# Patient Record
Sex: FEMALE | Race: WHITE | URBAN - METROPOLITAN AREA
[De-identification: names, ages, dates, MRNs, and addresses within clinical notes are randomized per-mention and may not be internally consistent; named-entity substitution may affect disease eponyms.]

---

## 2017-07-07 ENCOUNTER — OFFICE VISIT - HEALTHEAST (OUTPATIENT)
Dept: INTERNAL MEDICINE | Facility: CLINIC | Age: 72
End: 2017-07-07

## 2017-07-07 DIAGNOSIS — M25.50 JOINT PAIN: ICD-10-CM

## 2017-07-07 ASSESSMENT — MIFFLIN-ST. JEOR: SCORE: 1233.44

## 2017-07-10 ENCOUNTER — COMMUNICATION - HEALTHEAST (OUTPATIENT)
Dept: SCHEDULING | Facility: CLINIC | Age: 72
End: 2017-07-10

## 2017-07-10 ENCOUNTER — COMMUNICATION - HEALTHEAST (OUTPATIENT)
Dept: INTERNAL MEDICINE | Facility: CLINIC | Age: 72
End: 2017-07-10

## 2017-07-10 ENCOUNTER — AMBULATORY - HEALTHEAST (OUTPATIENT)
Dept: INTERNAL MEDICINE | Facility: CLINIC | Age: 72
End: 2017-07-10

## 2017-07-10 DIAGNOSIS — M25.50 JOINT PAIN: ICD-10-CM

## 2017-07-11 LAB — ANA SER QL: 0.8 U

## 2017-08-08 ENCOUNTER — OFFICE VISIT - HEALTHEAST (OUTPATIENT)
Dept: RHEUMATOLOGY | Facility: CLINIC | Age: 72
End: 2017-08-08

## 2017-08-08 ENCOUNTER — RECORDS - HEALTHEAST (OUTPATIENT)
Dept: GENERAL RADIOLOGY | Age: 72
End: 2017-08-08

## 2017-08-08 DIAGNOSIS — M25.511 PAIN IN RIGHT SHOULDER: ICD-10-CM

## 2017-08-08 DIAGNOSIS — M54.50 CHRONIC BILATERAL LOW BACK PAIN WITHOUT SCIATICA: ICD-10-CM

## 2017-08-08 DIAGNOSIS — M25.512 PAIN IN LEFT SHOULDER: ICD-10-CM

## 2017-08-08 DIAGNOSIS — M54.50 LOW BACK PAIN: ICD-10-CM

## 2017-08-08 DIAGNOSIS — G89.29 CHRONIC PAIN OF BOTH SHOULDERS: ICD-10-CM

## 2017-08-08 DIAGNOSIS — G89.29 OTHER CHRONIC PAIN: ICD-10-CM

## 2017-08-08 DIAGNOSIS — M25.512 CHRONIC PAIN OF BOTH SHOULDERS: ICD-10-CM

## 2017-08-08 DIAGNOSIS — R70.0 ELEVATED SED RATE: ICD-10-CM

## 2017-08-08 DIAGNOSIS — M25.511 CHRONIC PAIN OF BOTH SHOULDERS: ICD-10-CM

## 2017-08-08 DIAGNOSIS — G89.29 CHRONIC BILATERAL LOW BACK PAIN WITHOUT SCIATICA: ICD-10-CM

## 2017-08-08 ASSESSMENT — MIFFLIN-ST. JEOR: SCORE: 1213.48

## 2017-08-09 LAB — HCV AB SERPL QL IA: NEGATIVE

## 2017-08-10 LAB
C-ANCA - HISTORICAL: NEGATIVE
P-ANCA - HISTORICAL: NEGATIVE

## 2017-09-05 ENCOUNTER — OFFICE VISIT - HEALTHEAST (OUTPATIENT)
Dept: RHEUMATOLOGY | Facility: CLINIC | Age: 72
End: 2017-09-05

## 2017-09-05 DIAGNOSIS — R70.0 ELEVATED SED RATE: ICD-10-CM

## 2017-09-05 DIAGNOSIS — M05.79 SEROPOSITIVE RHEUMATOID ARTHRITIS OF MULTIPLE SITES (H): ICD-10-CM

## 2017-09-05 DIAGNOSIS — Z79.899 HIGH RISK MEDICATION USE: ICD-10-CM

## 2017-09-05 LAB
ALT SERPL W P-5'-P-CCNC: 23 U/L (ref 0–45)
CREAT SERPL-MCNC: 3.1 MG/DL (ref 0.6–1.1)
GFR SERPL CREATININE-BSD FRML MDRD: 15 ML/MIN/1.73M2

## 2017-09-05 ASSESSMENT — MIFFLIN-ST. JEOR: SCORE: 1213.48

## 2017-10-02 ENCOUNTER — AMBULATORY - HEALTHEAST (OUTPATIENT)
Dept: LAB | Facility: CLINIC | Age: 72
End: 2017-10-02

## 2017-10-02 DIAGNOSIS — Z79.899 HIGH RISK MEDICATION USE: ICD-10-CM

## 2017-10-02 LAB
ALT SERPL W P-5'-P-CCNC: 18 U/L (ref 0–45)
CREAT SERPL-MCNC: 2.72 MG/DL (ref 0.6–1.1)
GFR SERPL CREATININE-BSD FRML MDRD: 17 ML/MIN/1.73M2

## 2017-10-03 ENCOUNTER — COMMUNICATION - HEALTHEAST (OUTPATIENT)
Dept: RHEUMATOLOGY | Facility: CLINIC | Age: 72
End: 2017-10-03

## 2017-10-03 DIAGNOSIS — M05.79 SEROPOSITIVE RHEUMATOID ARTHRITIS OF MULTIPLE SITES (H): ICD-10-CM

## 2017-11-09 ENCOUNTER — OFFICE VISIT - HEALTHEAST (OUTPATIENT)
Dept: RHEUMATOLOGY | Facility: CLINIC | Age: 72
End: 2017-11-09

## 2017-11-09 DIAGNOSIS — Z79.899 HIGH RISK MEDICATION USE: ICD-10-CM

## 2017-11-09 DIAGNOSIS — N18.30 CRF (CHRONIC RENAL FAILURE), STAGE 3 (MODERATE) (H): ICD-10-CM

## 2017-11-09 DIAGNOSIS — M05.79 SEROPOSITIVE RHEUMATOID ARTHRITIS OF MULTIPLE SITES (H): ICD-10-CM

## 2017-11-09 DIAGNOSIS — R70.0 ELEVATED SED RATE: ICD-10-CM

## 2017-11-09 LAB
ALT SERPL W P-5'-P-CCNC: 21 U/L (ref 0–45)
CREAT SERPL-MCNC: 3.07 MG/DL (ref 0.6–1.1)
GFR SERPL CREATININE-BSD FRML MDRD: 15 ML/MIN/1.73M2

## 2017-11-09 ASSESSMENT — MIFFLIN-ST. JEOR: SCORE: 1206.22

## 2017-12-03 ENCOUNTER — COMMUNICATION - HEALTHEAST (OUTPATIENT)
Dept: RHEUMATOLOGY | Facility: CLINIC | Age: 72
End: 2017-12-03

## 2017-12-03 DIAGNOSIS — M05.79 SEROPOSITIVE RHEUMATOID ARTHRITIS OF MULTIPLE SITES (H): ICD-10-CM

## 2017-12-28 ENCOUNTER — AMBULATORY - HEALTHEAST (OUTPATIENT)
Dept: LAB | Facility: CLINIC | Age: 72
End: 2017-12-28

## 2017-12-28 DIAGNOSIS — Z79.899 HIGH RISK MEDICATION USE: ICD-10-CM

## 2017-12-28 LAB
ALT SERPL W P-5'-P-CCNC: 11 U/L (ref 0–45)
CREAT SERPL-MCNC: 3.8 MG/DL (ref 0.6–1.1)
GFR SERPL CREATININE-BSD FRML MDRD: 12 ML/MIN/1.73M2

## 2018-01-09 ENCOUNTER — COMMUNICATION - HEALTHEAST (OUTPATIENT)
Dept: RHEUMATOLOGY | Facility: CLINIC | Age: 73
End: 2018-01-09

## 2018-01-09 DIAGNOSIS — M05.79 SEROPOSITIVE RHEUMATOID ARTHRITIS OF MULTIPLE SITES (H): ICD-10-CM

## 2018-01-23 ENCOUNTER — COMMUNICATION - HEALTHEAST (OUTPATIENT)
Dept: ADMINISTRATIVE | Facility: CLINIC | Age: 73
End: 2018-01-23

## 2018-01-23 ENCOUNTER — OFFICE VISIT - HEALTHEAST (OUTPATIENT)
Dept: RHEUMATOLOGY | Facility: CLINIC | Age: 73
End: 2018-01-23

## 2018-01-23 DIAGNOSIS — Z79.899 HIGH RISK MEDICATION USE: ICD-10-CM

## 2018-01-23 DIAGNOSIS — M25.562 ACUTE PAIN OF LEFT KNEE: ICD-10-CM

## 2018-01-23 DIAGNOSIS — M05.79 SEROPOSITIVE RHEUMATOID ARTHRITIS OF MULTIPLE SITES (H): ICD-10-CM

## 2018-01-23 DIAGNOSIS — N18.30 CRF (CHRONIC RENAL FAILURE), STAGE 3 (MODERATE) (H): ICD-10-CM

## 2018-01-23 ASSESSMENT — MIFFLIN-ST. JEOR: SCORE: 1183.54

## 2018-04-07 ENCOUNTER — COMMUNICATION - HEALTHEAST (OUTPATIENT)
Dept: RHEUMATOLOGY | Facility: CLINIC | Age: 73
End: 2018-04-07

## 2018-04-07 DIAGNOSIS — M05.79 SEROPOSITIVE RHEUMATOID ARTHRITIS OF MULTIPLE SITES (H): ICD-10-CM

## 2018-04-24 ENCOUNTER — AMBULATORY - HEALTHEAST (OUTPATIENT)
Dept: LAB | Facility: CLINIC | Age: 73
End: 2018-04-24

## 2018-04-24 DIAGNOSIS — Z79.899 HIGH RISK MEDICATION USE: ICD-10-CM

## 2018-04-24 LAB
ALBUMIN SERPL-MCNC: 3 G/DL (ref 3.5–5)
ALT SERPL W P-5'-P-CCNC: <9 U/L (ref 0–45)
CREAT SERPL-MCNC: 3.12 MG/DL (ref 0.6–1.1)
ERYTHROCYTE [DISTWIDTH] IN BLOOD BY AUTOMATED COUNT: 13.1 % (ref 11–14.5)
GFR SERPL CREATININE-BSD FRML MDRD: 15 ML/MIN/1.73M2
HCT VFR BLD AUTO: 30.9 % (ref 35–47)
HGB BLD-MCNC: 10.2 G/DL (ref 12–16)
MCH RBC QN AUTO: 28.6 PG (ref 27–34)
MCHC RBC AUTO-ENTMCNC: 32.9 G/DL (ref 32–36)
MCV RBC AUTO: 87 FL (ref 80–100)
PLATELET # BLD AUTO: 372 THOU/UL (ref 140–440)
PMV BLD AUTO: 8.2 FL (ref 7–10)
RBC # BLD AUTO: 3.55 MILL/UL (ref 3.8–5.4)
WBC: 10.3 THOU/UL (ref 4–11)

## 2018-04-26 ENCOUNTER — COMMUNICATION - HEALTHEAST (OUTPATIENT)
Dept: RHEUMATOLOGY | Facility: CLINIC | Age: 73
End: 2018-04-26

## 2018-04-26 DIAGNOSIS — M05.79 SEROPOSITIVE RHEUMATOID ARTHRITIS OF MULTIPLE SITES (H): ICD-10-CM

## 2018-05-01 ENCOUNTER — RECORDS - HEALTHEAST (OUTPATIENT)
Dept: ADMINISTRATIVE | Facility: OTHER | Age: 73
End: 2018-05-01

## 2018-05-02 ENCOUNTER — COMMUNICATION - HEALTHEAST (OUTPATIENT)
Dept: TELEHEALTH | Facility: CLINIC | Age: 73
End: 2018-05-02

## 2018-05-02 ENCOUNTER — HOSPITAL ENCOUNTER (OUTPATIENT)
Dept: ULTRASOUND IMAGING | Facility: CLINIC | Age: 73
Discharge: HOME | End: 2018-05-02
Attending: INTERNAL MEDICINE

## 2018-05-02 DIAGNOSIS — N18.4 CKD (CHRONIC KIDNEY DISEASE), STAGE IV (H): ICD-10-CM

## 2018-05-10 ENCOUNTER — OFFICE VISIT - HEALTHEAST (OUTPATIENT)
Dept: RHEUMATOLOGY | Facility: CLINIC | Age: 73
End: 2018-05-10

## 2018-05-10 DIAGNOSIS — G89.29 CHRONIC PAIN OF BOTH SHOULDERS: ICD-10-CM

## 2018-05-10 DIAGNOSIS — M25.512 CHRONIC PAIN OF BOTH SHOULDERS: ICD-10-CM

## 2018-05-10 DIAGNOSIS — M05.79 SEROPOSITIVE RHEUMATOID ARTHRITIS OF MULTIPLE SITES (H): ICD-10-CM

## 2018-05-10 DIAGNOSIS — M77.8 TENDONITIS OF BOTH SHOULDERS: ICD-10-CM

## 2018-05-10 DIAGNOSIS — M25.511 CHRONIC PAIN OF BOTH SHOULDERS: ICD-10-CM

## 2018-05-10 DIAGNOSIS — N18.30 CRF (CHRONIC RENAL FAILURE), STAGE 3 (MODERATE) (H): ICD-10-CM

## 2018-05-10 DIAGNOSIS — Z79.899 HIGH RISK MEDICATION USE: ICD-10-CM

## 2018-05-16 ENCOUNTER — RECORDS - HEALTHEAST (OUTPATIENT)
Dept: ADMINISTRATIVE | Facility: OTHER | Age: 73
End: 2018-05-16

## 2018-05-18 ENCOUNTER — OFFICE VISIT - HEALTHEAST (OUTPATIENT)
Dept: PHYSICAL THERAPY | Facility: REHABILITATION | Age: 73
End: 2018-05-18

## 2018-05-18 DIAGNOSIS — M25.511 CHRONIC PAIN OF BOTH SHOULDERS: ICD-10-CM

## 2018-05-18 DIAGNOSIS — M25.512 CHRONIC PAIN OF BOTH SHOULDERS: ICD-10-CM

## 2018-05-18 DIAGNOSIS — M77.8 TENDONITIS OF BOTH SHOULDERS: ICD-10-CM

## 2018-05-18 DIAGNOSIS — G89.29 CHRONIC PAIN OF BOTH SHOULDERS: ICD-10-CM

## 2018-05-18 DIAGNOSIS — M25.562 ACUTE PAIN OF LEFT KNEE: ICD-10-CM

## 2018-05-23 ENCOUNTER — OFFICE VISIT - HEALTHEAST (OUTPATIENT)
Dept: PHYSICAL THERAPY | Facility: REHABILITATION | Age: 73
End: 2018-05-23

## 2018-05-23 DIAGNOSIS — M25.562 ACUTE PAIN OF LEFT KNEE: ICD-10-CM

## 2018-05-23 DIAGNOSIS — M25.512 CHRONIC PAIN OF BOTH SHOULDERS: ICD-10-CM

## 2018-05-23 DIAGNOSIS — M25.511 CHRONIC PAIN OF BOTH SHOULDERS: ICD-10-CM

## 2018-05-23 DIAGNOSIS — M77.8 TENDONITIS OF BOTH SHOULDERS: ICD-10-CM

## 2018-05-23 DIAGNOSIS — G89.29 CHRONIC PAIN OF BOTH SHOULDERS: ICD-10-CM

## 2018-05-24 ENCOUNTER — OFFICE VISIT - HEALTHEAST (OUTPATIENT)
Dept: PHYSICAL THERAPY | Facility: REHABILITATION | Age: 73
End: 2018-05-24

## 2018-05-24 DIAGNOSIS — M25.511 CHRONIC PAIN OF BOTH SHOULDERS: ICD-10-CM

## 2018-05-24 DIAGNOSIS — G89.29 CHRONIC PAIN OF BOTH SHOULDERS: ICD-10-CM

## 2018-05-24 DIAGNOSIS — M25.512 CHRONIC PAIN OF BOTH SHOULDERS: ICD-10-CM

## 2018-05-24 DIAGNOSIS — M77.8 TENDONITIS OF BOTH SHOULDERS: ICD-10-CM

## 2018-05-24 DIAGNOSIS — M25.562 ACUTE PAIN OF LEFT KNEE: ICD-10-CM

## 2018-05-30 ENCOUNTER — OFFICE VISIT - HEALTHEAST (OUTPATIENT)
Dept: PHYSICAL THERAPY | Facility: REHABILITATION | Age: 73
End: 2018-05-30

## 2018-05-30 DIAGNOSIS — G89.29 CHRONIC PAIN OF BOTH SHOULDERS: ICD-10-CM

## 2018-05-30 DIAGNOSIS — M77.8 TENDONITIS OF BOTH SHOULDERS: ICD-10-CM

## 2018-05-30 DIAGNOSIS — M25.511 CHRONIC PAIN OF BOTH SHOULDERS: ICD-10-CM

## 2018-05-30 DIAGNOSIS — M25.562 ACUTE PAIN OF LEFT KNEE: ICD-10-CM

## 2018-05-30 DIAGNOSIS — M25.512 CHRONIC PAIN OF BOTH SHOULDERS: ICD-10-CM

## 2018-06-01 ENCOUNTER — RECORDS - HEALTHEAST (OUTPATIENT)
Dept: ADMINISTRATIVE | Facility: OTHER | Age: 73
End: 2018-06-01

## 2018-06-04 ENCOUNTER — INFUSION - HEALTHEAST (OUTPATIENT)
Dept: INFUSION THERAPY | Facility: HOSPITAL | Age: 73
End: 2018-06-04

## 2018-06-04 DIAGNOSIS — N18.9 ANEMIA OF CHRONIC RENAL FAILURE: ICD-10-CM

## 2018-06-04 DIAGNOSIS — D63.1 ANEMIA OF CHRONIC RENAL FAILURE: ICD-10-CM

## 2018-06-06 ENCOUNTER — OFFICE VISIT - HEALTHEAST (OUTPATIENT)
Dept: PHYSICAL THERAPY | Facility: REHABILITATION | Age: 73
End: 2018-06-06

## 2018-06-06 DIAGNOSIS — G89.29 CHRONIC PAIN OF BOTH SHOULDERS: ICD-10-CM

## 2018-06-06 DIAGNOSIS — M77.8 TENDONITIS OF BOTH SHOULDERS: ICD-10-CM

## 2018-06-06 DIAGNOSIS — M25.512 CHRONIC PAIN OF BOTH SHOULDERS: ICD-10-CM

## 2018-06-06 DIAGNOSIS — M25.511 CHRONIC PAIN OF BOTH SHOULDERS: ICD-10-CM

## 2018-06-06 DIAGNOSIS — M25.562 ACUTE PAIN OF LEFT KNEE: ICD-10-CM

## 2018-06-11 ENCOUNTER — INFUSION - HEALTHEAST (OUTPATIENT)
Dept: INFUSION THERAPY | Facility: HOSPITAL | Age: 73
End: 2018-06-11

## 2018-06-11 DIAGNOSIS — N18.9 ANEMIA OF CHRONIC RENAL FAILURE: ICD-10-CM

## 2018-06-11 DIAGNOSIS — D63.1 ANEMIA OF CHRONIC RENAL FAILURE: ICD-10-CM

## 2018-06-11 LAB
ERYTHROCYTE [DISTWIDTH] IN BLOOD BY AUTOMATED COUNT: 14.6 % (ref 11–14.5)
HCT VFR BLD AUTO: 31.3 % (ref 35–47)
HGB BLD-MCNC: 9.8 G/DL (ref 12–16)
MCH RBC QN AUTO: 29.5 PG (ref 27–34)
MCHC RBC AUTO-ENTMCNC: 31.3 G/DL (ref 32–36)
MCV RBC AUTO: 94 FL (ref 80–100)
PLATELET # BLD AUTO: 329 THOU/UL (ref 140–440)
PMV BLD AUTO: 10.4 FL (ref 8.5–12.5)
RBC # BLD AUTO: 3.32 MILL/UL (ref 3.8–5.4)
WBC: 12.2 THOU/UL (ref 4–11)

## 2018-06-13 ENCOUNTER — OFFICE VISIT - HEALTHEAST (OUTPATIENT)
Dept: PHYSICAL THERAPY | Facility: REHABILITATION | Age: 73
End: 2018-06-13

## 2018-06-13 DIAGNOSIS — M25.512 CHRONIC PAIN OF BOTH SHOULDERS: ICD-10-CM

## 2018-06-13 DIAGNOSIS — M25.511 CHRONIC PAIN OF BOTH SHOULDERS: ICD-10-CM

## 2018-06-13 DIAGNOSIS — M77.8 TENDONITIS OF BOTH SHOULDERS: ICD-10-CM

## 2018-06-13 DIAGNOSIS — M25.562 ACUTE PAIN OF LEFT KNEE: ICD-10-CM

## 2018-06-13 DIAGNOSIS — G89.29 CHRONIC PAIN OF BOTH SHOULDERS: ICD-10-CM

## 2018-06-24 ENCOUNTER — COMMUNICATION - HEALTHEAST (OUTPATIENT)
Dept: RHEUMATOLOGY | Facility: CLINIC | Age: 73
End: 2018-06-24

## 2018-06-24 DIAGNOSIS — M05.79 SEROPOSITIVE RHEUMATOID ARTHRITIS OF MULTIPLE SITES (H): ICD-10-CM

## 2018-07-03 ENCOUNTER — RECORDS - HEALTHEAST (OUTPATIENT)
Dept: ADMINISTRATIVE | Facility: OTHER | Age: 73
End: 2018-07-03

## 2018-07-03 ENCOUNTER — OFFICE VISIT - HEALTHEAST (OUTPATIENT)
Dept: RHEUMATOLOGY | Facility: CLINIC | Age: 73
End: 2018-07-03

## 2018-07-03 ENCOUNTER — COMMUNICATION - HEALTHEAST (OUTPATIENT)
Dept: ADMINISTRATIVE | Facility: CLINIC | Age: 73
End: 2018-07-03

## 2018-07-03 DIAGNOSIS — M05.79 SEROPOSITIVE RHEUMATOID ARTHRITIS OF MULTIPLE SITES (H): ICD-10-CM

## 2018-07-03 DIAGNOSIS — R70.0 ELEVATED SED RATE: ICD-10-CM

## 2018-07-03 DIAGNOSIS — N18.30 CRF (CHRONIC RENAL FAILURE), STAGE 3 (MODERATE) (H): ICD-10-CM

## 2018-07-03 DIAGNOSIS — R76.8 CYCLIC CITRULLINATED PEPTIDE (CCP) ANTIBODY POSITIVE: ICD-10-CM

## 2018-07-03 DIAGNOSIS — Z79.899 HIGH RISK MEDICATION USE: ICD-10-CM

## 2018-07-03 LAB
ALBUMIN SERPL-MCNC: 3.1 G/DL (ref 3.5–5)
ALT SERPL W P-5'-P-CCNC: <9 U/L (ref 0–45)
CREAT SERPL-MCNC: 3.49 MG/DL (ref 0.6–1.1)
ERYTHROCYTE [DISTWIDTH] IN BLOOD BY AUTOMATED COUNT: 13.9 % (ref 11–14.5)
GFR SERPL CREATININE-BSD FRML MDRD: 13 ML/MIN/1.73M2
HCT VFR BLD AUTO: 35 % (ref 35–47)
HGB BLD-MCNC: 11.2 G/DL (ref 12–16)
MCH RBC QN AUTO: 29.4 PG (ref 27–34)
MCHC RBC AUTO-ENTMCNC: 31.9 G/DL (ref 32–36)
MCV RBC AUTO: 92 FL (ref 80–100)
PLATELET # BLD AUTO: 309 THOU/UL (ref 140–440)
PMV BLD AUTO: 8.6 FL (ref 7–10)
RBC # BLD AUTO: 3.79 MILL/UL (ref 3.8–5.4)
WBC: 10.5 THOU/UL (ref 4–11)

## 2018-07-03 ASSESSMENT — MIFFLIN-ST. JEOR: SCORE: 1105.52

## 2018-07-06 LAB
ALBUMIN PERCENT: 52.5 % (ref 51–67)
ALBUMIN SERPL ELPH-MCNC: 3.5 G/DL (ref 3.2–4.7)
ALPHA 1 PERCENT: 4.1 % (ref 2–4)
ALPHA 2 PERCENT: 13.1 % (ref 5–13)
ALPHA1 GLOB SERPL ELPH-MCNC: 0.3 G/DL (ref 0.1–0.3)
ALPHA2 GLOB SERPL ELPH-MCNC: 0.9 G/DL (ref 0.4–0.9)
B-GLOBULIN SERPL ELPH-MCNC: 0.9 G/DL (ref 0.7–1.2)
BETA PERCENT: 13.3 % (ref 10–17)
GAMMA GLOB SERPL ELPH-MCNC: 1.1 G/DL (ref 0.6–1.4)
GAMMA GLOBULIN PERCENT: 17 % (ref 9–20)
PATH ICD:: ABNORMAL
PROT PATTERN SERPL ELPH-IMP: ABNORMAL
PROT SERPL-MCNC: 6.7 G/DL (ref 6–8)
REVIEWING PATHOLOGIST: ABNORMAL

## 2018-07-10 ENCOUNTER — COMMUNICATION - HEALTHEAST (OUTPATIENT)
Dept: RHEUMATOLOGY | Facility: CLINIC | Age: 73
End: 2018-07-10

## 2018-07-16 ENCOUNTER — OFFICE VISIT - HEALTHEAST (OUTPATIENT)
Dept: RHEUMATOLOGY | Facility: CLINIC | Age: 73
End: 2018-07-16

## 2018-07-16 DIAGNOSIS — Z79.899 HIGH RISK MEDICATION USE: ICD-10-CM

## 2018-07-16 DIAGNOSIS — N18.30 CRF (CHRONIC RENAL FAILURE), STAGE 3 (MODERATE) (H): ICD-10-CM

## 2018-07-16 DIAGNOSIS — R76.8 CYCLIC CITRULLINATED PEPTIDE (CCP) ANTIBODY POSITIVE: ICD-10-CM

## 2018-07-16 DIAGNOSIS — M05.79 SEROPOSITIVE RHEUMATOID ARTHRITIS OF MULTIPLE SITES (H): ICD-10-CM

## 2018-07-17 LAB — HBV SURFACE AG SERPL QL IA: NEGATIVE

## 2018-07-18 LAB
QTF INTERPRETATION: NORMAL
QTF MITOGEN - NIL: 0.84 IU/ML
QTF NIL: 0.12 IU/ML
QTF RESULT: NEGATIVE
QTF TB ANTIGEN - NIL: -0.05 IU/ML

## 2018-07-19 ENCOUNTER — COMMUNICATION - HEALTHEAST (OUTPATIENT)
Dept: RHEUMATOLOGY | Facility: CLINIC | Age: 73
End: 2018-07-19

## 2018-07-25 ENCOUNTER — COMMUNICATION - HEALTHEAST (OUTPATIENT)
Dept: RHEUMATOLOGY | Facility: CLINIC | Age: 73
End: 2018-07-25

## 2018-07-25 DIAGNOSIS — M05.79 SEROPOSITIVE RHEUMATOID ARTHRITIS OF MULTIPLE SITES (H): ICD-10-CM

## 2018-07-27 ENCOUNTER — COMMUNICATION - HEALTHEAST (OUTPATIENT)
Dept: RHEUMATOLOGY | Facility: CLINIC | Age: 73
End: 2018-07-27

## 2018-07-27 DIAGNOSIS — M05.79 SEROPOSITIVE RHEUMATOID ARTHRITIS OF MULTIPLE SITES (H): ICD-10-CM

## 2018-07-30 ENCOUNTER — AMBULATORY - HEALTHEAST (OUTPATIENT)
Dept: RHEUMATOLOGY | Facility: CLINIC | Age: 73
End: 2018-07-30

## 2018-07-30 DIAGNOSIS — M05.79 SEROPOSITIVE RHEUMATOID ARTHRITIS OF MULTIPLE SITES (H): ICD-10-CM

## 2018-09-06 ENCOUNTER — OFFICE VISIT - HEALTHEAST (OUTPATIENT)
Dept: RHEUMATOLOGY | Facility: CLINIC | Age: 73
End: 2018-09-06

## 2018-09-06 DIAGNOSIS — N18.4 ANEMIA OF CHRONIC RENAL FAILURE, STAGE 4 (SEVERE) (H): ICD-10-CM

## 2018-09-06 DIAGNOSIS — D63.1 ANEMIA OF CHRONIC RENAL FAILURE, STAGE 4 (SEVERE) (H): ICD-10-CM

## 2018-09-06 DIAGNOSIS — R76.8 CYCLIC CITRULLINATED PEPTIDE (CCP) ANTIBODY POSITIVE: ICD-10-CM

## 2018-09-06 DIAGNOSIS — N18.30 CRF (CHRONIC RENAL FAILURE), STAGE 3 (MODERATE) (H): ICD-10-CM

## 2018-09-06 DIAGNOSIS — M05.79 SEROPOSITIVE RHEUMATOID ARTHRITIS OF MULTIPLE SITES (H): ICD-10-CM

## 2018-09-21 ENCOUNTER — COMMUNICATION - HEALTHEAST (OUTPATIENT)
Dept: ADMINISTRATIVE | Facility: CLINIC | Age: 73
End: 2018-09-21

## 2018-10-16 ENCOUNTER — COMMUNICATION - HEALTHEAST (OUTPATIENT)
Dept: RHEUMATOLOGY | Facility: CLINIC | Age: 73
End: 2018-10-16

## 2018-12-04 ENCOUNTER — AMBULATORY - HEALTHEAST (OUTPATIENT)
Dept: LAB | Facility: CLINIC | Age: 73
End: 2018-12-04

## 2018-12-04 DIAGNOSIS — M05.79 SEROPOSITIVE RHEUMATOID ARTHRITIS OF MULTIPLE SITES (H): ICD-10-CM

## 2018-12-04 DIAGNOSIS — N18.30 CRF (CHRONIC RENAL FAILURE), STAGE 3 (MODERATE) (H): ICD-10-CM

## 2018-12-04 LAB
ALBUMIN SERPL-MCNC: 3 G/DL (ref 3.5–5)
ALT SERPL W P-5'-P-CCNC: 10 U/L (ref 0–45)
CREAT SERPL-MCNC: 4.16 MG/DL (ref 0.6–1.1)
ERYTHROCYTE [DISTWIDTH] IN BLOOD BY AUTOMATED COUNT: 11.1 % (ref 11–14.5)
GFR SERPL CREATININE-BSD FRML MDRD: 11 ML/MIN/1.73M2
HCT VFR BLD AUTO: 33.5 % (ref 35–47)
HGB BLD-MCNC: 11.4 G/DL (ref 12–16)
MCH RBC QN AUTO: 32 PG (ref 27–34)
MCHC RBC AUTO-ENTMCNC: 33.9 G/DL (ref 32–36)
MCV RBC AUTO: 94 FL (ref 80–100)
PLATELET # BLD AUTO: 302 THOU/UL (ref 140–440)
PMV BLD AUTO: 8.1 FL (ref 7–10)
RBC # BLD AUTO: 3.55 MILL/UL (ref 3.8–5.4)
WBC: 8.8 THOU/UL (ref 4–11)

## 2018-12-06 ENCOUNTER — OFFICE VISIT - HEALTHEAST (OUTPATIENT)
Dept: RHEUMATOLOGY | Facility: CLINIC | Age: 73
End: 2018-12-06

## 2018-12-06 DIAGNOSIS — N18.30 CRF (CHRONIC RENAL FAILURE), STAGE 3 (MODERATE) (H): ICD-10-CM

## 2018-12-06 DIAGNOSIS — M05.79 SEROPOSITIVE RHEUMATOID ARTHRITIS OF MULTIPLE SITES (H): ICD-10-CM

## 2018-12-06 DIAGNOSIS — M77.8 TENDONITIS OF BOTH SHOULDERS: ICD-10-CM

## 2018-12-06 DIAGNOSIS — R76.8 CYCLIC CITRULLINATED PEPTIDE (CCP) ANTIBODY POSITIVE: ICD-10-CM

## 2018-12-19 ENCOUNTER — COMMUNICATION - HEALTHEAST (OUTPATIENT)
Dept: ADMINISTRATIVE | Facility: CLINIC | Age: 73
End: 2018-12-19

## 2018-12-19 ENCOUNTER — COMMUNICATION - HEALTHEAST (OUTPATIENT)
Dept: RHEUMATOLOGY | Facility: CLINIC | Age: 73
End: 2018-12-19

## 2019-01-21 ENCOUNTER — COMMUNICATION - HEALTHEAST (OUTPATIENT)
Dept: RHEUMATOLOGY | Facility: CLINIC | Age: 74
End: 2019-01-21

## 2019-01-28 ENCOUNTER — COMMUNICATION - HEALTHEAST (OUTPATIENT)
Dept: RHEUMATOLOGY | Facility: CLINIC | Age: 74
End: 2019-01-28

## 2019-01-28 DIAGNOSIS — M05.79 SEROPOSITIVE RHEUMATOID ARTHRITIS OF MULTIPLE SITES (H): ICD-10-CM

## 2019-02-15 ASSESSMENT — MIFFLIN-ST. JEOR: SCORE: 1095.1

## 2019-02-17 ASSESSMENT — MIFFLIN-ST. JEOR: SCORE: 1059.26

## 2019-02-19 ASSESSMENT — MIFFLIN-ST. JEOR: SCORE: 1061.08

## 2019-02-20 ENCOUNTER — AMBULATORY - HEALTHEAST (OUTPATIENT)
Dept: VASCULAR SURGERY | Facility: CLINIC | Age: 74
End: 2019-02-20

## 2019-02-20 DIAGNOSIS — T82.858S STENOSIS OF OTHER VASCULAR PROSTHETIC DEVICES, IMPLANTS AND GRAFTS, SEQUELA: ICD-10-CM

## 2019-02-20 DIAGNOSIS — N18.30 CRF (CHRONIC RENAL FAILURE), STAGE 3 (MODERATE) (H): ICD-10-CM

## 2019-02-20 ASSESSMENT — MIFFLIN-ST. JEOR: SCORE: 1069.69

## 2019-02-21 ENCOUNTER — SURGERY - HEALTHEAST (OUTPATIENT)
Dept: SURGERY | Facility: CLINIC | Age: 74
End: 2019-02-21

## 2019-02-21 ENCOUNTER — ANESTHESIA - HEALTHEAST (OUTPATIENT)
Dept: SURGERY | Facility: CLINIC | Age: 74
End: 2019-02-21

## 2019-02-22 ASSESSMENT — MIFFLIN-ST. JEOR: SCORE: 1065.16

## 2019-02-25 ENCOUNTER — COMMUNICATION - HEALTHEAST (OUTPATIENT)
Dept: VASCULAR SURGERY | Facility: CLINIC | Age: 74
End: 2019-02-25

## 2019-02-27 ENCOUNTER — COMMUNICATION - HEALTHEAST (OUTPATIENT)
Dept: VASCULAR SURGERY | Facility: CLINIC | Age: 74
End: 2019-02-27

## 2019-03-01 ENCOUNTER — COMMUNICATION - HEALTHEAST (OUTPATIENT)
Dept: NEUROSURGERY | Facility: CLINIC | Age: 74
End: 2019-03-01

## 2019-03-01 DIAGNOSIS — S22.000A THORACIC COMPRESSION FRACTURE (H): ICD-10-CM

## 2019-03-12 ENCOUNTER — HOSPITAL ENCOUNTER (OUTPATIENT)
Dept: RADIOLOGY | Facility: CLINIC | Age: 74
Discharge: HOME | End: 2019-03-12
Attending: SURGERY

## 2019-03-12 ENCOUNTER — OFFICE VISIT - HEALTHEAST (OUTPATIENT)
Dept: NEUROSURGERY | Facility: CLINIC | Age: 74
End: 2019-03-12

## 2019-03-12 DIAGNOSIS — R19.7 DIARRHEA, UNSPECIFIED TYPE: ICD-10-CM

## 2019-03-12 DIAGNOSIS — H93.19 TINNITUS, UNSPECIFIED LATERALITY: ICD-10-CM

## 2019-03-12 DIAGNOSIS — S22.000D CLOSED COMPRESSION FRACTURE OF THORACIC VERTEBRA WITH ROUTINE HEALING, SUBSEQUENT ENCOUNTER: ICD-10-CM

## 2019-03-12 DIAGNOSIS — S22.000A THORACIC COMPRESSION FRACTURE (H): ICD-10-CM

## 2019-03-12 DIAGNOSIS — R42 DIZZINESS: ICD-10-CM

## 2019-03-12 DIAGNOSIS — R07.89 OTHER CHEST PAIN: ICD-10-CM

## 2019-03-12 DIAGNOSIS — R05.9 COUGHING: ICD-10-CM

## 2019-03-12 ASSESSMENT — MIFFLIN-ST. JEOR: SCORE: 1065.61

## 2019-03-19 ENCOUNTER — RECORDS - HEALTHEAST (OUTPATIENT)
Dept: VASCULAR ULTRASOUND | Facility: CLINIC | Age: 74
End: 2019-03-19

## 2019-03-19 DIAGNOSIS — N18.30 STAGE 3 CHRONIC KIDNEY DISEASE (H): ICD-10-CM

## 2019-03-19 DIAGNOSIS — T82.858S STENOSIS OF OTHER VASCULAR PROSTHETIC DEVICES, IMPLANTS AND GRAFTS, SEQUELA: ICD-10-CM

## 2019-03-20 ENCOUNTER — OFFICE VISIT - HEALTHEAST (OUTPATIENT)
Dept: VASCULAR SURGERY | Facility: CLINIC | Age: 74
End: 2019-03-20

## 2019-03-20 ENCOUNTER — AMBULATORY - HEALTHEAST (OUTPATIENT)
Dept: VASCULAR SURGERY | Facility: CLINIC | Age: 74
End: 2019-03-20

## 2019-03-20 DIAGNOSIS — T82.858S STENOSIS OF OTHER VASCULAR PROSTHETIC DEVICES, IMPLANTS AND GRAFTS, SEQUELA: ICD-10-CM

## 2019-03-28 ENCOUNTER — OFFICE VISIT - HEALTHEAST (OUTPATIENT)
Dept: INTERNAL MEDICINE | Facility: CLINIC | Age: 74
End: 2019-03-28

## 2019-03-28 DIAGNOSIS — N18.6 ESRD (END STAGE RENAL DISEASE) ON DIALYSIS (H): ICD-10-CM

## 2019-03-28 DIAGNOSIS — R19.7 DIARRHEA, UNSPECIFIED TYPE: ICD-10-CM

## 2019-03-28 DIAGNOSIS — Z99.2 ESRD (END STAGE RENAL DISEASE) ON DIALYSIS (H): ICD-10-CM

## 2019-03-28 DIAGNOSIS — Z01.818 PREOPERATIVE EXAMINATION: ICD-10-CM

## 2019-03-28 DIAGNOSIS — N25.81 SECONDARY RENAL HYPERPARATHYROIDISM (H): ICD-10-CM

## 2019-03-28 DIAGNOSIS — R76.8 CYCLIC CITRULLINATED PEPTIDE (CCP) ANTIBODY POSITIVE: ICD-10-CM

## 2019-03-28 DIAGNOSIS — M05.79 SEROPOSITIVE RHEUMATOID ARTHRITIS OF MULTIPLE SITES (H): ICD-10-CM

## 2019-03-28 DIAGNOSIS — Z12.31 VISIT FOR SCREENING MAMMOGRAM: ICD-10-CM

## 2019-03-28 DIAGNOSIS — N18.5 ANEMIA OF CHRONIC RENAL FAILURE, STAGE 5 (H): ICD-10-CM

## 2019-03-28 DIAGNOSIS — D63.1 ANEMIA OF CHRONIC RENAL FAILURE, STAGE 5 (H): ICD-10-CM

## 2019-03-28 DIAGNOSIS — Z12.11 SCREEN FOR COLON CANCER: ICD-10-CM

## 2019-03-28 LAB — TSH SERPL DL<=0.005 MIU/L-ACNC: 2.6 UIU/ML (ref 0.3–5)

## 2019-03-28 ASSESSMENT — MIFFLIN-ST. JEOR: SCORE: 1061.07

## 2019-03-29 ENCOUNTER — AMBULATORY - HEALTHEAST (OUTPATIENT)
Dept: LAB | Facility: CLINIC | Age: 74
End: 2019-03-29

## 2019-03-29 DIAGNOSIS — R19.7 DIARRHEA, UNSPECIFIED TYPE: ICD-10-CM

## 2019-03-29 LAB
C DIFF TOX B STL QL: NEGATIVE
RIBOTYPE 027/NAP1/BI: NORMAL

## 2019-03-30 LAB
SHIGA TOXIN 1: NEGATIVE
SHIGA TOXIN 2: NEGATIVE

## 2019-03-31 LAB — O+P STL MICRO: NORMAL

## 2019-04-01 LAB
BACTERIA SPEC CULT: NORMAL
TTG IGA SER-ACNC: 0.5 U/ML
TTG IGG SER-ACNC: 0.9 U/ML

## 2019-04-02 ENCOUNTER — ANESTHESIA - HEALTHEAST (OUTPATIENT)
Dept: SURGERY | Facility: CLINIC | Age: 74
End: 2019-04-02

## 2019-04-02 ENCOUNTER — SURGERY - HEALTHEAST (OUTPATIENT)
Dept: SURGERY | Facility: CLINIC | Age: 74
End: 2019-04-02

## 2019-04-02 ENCOUNTER — AMBULATORY - HEALTHEAST (OUTPATIENT)
Dept: VASCULAR SURGERY | Facility: CLINIC | Age: 74
End: 2019-04-02

## 2019-04-02 ASSESSMENT — MIFFLIN-ST. JEOR: SCORE: 1053.13

## 2019-04-03 ENCOUNTER — COMMUNICATION - HEALTHEAST (OUTPATIENT)
Dept: RHEUMATOLOGY | Facility: CLINIC | Age: 74
End: 2019-04-03

## 2019-04-03 ENCOUNTER — SURGERY - HEALTHEAST (OUTPATIENT)
Dept: VASCULAR SURGERY | Facility: CLINIC | Age: 74
End: 2019-04-03

## 2019-04-04 ENCOUNTER — ANESTHESIA - HEALTHEAST (OUTPATIENT)
Dept: SURGERY | Facility: CLINIC | Age: 74
End: 2019-04-04

## 2019-04-05 ENCOUNTER — SURGERY - HEALTHEAST (OUTPATIENT)
Dept: SURGERY | Facility: CLINIC | Age: 74
End: 2019-04-05

## 2019-04-05 ENCOUNTER — APPOINTMENT (OUTPATIENT)
Dept: SURGERY | Facility: PHYSICIAN GROUP | Age: 74
End: 2019-04-05

## 2019-04-05 ASSESSMENT — MIFFLIN-ST. JEOR: SCORE: 1039.24

## 2019-04-10 ENCOUNTER — HOSPITAL ENCOUNTER (OUTPATIENT)
Dept: MAMMOGRAPHY | Facility: CLINIC | Age: 74
Discharge: HOME | End: 2019-04-10
Attending: INTERNAL MEDICINE

## 2019-04-10 DIAGNOSIS — Z12.31 VISIT FOR SCREENING MAMMOGRAM: ICD-10-CM

## 2019-04-11 ENCOUNTER — OFFICE VISIT - HEALTHEAST (OUTPATIENT)
Dept: INTERNAL MEDICINE | Facility: CLINIC | Age: 74
End: 2019-04-11

## 2019-04-11 DIAGNOSIS — M05.79 SEROPOSITIVE RHEUMATOID ARTHRITIS OF MULTIPLE SITES (H): ICD-10-CM

## 2019-04-11 DIAGNOSIS — S22.000A COMPRESSION FRACTURE OF BODY OF THORACIC VERTEBRA (H): ICD-10-CM

## 2019-04-11 DIAGNOSIS — R19.7 DIARRHEA, UNSPECIFIED TYPE: ICD-10-CM

## 2019-04-11 DIAGNOSIS — N18.6 ESRD (END STAGE RENAL DISEASE) (H): ICD-10-CM

## 2019-04-11 ASSESSMENT — MIFFLIN-ST. JEOR: SCORE: 1065.61

## 2019-04-17 ENCOUNTER — OFFICE VISIT - HEALTHEAST (OUTPATIENT)
Dept: VASCULAR SURGERY | Facility: CLINIC | Age: 74
End: 2019-04-17

## 2019-04-17 ENCOUNTER — RECORDS - HEALTHEAST (OUTPATIENT)
Dept: VASCULAR ULTRASOUND | Facility: CLINIC | Age: 74
End: 2019-04-17

## 2019-04-17 DIAGNOSIS — T82.858S STENOSIS OF OTHER VASCULAR PROSTHETIC DEVICES, IMPLANTS AND GRAFTS, SEQUELA: ICD-10-CM

## 2019-04-19 ENCOUNTER — HOSPITAL ENCOUNTER (OUTPATIENT)
Dept: MAMMOGRAPHY | Facility: CLINIC | Age: 74
Discharge: HOME | End: 2019-04-19
Attending: INTERNAL MEDICINE

## 2019-04-19 DIAGNOSIS — N64.89 BREAST ASYMMETRY: ICD-10-CM

## 2019-04-26 ENCOUNTER — HOSPITAL ENCOUNTER (OUTPATIENT)
Dept: RADIOLOGY | Facility: CLINIC | Age: 74
Discharge: HOME | End: 2019-04-26

## 2019-04-26 ENCOUNTER — OFFICE VISIT - HEALTHEAST (OUTPATIENT)
Dept: NEUROSURGERY | Facility: CLINIC | Age: 74
End: 2019-04-26

## 2019-04-26 DIAGNOSIS — S22.000D CLOSED COMPRESSION FRACTURE OF THORACIC VERTEBRA WITH ROUTINE HEALING, SUBSEQUENT ENCOUNTER: ICD-10-CM

## 2019-05-03 ENCOUNTER — HOSPITAL ENCOUNTER (OUTPATIENT)
Dept: MAMMOGRAPHY | Facility: CLINIC | Age: 74
Discharge: HOME | End: 2019-05-03
Attending: INTERNAL MEDICINE

## 2019-05-03 ENCOUNTER — HOSPITAL ENCOUNTER (OUTPATIENT)
Dept: MAMMOGRAPHY | Facility: CLINIC | Age: 74
Discharge: HOME | End: 2019-05-03
Attending: INTERNAL MEDICINE | Admitting: RADIOLOGY

## 2019-05-03 DIAGNOSIS — N64.89 BREAST ASYMMETRY: ICD-10-CM

## 2019-05-07 ENCOUNTER — TRANSFERRED RECORDS (OUTPATIENT)
Dept: HEALTH INFORMATION MANAGEMENT | Facility: CLINIC | Age: 74
End: 2019-05-07

## 2019-05-07 ENCOUNTER — RECORDS - HEALTHEAST (OUTPATIENT)
Dept: ADMINISTRATIVE | Facility: OTHER | Age: 74
End: 2019-05-07

## 2019-05-08 ENCOUNTER — TRANSFERRED RECORDS (OUTPATIENT)
Dept: HEALTH INFORMATION MANAGEMENT | Facility: CLINIC | Age: 74
End: 2019-05-08

## 2019-05-10 ENCOUNTER — HOSPITAL ENCOUNTER (OUTPATIENT)
Dept: ULTRASOUND IMAGING | Facility: CLINIC | Age: 74
Discharge: HOME | End: 2019-05-10
Attending: INTERNAL MEDICINE

## 2019-05-10 ENCOUNTER — OFFICE VISIT - HEALTHEAST (OUTPATIENT)
Dept: INTERNAL MEDICINE | Facility: CLINIC | Age: 74
End: 2019-05-10

## 2019-05-10 DIAGNOSIS — K65.4 MESENTERIC PANNICULITIS (H): ICD-10-CM

## 2019-05-10 DIAGNOSIS — R19.7 DIARRHEA, UNSPECIFIED TYPE: ICD-10-CM

## 2019-05-10 DIAGNOSIS — R10.30 LOWER ABDOMINAL PAIN: ICD-10-CM

## 2019-05-10 DIAGNOSIS — S22.000A COMPRESSION FRACTURE OF BODY OF THORACIC VERTEBRA (H): ICD-10-CM

## 2019-05-10 DIAGNOSIS — M05.79 SEROPOSITIVE RHEUMATOID ARTHRITIS OF MULTIPLE SITES (H): ICD-10-CM

## 2019-05-10 DIAGNOSIS — Z23 IMMUNIZATION DUE: ICD-10-CM

## 2019-05-10 DIAGNOSIS — N18.6 ESRD (END STAGE RENAL DISEASE) (H): ICD-10-CM

## 2019-05-10 LAB
ALBUMIN UR-MCNC: ABNORMAL MG/DL
APPEARANCE UR: CLEAR
BACTERIA #/AREA URNS HPF: ABNORMAL HPF
BILIRUB UR QL STRIP: NEGATIVE
COLOR UR AUTO: YELLOW
GLUCOSE UR STRIP-MCNC: NEGATIVE MG/DL
HGB UR QL STRIP: ABNORMAL
KETONES UR STRIP-MCNC: NEGATIVE MG/DL
LEUKOCYTE ESTERASE UR QL STRIP: NEGATIVE
MUCOUS THREADS #/AREA URNS LPF: ABNORMAL LPF
NITRATE UR QL: NEGATIVE
PH UR STRIP: 7.5 [PH] (ref 5–8)
RBC #/AREA URNS AUTO: ABNORMAL HPF
SP GR UR STRIP: 1.01 (ref 1–1.03)
SQUAMOUS #/AREA URNS AUTO: ABNORMAL LPF
UROBILINOGEN UR STRIP-ACNC: ABNORMAL
WBC #/AREA URNS AUTO: ABNORMAL HPF

## 2019-05-10 ASSESSMENT — MIFFLIN-ST. JEOR: SCORE: 1056.54

## 2019-05-29 ENCOUNTER — RECORDS - HEALTHEAST (OUTPATIENT)
Dept: ADMINISTRATIVE | Facility: OTHER | Age: 74
End: 2019-05-29

## 2019-06-12 ENCOUNTER — OFFICE VISIT - HEALTHEAST (OUTPATIENT)
Dept: INTERNAL MEDICINE | Facility: CLINIC | Age: 74
End: 2019-06-12

## 2019-06-12 ENCOUNTER — COMMUNICATION - HEALTHEAST (OUTPATIENT)
Dept: INTERNAL MEDICINE | Facility: CLINIC | Age: 74
End: 2019-06-12

## 2019-06-12 DIAGNOSIS — N18.6 ESRD (END STAGE RENAL DISEASE) ON DIALYSIS (H): ICD-10-CM

## 2019-06-12 DIAGNOSIS — K59.1 FUNCTIONAL DIARRHEA: ICD-10-CM

## 2019-06-12 DIAGNOSIS — M05.79 SEROPOSITIVE RHEUMATOID ARTHRITIS OF MULTIPLE SITES (H): ICD-10-CM

## 2019-06-12 DIAGNOSIS — Z99.2 ESRD (END STAGE RENAL DISEASE) ON DIALYSIS (H): ICD-10-CM

## 2019-06-12 ASSESSMENT — MIFFLIN-ST. JEOR: SCORE: 1047.46

## 2019-06-18 ENCOUNTER — OFFICE VISIT - HEALTHEAST (OUTPATIENT)
Dept: RHEUMATOLOGY | Facility: CLINIC | Age: 74
End: 2019-06-18

## 2019-06-18 DIAGNOSIS — M05.79 SEROPOSITIVE RHEUMATOID ARTHRITIS OF MULTIPLE SITES (H): ICD-10-CM

## 2019-06-18 DIAGNOSIS — R76.8 CYCLIC CITRULLINATED PEPTIDE (CCP) ANTIBODY POSITIVE: ICD-10-CM

## 2019-06-18 DIAGNOSIS — Z79.899 HIGH RISK MEDICATION USE: ICD-10-CM

## 2019-06-25 ENCOUNTER — COMMUNICATION - HEALTHEAST (OUTPATIENT)
Dept: VASCULAR SURGERY | Facility: CLINIC | Age: 74
End: 2019-06-25

## 2019-07-03 ENCOUNTER — RECORDS - HEALTHEAST (OUTPATIENT)
Dept: VASCULAR ULTRASOUND | Facility: CLINIC | Age: 74
End: 2019-07-03

## 2019-07-03 ENCOUNTER — OFFICE VISIT - HEALTHEAST (OUTPATIENT)
Dept: VASCULAR SURGERY | Facility: CLINIC | Age: 74
End: 2019-07-03

## 2019-07-03 DIAGNOSIS — N18.6 ESRD (END STAGE RENAL DISEASE) (H): ICD-10-CM

## 2019-07-03 DIAGNOSIS — T82.858S STENOSIS OF OTHER VASCULAR PROSTHETIC DEVICES, IMPLANTS AND GRAFTS, SEQUELA: ICD-10-CM

## 2019-07-03 ASSESSMENT — MIFFLIN-ST. JEOR: SCORE: 1034.31

## 2019-07-08 ENCOUNTER — OFFICE VISIT - HEALTHEAST (OUTPATIENT)
Dept: INTERNAL MEDICINE | Facility: CLINIC | Age: 74
End: 2019-07-08

## 2019-07-08 ENCOUNTER — COMMUNICATION - HEALTHEAST (OUTPATIENT)
Dept: SCHEDULING | Facility: CLINIC | Age: 74
End: 2019-07-08

## 2019-07-08 DIAGNOSIS — M05.79 SEROPOSITIVE RHEUMATOID ARTHRITIS OF MULTIPLE SITES (H): ICD-10-CM

## 2019-07-08 DIAGNOSIS — N18.6 ESRD (END STAGE RENAL DISEASE) ON DIALYSIS (H): ICD-10-CM

## 2019-07-08 DIAGNOSIS — Z99.2 ESRD (END STAGE RENAL DISEASE) ON DIALYSIS (H): ICD-10-CM

## 2019-07-08 DIAGNOSIS — M80.00XS AGE-RELATED OSTEOPOROSIS WITH CURRENT PATHOLOGICAL FRACTURE, SEQUELA: ICD-10-CM

## 2019-07-08 DIAGNOSIS — S22.000A COMPRESSION FRACTURE OF BODY OF THORACIC VERTEBRA (H): ICD-10-CM

## 2019-07-08 LAB
ANION GAP SERPL CALCULATED.3IONS-SCNC: 17 MMOL/L (ref 5–18)
BUN SERPL-MCNC: 72 MG/DL (ref 8–28)
CALCIUM SERPL-MCNC: 9.4 MG/DL (ref 8.5–10.5)
CHLORIDE BLD-SCNC: 103 MMOL/L (ref 98–107)
CO2 SERPL-SCNC: 17 MMOL/L (ref 22–31)
CREAT SERPL-MCNC: 6.52 MG/DL (ref 0.6–1.1)
GFR SERPL CREATININE-BSD FRML MDRD: 6 ML/MIN/1.73M2
GLUCOSE BLD-MCNC: 70 MG/DL (ref 70–125)
PHOSPHATE SERPL-MCNC: 5.5 MG/DL (ref 2.5–4.5)
POTASSIUM BLD-SCNC: 5 MMOL/L (ref 3.5–5)
PTH-INTACT SERPL-MCNC: 374 PG/ML (ref 10–86)
SODIUM SERPL-SCNC: 137 MMOL/L (ref 136–145)
TSH SERPL DL<=0.005 MIU/L-ACNC: 3.56 UIU/ML (ref 0.3–5)

## 2019-07-08 ASSESSMENT — MIFFLIN-ST. JEOR: SCORE: 987.05

## 2019-07-09 LAB — 25(OH)D3 SERPL-MCNC: 30.7 NG/ML (ref 30–80)

## 2019-07-10 LAB
ALBUMIN PERCENT: 53.6 % (ref 51–67)
ALBUMIN SERPL ELPH-MCNC: 4.2 G/DL (ref 3.2–4.7)
ALPHA 1 PERCENT: 3 % (ref 2–4)
ALPHA 2 PERCENT: 10.2 % (ref 5–13)
ALPHA1 GLOB SERPL ELPH-MCNC: 0.2 G/DL (ref 0.1–0.3)
ALPHA2 GLOB SERPL ELPH-MCNC: 0.8 G/DL (ref 0.4–0.9)
B-GLOBULIN SERPL ELPH-MCNC: 0.9 G/DL (ref 0.7–1.2)
BETA PERCENT: 11.5 % (ref 10–17)
GAMMA GLOB SERPL ELPH-MCNC: 1.7 G/DL (ref 0.6–1.4)
GAMMA GLOBULIN PERCENT: 21.7 % (ref 9–20)
PATH ICD:: ABNORMAL
PROT PATTERN SERPL ELPH-IMP: ABNORMAL
PROT SERPL-MCNC: 7.8 G/DL (ref 6–8)
REVIEWING PATHOLOGIST: ABNORMAL

## 2019-07-11 LAB
TTG IGA SER-ACNC: 1.1 U/ML
TTG IGG SER-ACNC: 13 U/ML

## 2019-07-12 ENCOUNTER — INFUSION - HEALTHEAST (OUTPATIENT)
Dept: INFUSION THERAPY | Facility: CLINIC | Age: 74
End: 2019-07-12

## 2019-07-12 DIAGNOSIS — M05.79 SEROPOSITIVE RHEUMATOID ARTHRITIS OF MULTIPLE SITES (H): ICD-10-CM

## 2019-07-12 DIAGNOSIS — R76.8 CYCLIC CITRULLINATED PEPTIDE (CCP) ANTIBODY POSITIVE: ICD-10-CM

## 2019-07-15 ENCOUNTER — RECORDS - HEALTHEAST (OUTPATIENT)
Dept: BONE DENSITY | Facility: CLINIC | Age: 74
End: 2019-07-15

## 2019-07-15 ENCOUNTER — RECORDS - HEALTHEAST (OUTPATIENT)
Dept: ADMINISTRATIVE | Facility: OTHER | Age: 74
End: 2019-07-15

## 2019-07-15 DIAGNOSIS — M80.00XS AGE-RELATED OSTEOPOROSIS WITH CURRENT PATHOLOGICAL FRACTURE, UNSPECIFIED SITE, SEQUELA: ICD-10-CM

## 2019-07-15 LAB
GLIADIN IGA SER-ACNC: 5.8 U/ML
GLIADIN IGG SER-ACNC: 0.6 U/ML
IGA SERPL-MCNC: 327 MG/DL (ref 65–400)
TTG IGA SER-ACNC: 1.1 U/ML
TTG IGG SER-ACNC: 13 U/ML

## 2019-07-20 ENCOUNTER — AMBULATORY - HEALTHEAST (OUTPATIENT)
Dept: INTERNAL MEDICINE | Facility: CLINIC | Age: 74
End: 2019-07-20

## 2019-07-22 ENCOUNTER — COMMUNICATION - HEALTHEAST (OUTPATIENT)
Dept: INTERNAL MEDICINE | Facility: CLINIC | Age: 74
End: 2019-07-22

## 2019-07-26 ENCOUNTER — INFUSION - HEALTHEAST (OUTPATIENT)
Dept: INFUSION THERAPY | Facility: CLINIC | Age: 74
End: 2019-07-26

## 2019-07-26 DIAGNOSIS — M05.79 SEROPOSITIVE RHEUMATOID ARTHRITIS OF MULTIPLE SITES (H): ICD-10-CM

## 2019-07-26 DIAGNOSIS — R76.8 CYCLIC CITRULLINATED PEPTIDE (CCP) ANTIBODY POSITIVE: ICD-10-CM

## 2019-07-29 ENCOUNTER — COMMUNICATION - HEALTHEAST (OUTPATIENT)
Dept: RHEUMATOLOGY | Facility: CLINIC | Age: 74
End: 2019-07-29

## 2019-07-30 ENCOUNTER — OFFICE VISIT - HEALTHEAST (OUTPATIENT)
Dept: VASCULAR SURGERY | Facility: CLINIC | Age: 74
End: 2019-07-30

## 2019-07-30 ENCOUNTER — COMMUNICATION - HEALTHEAST (OUTPATIENT)
Dept: SCHEDULING | Facility: CLINIC | Age: 74
End: 2019-07-30

## 2019-07-30 ENCOUNTER — AMBULATORY - HEALTHEAST (OUTPATIENT)
Dept: LAB | Facility: CLINIC | Age: 74
End: 2019-07-30

## 2019-07-30 DIAGNOSIS — N18.6 ESRD (END STAGE RENAL DISEASE) (H): ICD-10-CM

## 2019-07-30 DIAGNOSIS — N18.30 CRF (CHRONIC RENAL FAILURE), STAGE 3 (MODERATE) (H): ICD-10-CM

## 2019-07-30 DIAGNOSIS — M05.79 SEROPOSITIVE RHEUMATOID ARTHRITIS OF MULTIPLE SITES (H): ICD-10-CM

## 2019-07-30 LAB
ALBUMIN SERPL-MCNC: 3.6 G/DL (ref 3.5–5)
ALT SERPL W P-5'-P-CCNC: 10 U/L (ref 0–45)
CREAT SERPL-MCNC: 9.14 MG/DL (ref 0.6–1.1)
ERYTHROCYTE [DISTWIDTH] IN BLOOD BY AUTOMATED COUNT: 13.7 % (ref 11–14.5)
GFR SERPL CREATININE-BSD FRML MDRD: 4 ML/MIN/1.73M2
HCT VFR BLD AUTO: 37.1 % (ref 35–47)
HGB BLD-MCNC: 12.2 G/DL (ref 12–16)
MCH RBC QN AUTO: 29.3 PG (ref 27–34)
MCHC RBC AUTO-ENTMCNC: 33 G/DL (ref 32–36)
MCV RBC AUTO: 89 FL (ref 80–100)
PLATELET # BLD AUTO: 261 THOU/UL (ref 140–440)
PMV BLD AUTO: 8.6 FL (ref 7–10)
RBC # BLD AUTO: 4.17 MILL/UL (ref 3.8–5.4)
WBC: 5.3 THOU/UL (ref 4–11)

## 2019-07-30 ASSESSMENT — MIFFLIN-ST. JEOR: SCORE: 985.24

## 2019-08-01 ENCOUNTER — OFFICE VISIT - HEALTHEAST (OUTPATIENT)
Dept: RHEUMATOLOGY | Facility: CLINIC | Age: 74
End: 2019-08-01

## 2019-08-01 DIAGNOSIS — N18.6 ESRD (END STAGE RENAL DISEASE) ON DIALYSIS (H): ICD-10-CM

## 2019-08-01 DIAGNOSIS — R76.8 CYCLIC CITRULLINATED PEPTIDE (CCP) ANTIBODY POSITIVE: ICD-10-CM

## 2019-08-01 DIAGNOSIS — M05.79 SEROPOSITIVE RHEUMATOID ARTHRITIS OF MULTIPLE SITES (H): ICD-10-CM

## 2019-08-01 DIAGNOSIS — Z99.2 ESRD (END STAGE RENAL DISEASE) ON DIALYSIS (H): ICD-10-CM

## 2019-08-02 ENCOUNTER — OFFICE VISIT - HEALTHEAST (OUTPATIENT)
Dept: INTERNAL MEDICINE | Facility: CLINIC | Age: 74
End: 2019-08-02

## 2019-08-02 DIAGNOSIS — Z99.2 ESRD (END STAGE RENAL DISEASE) ON DIALYSIS (H): ICD-10-CM

## 2019-08-02 DIAGNOSIS — M05.79 SEROPOSITIVE RHEUMATOID ARTHRITIS OF MULTIPLE SITES (H): ICD-10-CM

## 2019-08-02 DIAGNOSIS — K21.00 GASTROESOPHAGEAL REFLUX DISEASE WITH ESOPHAGITIS: ICD-10-CM

## 2019-08-02 DIAGNOSIS — R11.0 NAUSEA: ICD-10-CM

## 2019-08-02 DIAGNOSIS — N18.6 ESRD (END STAGE RENAL DISEASE) ON DIALYSIS (H): ICD-10-CM

## 2019-08-02 ASSESSMENT — MIFFLIN-ST. JEOR: SCORE: 1003.24

## 2019-08-14 ENCOUNTER — AMBULATORY - HEALTHEAST (OUTPATIENT)
Dept: LAB | Facility: CLINIC | Age: 74
End: 2019-08-14

## 2019-08-14 DIAGNOSIS — K21.00 GASTROESOPHAGEAL REFLUX DISEASE WITH ESOPHAGITIS: ICD-10-CM

## 2019-08-14 DIAGNOSIS — R11.0 NAUSEA: ICD-10-CM

## 2019-08-15 LAB
H PYLORI AG STL QL IA: NORMAL
REPORT STATUS: NORMAL
SPECIMEN DESCRIPTION: NORMAL

## 2019-08-20 ENCOUNTER — COMMUNICATION - HEALTHEAST (OUTPATIENT)
Dept: INTERNAL MEDICINE | Facility: CLINIC | Age: 74
End: 2019-08-20

## 2019-08-21 ENCOUNTER — AMBULATORY - HEALTHEAST (OUTPATIENT)
Dept: INTERNAL MEDICINE | Facility: CLINIC | Age: 74
End: 2019-08-21

## 2019-08-21 ENCOUNTER — COMMUNICATION - HEALTHEAST (OUTPATIENT)
Dept: INTERNAL MEDICINE | Facility: CLINIC | Age: 74
End: 2019-08-21

## 2019-08-21 DIAGNOSIS — R89.4 ABNORMAL CELIAC ANTIBODY PANEL: ICD-10-CM

## 2019-08-30 ENCOUNTER — COMMUNICATION - HEALTHEAST (OUTPATIENT)
Dept: VASCULAR SURGERY | Facility: CLINIC | Age: 74
End: 2019-08-30

## 2019-09-05 ENCOUNTER — OFFICE VISIT - HEALTHEAST (OUTPATIENT)
Dept: VASCULAR SURGERY | Facility: CLINIC | Age: 74
End: 2019-09-05

## 2019-09-05 ENCOUNTER — RECORDS - HEALTHEAST (OUTPATIENT)
Dept: ADMINISTRATIVE | Facility: OTHER | Age: 74
End: 2019-09-05

## 2019-09-05 DIAGNOSIS — N18.6 ESRD (END STAGE RENAL DISEASE) (H): ICD-10-CM

## 2019-09-05 DIAGNOSIS — T82.898A OTHER SPECIFIED COMPLICATION OF VASCULAR PROSTHETIC DEVICES, IMPLANTS AND GRAFTS, INITIAL ENCOUNTER (H): ICD-10-CM

## 2019-09-06 ENCOUNTER — AMBULATORY - HEALTHEAST (OUTPATIENT)
Dept: VASCULAR SURGERY | Facility: CLINIC | Age: 74
End: 2019-09-06

## 2019-09-06 ENCOUNTER — OFFICE VISIT - HEALTHEAST (OUTPATIENT)
Dept: INTERNAL MEDICINE | Facility: CLINIC | Age: 74
End: 2019-09-06

## 2019-09-06 DIAGNOSIS — N25.81 SECONDARY RENAL HYPERPARATHYROIDISM (H): ICD-10-CM

## 2019-09-06 DIAGNOSIS — N18.6 ESRD (END STAGE RENAL DISEASE) ON DIALYSIS (H): ICD-10-CM

## 2019-09-06 DIAGNOSIS — D63.1 ANEMIA OF CHRONIC RENAL FAILURE, STAGE 5 (H): ICD-10-CM

## 2019-09-06 DIAGNOSIS — Z01.818 PRE-OPERATIVE EXAMINATION: ICD-10-CM

## 2019-09-06 DIAGNOSIS — M05.79 SEROPOSITIVE RHEUMATOID ARTHRITIS OF MULTIPLE SITES (H): ICD-10-CM

## 2019-09-06 DIAGNOSIS — S22.000A COMPRESSION FRACTURE OF BODY OF THORACIC VERTEBRA (H): ICD-10-CM

## 2019-09-06 DIAGNOSIS — N18.5 ANEMIA OF CHRONIC RENAL FAILURE, STAGE 5 (H): ICD-10-CM

## 2019-09-06 DIAGNOSIS — Z99.2 ESRD (END STAGE RENAL DISEASE) ON DIALYSIS (H): ICD-10-CM

## 2019-09-06 LAB
ATRIAL RATE - MUSE: 78 BPM
DIASTOLIC BLOOD PRESSURE - MUSE: NORMAL MMHG
INTERPRETATION ECG - MUSE: NORMAL
P AXIS - MUSE: 52 DEGREES
PR INTERVAL - MUSE: 140 MS
QRS DURATION - MUSE: 86 MS
QT - MUSE: 400 MS
QTC - MUSE: 456 MS
R AXIS - MUSE: -1 DEGREES
SYSTOLIC BLOOD PRESSURE - MUSE: NORMAL MMHG
T AXIS - MUSE: 56 DEGREES
VENTRICULAR RATE- MUSE: 78 BPM

## 2019-09-06 ASSESSMENT — MIFFLIN-ST. JEOR: SCORE: 1003.24

## 2019-09-09 ENCOUNTER — SURGERY - HEALTHEAST (OUTPATIENT)
Dept: SURGERY | Facility: CLINIC | Age: 74
End: 2019-09-09

## 2019-09-09 ENCOUNTER — ANESTHESIA - HEALTHEAST (OUTPATIENT)
Dept: SURGERY | Facility: CLINIC | Age: 74
End: 2019-09-09

## 2019-09-09 ASSESSMENT — MIFFLIN-ST. JEOR
SCORE: 1062.43
SCORE: 995.31

## 2019-09-10 ASSESSMENT — MIFFLIN-ST. JEOR: SCORE: 1054.72

## 2019-09-11 ENCOUNTER — COMMUNICATION - HEALTHEAST (OUTPATIENT)
Dept: CARE COORDINATION | Facility: CLINIC | Age: 74
End: 2019-09-11

## 2019-09-11 ENCOUNTER — COMMUNICATION - HEALTHEAST (OUTPATIENT)
Dept: VASCULAR SURGERY | Facility: CLINIC | Age: 74
End: 2019-09-11

## 2019-09-17 ENCOUNTER — OFFICE VISIT - HEALTHEAST (OUTPATIENT)
Dept: INTERNAL MEDICINE | Facility: CLINIC | Age: 74
End: 2019-09-17

## 2019-09-17 DIAGNOSIS — N18.6 ESRD (END STAGE RENAL DISEASE) (H): ICD-10-CM

## 2019-09-17 DIAGNOSIS — Z99.2 HEMODIALYSIS PATIENT (H): ICD-10-CM

## 2019-09-17 DIAGNOSIS — I77.0 A-V FISTULA (H): ICD-10-CM

## 2019-09-17 DIAGNOSIS — K21.00 GASTROESOPHAGEAL REFLUX DISEASE WITH ESOPHAGITIS: ICD-10-CM

## 2019-09-17 ASSESSMENT — MIFFLIN-ST. JEOR: SCORE: 1061.07

## 2019-09-24 ENCOUNTER — RECORDS - HEALTHEAST (OUTPATIENT)
Dept: VASCULAR ULTRASOUND | Facility: CLINIC | Age: 74
End: 2019-09-24

## 2019-09-24 DIAGNOSIS — N18.6 END STAGE RENAL DISEASE (H): ICD-10-CM

## 2019-09-24 DIAGNOSIS — T82.898A OTHER SPECIFIED COMPLICATION OF VASCULAR PROSTHETIC DEVICES, IMPLANTS AND GRAFTS, INITIAL ENCOUNTER (H): ICD-10-CM

## 2019-09-26 ENCOUNTER — OFFICE VISIT - HEALTHEAST (OUTPATIENT)
Dept: VASCULAR SURGERY | Facility: CLINIC | Age: 74
End: 2019-09-26

## 2019-09-26 DIAGNOSIS — N18.6 ESRD (END STAGE RENAL DISEASE) (H): ICD-10-CM

## 2019-10-29 ENCOUNTER — AMBULATORY - HEALTHEAST (OUTPATIENT)
Dept: VASCULAR SURGERY | Facility: CLINIC | Age: 74
End: 2019-10-29

## 2019-10-29 DIAGNOSIS — T82.898A OTHER SPECIFIED COMPLICATION OF VASCULAR PROSTHETIC DEVICES, IMPLANTS AND GRAFTS, INITIAL ENCOUNTER (H): ICD-10-CM

## 2019-10-29 DIAGNOSIS — N18.6 ESRD (END STAGE RENAL DISEASE) (H): ICD-10-CM

## 2019-10-30 ENCOUNTER — RECORDS - HEALTHEAST (OUTPATIENT)
Dept: VASCULAR ULTRASOUND | Facility: CLINIC | Age: 74
End: 2019-10-30

## 2019-10-30 DIAGNOSIS — N18.6 END STAGE RENAL DISEASE (H): ICD-10-CM

## 2019-10-30 DIAGNOSIS — T82.898A OTHER SPECIFIED COMPLICATION OF VASCULAR PROSTHETIC DEVICES, IMPLANTS AND GRAFTS, INITIAL ENCOUNTER (H): ICD-10-CM

## 2019-10-31 ENCOUNTER — OFFICE VISIT - HEALTHEAST (OUTPATIENT)
Dept: VASCULAR SURGERY | Facility: CLINIC | Age: 74
End: 2019-10-31

## 2019-10-31 DIAGNOSIS — N18.6 ESRD (END STAGE RENAL DISEASE) (H): ICD-10-CM

## 2019-11-29 ENCOUNTER — AMBULATORY - HEALTHEAST (OUTPATIENT)
Dept: LAB | Facility: CLINIC | Age: 74
End: 2019-11-29

## 2019-11-29 ENCOUNTER — COMMUNICATION - HEALTHEAST (OUTPATIENT)
Dept: RHEUMATOLOGY | Facility: CLINIC | Age: 74
End: 2019-11-29

## 2019-11-29 DIAGNOSIS — N18.30 CRF (CHRONIC RENAL FAILURE), STAGE 3 (MODERATE) (H): ICD-10-CM

## 2019-11-29 DIAGNOSIS — M05.79 SEROPOSITIVE RHEUMATOID ARTHRITIS OF MULTIPLE SITES (H): ICD-10-CM

## 2019-11-29 LAB
ALBUMIN SERPL-MCNC: 3.2 G/DL (ref 3.5–5)
ALT SERPL W P-5'-P-CCNC: 14 U/L (ref 0–45)
CREAT SERPL-MCNC: 8.09 MG/DL (ref 0.6–1.1)
ERYTHROCYTE [DISTWIDTH] IN BLOOD BY AUTOMATED COUNT: 12.9 % (ref 11–14.5)
GFR SERPL CREATININE-BSD FRML MDRD: 5 ML/MIN/1.73M2
HCT VFR BLD AUTO: 37 % (ref 35–47)
HGB BLD-MCNC: 12.1 G/DL (ref 12–16)
MCH RBC QN AUTO: 31.1 PG (ref 27–34)
MCHC RBC AUTO-ENTMCNC: 32.7 G/DL (ref 32–36)
MCV RBC AUTO: 95 FL (ref 80–100)
PLATELET # BLD AUTO: 221 THOU/UL (ref 140–440)
PMV BLD AUTO: 8.8 FL (ref 7–10)
RBC # BLD AUTO: 3.89 MILL/UL (ref 3.8–5.4)
WBC: 7.6 THOU/UL (ref 4–11)

## 2019-12-03 ENCOUNTER — OFFICE VISIT - HEALTHEAST (OUTPATIENT)
Dept: RHEUMATOLOGY | Facility: CLINIC | Age: 74
End: 2019-12-03

## 2019-12-03 DIAGNOSIS — R76.8 CYCLIC CITRULLINATED PEPTIDE (CCP) ANTIBODY POSITIVE: ICD-10-CM

## 2019-12-03 DIAGNOSIS — M05.79 SEROPOSITIVE RHEUMATOID ARTHRITIS OF MULTIPLE SITES (H): ICD-10-CM

## 2019-12-03 DIAGNOSIS — Z99.2 ESRD (END STAGE RENAL DISEASE) ON DIALYSIS (H): ICD-10-CM

## 2019-12-03 DIAGNOSIS — Z99.2 HEMODIALYSIS PATIENT (H): ICD-10-CM

## 2019-12-03 DIAGNOSIS — N18.6 ESRD (END STAGE RENAL DISEASE) ON DIALYSIS (H): ICD-10-CM

## 2019-12-03 ASSESSMENT — MIFFLIN-ST. JEOR: SCORE: 1062.43

## 2019-12-17 ENCOUNTER — OFFICE VISIT - HEALTHEAST (OUTPATIENT)
Dept: INTERNAL MEDICINE | Facility: CLINIC | Age: 74
End: 2019-12-17

## 2019-12-17 DIAGNOSIS — M05.79 SEROPOSITIVE RHEUMATOID ARTHRITIS OF MULTIPLE SITES (H): ICD-10-CM

## 2019-12-17 DIAGNOSIS — Z99.2 ESRD (END STAGE RENAL DISEASE) ON DIALYSIS (H): ICD-10-CM

## 2019-12-17 DIAGNOSIS — N18.6 ESRD (END STAGE RENAL DISEASE) ON DIALYSIS (H): ICD-10-CM

## 2019-12-17 DIAGNOSIS — K21.00 GASTROESOPHAGEAL REFLUX DISEASE WITH ESOPHAGITIS: ICD-10-CM

## 2019-12-17 DIAGNOSIS — R76.8 POSITIVE AUTOANTIBODY SCREENING FOR CELIAC DISEASE: ICD-10-CM

## 2019-12-17 DIAGNOSIS — R19.7 DIARRHEA, UNSPECIFIED TYPE: ICD-10-CM

## 2019-12-17 ASSESSMENT — MIFFLIN-ST. JEOR: SCORE: 1070.14

## 2020-01-01 ENCOUNTER — COMMUNICATION - HEALTHEAST (OUTPATIENT)
Dept: FAMILY MEDICINE | Facility: CLINIC | Age: 75
End: 2020-01-01

## 2020-01-01 ENCOUNTER — COMMUNICATION - HEALTHEAST (OUTPATIENT)
Dept: NURSING | Facility: CLINIC | Age: 75
End: 2020-01-01

## 2020-01-01 ENCOUNTER — COMMUNICATION - HEALTHEAST (OUTPATIENT)
Dept: INTERNAL MEDICINE | Facility: CLINIC | Age: 75
End: 2020-01-01

## 2020-01-01 ENCOUNTER — COMMUNICATION - HEALTHEAST (OUTPATIENT)
Dept: RHEUMATOLOGY | Facility: CLINIC | Age: 75
End: 2020-01-01

## 2020-01-01 ENCOUNTER — AMBULATORY - HEALTHEAST (OUTPATIENT)
Dept: CARE COORDINATION | Facility: CLINIC | Age: 75
End: 2020-01-01

## 2020-01-01 ENCOUNTER — OFFICE VISIT - HEALTHEAST (OUTPATIENT)
Dept: RHEUMATOLOGY | Facility: CLINIC | Age: 75
End: 2020-01-01

## 2020-01-01 ENCOUNTER — AMBULATORY - HEALTHEAST (OUTPATIENT)
Dept: LAB | Facility: CLINIC | Age: 75
End: 2020-01-01

## 2020-01-01 ENCOUNTER — COMMUNICATION - HEALTHEAST (OUTPATIENT)
Dept: LAB | Facility: CLINIC | Age: 75
End: 2020-01-01

## 2020-01-01 ENCOUNTER — APPOINTMENT (OUTPATIENT)
Dept: INTERPRETER SERVICES | Facility: CLINIC | Age: 75
End: 2020-01-01
Payer: COMMERCIAL

## 2020-01-01 DIAGNOSIS — Z79.899 HIGH RISK MEDICATION USE: ICD-10-CM

## 2020-01-01 DIAGNOSIS — M05.79 SEROPOSITIVE RHEUMATOID ARTHRITIS OF MULTIPLE SITES (H): ICD-10-CM

## 2020-01-01 DIAGNOSIS — Z99.2 ESRD (END STAGE RENAL DISEASE) ON DIALYSIS (H): ICD-10-CM

## 2020-01-01 DIAGNOSIS — R76.8 CYCLIC CITRULLINATED PEPTIDE (CCP) ANTIBODY POSITIVE: ICD-10-CM

## 2020-01-01 DIAGNOSIS — N18.6 ESRD (END STAGE RENAL DISEASE) ON DIALYSIS (H): ICD-10-CM

## 2020-01-01 LAB
ALBUMIN SERPL-MCNC: 3.2 G/DL (ref 3.5–5)
ALT SERPL W P-5'-P-CCNC: 10 U/L (ref 0–45)
CREAT SERPL-MCNC: 4.85 MG/DL (ref 0.6–1.1)
ERYTHROCYTE [DISTWIDTH] IN BLOOD BY AUTOMATED COUNT: 12.5 % (ref 11–14.5)
GFR SERPL CREATININE-BSD FRML MDRD: 9 ML/MIN/1.73M2
HCT VFR BLD AUTO: 33.5 % (ref 35–47)
HGB BLD-MCNC: 11.1 G/DL (ref 12–16)
MCH RBC QN AUTO: 32.1 PG (ref 27–34)
MCHC RBC AUTO-ENTMCNC: 33.2 G/DL (ref 32–36)
MCV RBC AUTO: 97 FL (ref 80–100)
PLATELET # BLD AUTO: 340 THOU/UL (ref 140–440)
PMV BLD AUTO: 8.1 FL (ref 7–10)
RBC # BLD AUTO: 3.47 MILL/UL (ref 3.8–5.4)
WBC: 10.2 THOU/UL (ref 4–11)

## 2020-02-04 ENCOUNTER — OFFICE VISIT - HEALTHEAST (OUTPATIENT)
Dept: RHEUMATOLOGY | Facility: CLINIC | Age: 75
End: 2020-02-04

## 2020-02-04 DIAGNOSIS — R76.8 CYCLIC CITRULLINATED PEPTIDE (CCP) ANTIBODY POSITIVE: ICD-10-CM

## 2020-02-04 DIAGNOSIS — M05.79 SEROPOSITIVE RHEUMATOID ARTHRITIS OF MULTIPLE SITES (H): ICD-10-CM

## 2020-02-04 DIAGNOSIS — N18.6 ESRD (END STAGE RENAL DISEASE) ON DIALYSIS (H): ICD-10-CM

## 2020-02-04 DIAGNOSIS — Z79.899 HIGH RISK MEDICATION USE: ICD-10-CM

## 2020-02-04 DIAGNOSIS — S22.000A COMPRESSION FRACTURE OF BODY OF THORACIC VERTEBRA (H): ICD-10-CM

## 2020-02-04 DIAGNOSIS — Z99.2 ESRD (END STAGE RENAL DISEASE) ON DIALYSIS (H): ICD-10-CM

## 2020-02-04 LAB
IGA SERPL-MCNC: 1226 MG/DL
IGA SERPL-MCNC: 240 MG/DL (ref 65–400)
IGM SERPL-MCNC: 66 MG/DL (ref 60–280)

## 2020-02-04 ASSESSMENT — MIFFLIN-ST. JEOR: SCORE: 1079.21

## 2020-02-27 ENCOUNTER — OFFICE VISIT - HEALTHEAST (OUTPATIENT)
Dept: CARDIOLOGY | Facility: CLINIC | Age: 75
End: 2020-02-27

## 2020-02-27 DIAGNOSIS — R01.1 HEART MURMUR: ICD-10-CM

## 2020-02-27 DIAGNOSIS — R00.2 PALPITATIONS: ICD-10-CM

## 2020-02-27 ASSESSMENT — MIFFLIN-ST. JEOR: SCORE: 1071.05

## 2020-03-05 ENCOUNTER — OFFICE VISIT - HEALTHEAST (OUTPATIENT)
Dept: INTERNAL MEDICINE | Facility: CLINIC | Age: 75
End: 2020-03-05

## 2020-03-05 ENCOUNTER — RECORDS - HEALTHEAST (OUTPATIENT)
Dept: GENERAL RADIOLOGY | Facility: CLINIC | Age: 75
End: 2020-03-05

## 2020-03-05 DIAGNOSIS — M25.551 HIP PAIN, RIGHT: ICD-10-CM

## 2020-03-05 DIAGNOSIS — M05.79 SEROPOSITIVE RHEUMATOID ARTHRITIS OF MULTIPLE SITES (H): ICD-10-CM

## 2020-03-05 DIAGNOSIS — Z96.642 H/O TOTAL HIP ARTHROPLASTY, LEFT: ICD-10-CM

## 2020-03-05 DIAGNOSIS — Z99.2 ESRD (END STAGE RENAL DISEASE) ON DIALYSIS (H): ICD-10-CM

## 2020-03-05 DIAGNOSIS — M25.551 PAIN IN RIGHT HIP: ICD-10-CM

## 2020-03-05 DIAGNOSIS — N18.6 ESRD (END STAGE RENAL DISEASE) ON DIALYSIS (H): ICD-10-CM

## 2020-03-05 DIAGNOSIS — Z96.642 PRESENCE OF LEFT ARTIFICIAL HIP JOINT: ICD-10-CM

## 2020-03-05 ASSESSMENT — MIFFLIN-ST. JEOR: SCORE: 1088.29

## 2020-03-10 ENCOUNTER — HOSPITAL ENCOUNTER (OUTPATIENT)
Dept: CARDIOLOGY | Facility: CLINIC | Age: 75
Discharge: HOME | End: 2020-03-10
Attending: INTERNAL MEDICINE

## 2020-03-10 DIAGNOSIS — R01.1 HEART MURMUR: ICD-10-CM

## 2020-03-10 LAB
AORTIC ROOT: 2.7 CM
AORTIC VALVE MEAN VELOCITY: 134 CM/S
AR DECEL SLOPE: 3540 MM/S2
AR PEAK VELOCITY: 347 CM/S
ASCENDING AORTA: 3.3 CM
AV DIMENSIONLESS INDEX VTI: 0.8
AV MEAN GRADIENT: 9 MMHG
AV PEAK GRADIENT: 18 MMHG
AV REGURGITANT PEAK GRADIENT: 48.2 MMHG
AV REGURGITATION PRESSURE HALF TIME: 288 MS
AV VALVE AREA: 2 CM2
AV VELOCITY RATIO: 0.7
BSA FOR ECHO PROCEDURE: 1.67 M2
CV BLOOD PRESSURE: ABNORMAL MMHG
CV ECHO HEIGHT: 62 IN
CV ECHO WEIGHT: 140 LBS
DOP CALC AO PEAK VEL: 212 CM/S
DOP CALC AO VTI: 45.6 CM
DOP CALC LVOT AREA: 2.54 CM2
DOP CALC LVOT DIAMETER: 1.8 CM
DOP CALC LVOT PEAK VEL: 154 CM/S
DOP CALC LVOT STROKE VOLUME: 89.5 CM3
DOP CALC MV VTI: 48.4 CM
DOP CALCLVOT PEAK VEL VTI: 35.2 CM
EJECTION FRACTION: 71 % (ref 55–75)
FRACTIONAL SHORTENING: 44.2 % (ref 28–44)
INTERVENTRICULAR SEPTUM IN END DIASTOLE: 0.7 CM (ref 0.6–0.9)
IVS/PW RATIO: 0.6
LA AREA 1: 26.3 CM2
LA AREA 2: 24.9 CM2
LEFT ATRIUM LENGTH: 6.24 CM
LEFT ATRIUM SIZE: 4.2 CM
LEFT ATRIUM TO AORTIC ROOT RATIO: 1.56 NO UNITS
LEFT ATRIUM VOLUME INDEX: 53.4 ML/M2
LEFT ATRIUM VOLUME: 89.2 ML
LEFT VENTRICLE CARDIAC INDEX: 3 L/MIN/M2
LEFT VENTRICLE CARDIAC OUTPUT: 5 L/MIN
LEFT VENTRICLE DIASTOLIC VOLUME INDEX: 57.5 CM3/M2 (ref 29–61)
LEFT VENTRICLE DIASTOLIC VOLUME: 96 CM3 (ref 46–106)
LEFT VENTRICLE HEART RATE: 56 BPM
LEFT VENTRICLE MASS INDEX: 101.2 G/M2
LEFT VENTRICLE SYSTOLIC VOLUME INDEX: 16.8 CM3/M2 (ref 8–24)
LEFT VENTRICLE SYSTOLIC VOLUME: 28 CM3 (ref 14–42)
LEFT VENTRICULAR INTERNAL DIMENSION IN DIASTOLE: 5.2 CM (ref 3.8–5.2)
LEFT VENTRICULAR INTERNAL DIMENSION IN SYSTOLE: 2.9 CM (ref 2.2–3.5)
LEFT VENTRICULAR MASS: 169 G
LEFT VENTRICULAR OUTFLOW TRACT MEAN GRADIENT: 5 MMHG
LEFT VENTRICULAR OUTFLOW TRACT MEAN VELOCITY: 103 CM/S
LEFT VENTRICULAR OUTFLOW TRACT PEAK GRADIENT: 9 MMHG
LEFT VENTRICULAR POSTERIOR WALL IN END DIASTOLE: 1.1 CM (ref 0.6–0.9)
LV STROKE VOLUME INDEX: 53.6 ML/M2
MITRAL VALVE E/A RATIO: 1
MITRAL VALVE MEAN INFLOW VELOCITY: 93 CM/S
MITRAL VALVE PEAK VELOCITY: 145 CM/S
MV AREA VTI: 1.85 CM2
MV AVERAGE E/E' RATIO: 18.2 CM/S
MV DECELERATION TIME: 218 MS
MV E'TISSUE VEL-LAT: 7.51 CM/S
MV E'TISSUE VEL-MED: 6.43 CM/S
MV LATERAL E/E' RATIO: 16.9
MV MEAN GRADIENT: 4 MMHG
MV MEDIAL E/E' RATIO: 19.8
MV PEAK A VELOCITY: 126 CM/S
MV PEAK E VELOCITY: 127 CM/S
MV PEAK GRADIENT: 8.4 MMHG
MV VALVE AREA BY CONTINUITY EQUATION: 1.8 CM2
NUC REST DIASTOLIC VOLUME INDEX: 2240 LBS
NUC REST SYSTOLIC VOLUME INDEX: 62 IN
TRICUSPID REGURGITATION PEAK PRESSURE GRADIENT: 32.3 MMHG
TRICUSPID VALVE ANULAR PLANE SYSTOLIC EXCURSION: 1.9 CM
TRICUSPID VALVE PEAK REGURGITANT VELOCITY: 284 CM/S

## 2020-03-10 ASSESSMENT — MIFFLIN-ST. JEOR: SCORE: 1088.29

## 2020-03-12 ENCOUNTER — OFFICE VISIT - HEALTHEAST (OUTPATIENT)
Dept: PHYSICAL THERAPY | Facility: REHABILITATION | Age: 75
End: 2020-03-12

## 2020-03-12 DIAGNOSIS — R26.81 UNSTEADINESS ON FEET: ICD-10-CM

## 2020-03-12 DIAGNOSIS — M25.551 ACUTE RIGHT HIP PAIN: ICD-10-CM

## 2020-03-12 DIAGNOSIS — M62.81 GENERALIZED MUSCLE WEAKNESS: ICD-10-CM

## 2020-03-12 DIAGNOSIS — M25.651 HIP STIFFNESS, RIGHT: ICD-10-CM

## 2020-03-13 ENCOUNTER — AMBULATORY - HEALTHEAST (OUTPATIENT)
Dept: INTERNAL MEDICINE | Facility: CLINIC | Age: 75
End: 2020-03-13

## 2020-03-13 DIAGNOSIS — M79.642 PAIN OF LEFT HAND: ICD-10-CM

## 2020-03-13 DIAGNOSIS — M25.529 ELBOW PAIN: ICD-10-CM

## 2020-03-19 ENCOUNTER — AMBULATORY - HEALTHEAST (OUTPATIENT)
Dept: CARDIOLOGY | Facility: CLINIC | Age: 75
End: 2020-03-19

## 2020-04-08 ENCOUNTER — COMMUNICATION - HEALTHEAST (OUTPATIENT)
Dept: CARDIOLOGY | Facility: CLINIC | Age: 75
End: 2020-04-08

## 2020-04-09 ENCOUNTER — COMMUNICATION - HEALTHEAST (OUTPATIENT)
Dept: PHYSICAL THERAPY | Facility: REHABILITATION | Age: 75
End: 2020-04-09

## 2020-04-10 ENCOUNTER — COMMUNICATION - HEALTHEAST (OUTPATIENT)
Dept: ADMINISTRATIVE | Facility: CLINIC | Age: 75
End: 2020-04-10

## 2020-04-23 ENCOUNTER — OFFICE VISIT - HEALTHEAST (OUTPATIENT)
Dept: RHEUMATOLOGY | Facility: CLINIC | Age: 75
End: 2020-04-23

## 2020-04-23 DIAGNOSIS — Z99.2 ESRD (END STAGE RENAL DISEASE) ON DIALYSIS (H): ICD-10-CM

## 2020-04-23 DIAGNOSIS — N18.6 ESRD (END STAGE RENAL DISEASE) ON DIALYSIS (H): ICD-10-CM

## 2020-04-23 DIAGNOSIS — M05.79 SEROPOSITIVE RHEUMATOID ARTHRITIS OF MULTIPLE SITES (H): ICD-10-CM

## 2020-04-23 DIAGNOSIS — R76.8 CYCLIC CITRULLINATED PEPTIDE (CCP) ANTIBODY POSITIVE: ICD-10-CM

## 2021-01-01 ENCOUNTER — OFFICE VISIT - HEALTHEAST (OUTPATIENT)
Dept: INTERNAL MEDICINE | Facility: CLINIC | Age: 76
End: 2021-01-01

## 2021-01-01 ENCOUNTER — RECORDS - HEALTHEAST (OUTPATIENT)
Dept: ADMINISTRATIVE | Facility: OTHER | Age: 76
End: 2021-01-01

## 2021-01-01 ENCOUNTER — AMBULATORY - HEALTHEAST (OUTPATIENT)
Dept: RHEUMATOLOGY | Facility: CLINIC | Age: 76
End: 2021-01-01

## 2021-01-01 ENCOUNTER — SURGERY - HEALTHEAST (OUTPATIENT)
Dept: SURGERY | Facility: CLINIC | Age: 76
End: 2021-01-01

## 2021-01-01 ENCOUNTER — TRANSFERRED RECORDS (OUTPATIENT)
Dept: HEALTH INFORMATION MANAGEMENT | Facility: CLINIC | Age: 76
End: 2021-01-01

## 2021-01-01 ENCOUNTER — AMBULATORY - HEALTHEAST (OUTPATIENT)
Dept: LAB | Facility: CLINIC | Age: 76
End: 2021-01-01

## 2021-01-01 ENCOUNTER — REFERRAL (OUTPATIENT)
Dept: TRANSPLANT | Facility: CLINIC | Age: 76
End: 2021-01-01

## 2021-01-01 ENCOUNTER — HEALTH MAINTENANCE LETTER (OUTPATIENT)
Age: 76
End: 2021-01-01

## 2021-01-01 ENCOUNTER — COMMUNICATION - HEALTHEAST (OUTPATIENT)
Dept: RHEUMATOLOGY | Facility: CLINIC | Age: 76
End: 2021-01-01

## 2021-01-01 ENCOUNTER — APPOINTMENT (OUTPATIENT)
Dept: INTERPRETER SERVICES | Facility: CLINIC | Age: 76
End: 2021-01-01
Payer: COMMERCIAL

## 2021-01-01 ENCOUNTER — MEDICAL CORRESPONDENCE (OUTPATIENT)
Dept: HEALTH INFORMATION MANAGEMENT | Facility: CLINIC | Age: 76
End: 2021-01-01

## 2021-01-01 ENCOUNTER — COMMUNICATION - HEALTHEAST (OUTPATIENT)
Dept: SCHEDULING | Facility: CLINIC | Age: 76
End: 2021-01-01

## 2021-01-01 ENCOUNTER — AMBULATORY - HEALTHEAST (OUTPATIENT)
Dept: CARE COORDINATION | Facility: CLINIC | Age: 76
End: 2021-01-01

## 2021-01-01 ENCOUNTER — AMBULATORY - HEALTHEAST (OUTPATIENT)
Dept: PHARMACY | Facility: HOSPITAL | Age: 76
End: 2021-01-01

## 2021-01-01 ENCOUNTER — HOSPITAL ENCOUNTER (OUTPATIENT)
Dept: NUCLEAR MEDICINE | Facility: CLINIC | Age: 76
Discharge: HOME | End: 2021-03-29
Attending: INTERNAL MEDICINE

## 2021-01-01 ENCOUNTER — COMMUNICATION - HEALTHEAST (OUTPATIENT)
Dept: NURSING | Facility: CLINIC | Age: 76
End: 2021-01-01

## 2021-01-01 ENCOUNTER — HOSPITAL ENCOUNTER (OUTPATIENT)
Dept: CARDIOLOGY | Facility: CLINIC | Age: 76
Discharge: HOME | End: 2021-03-29
Attending: INTERNAL MEDICINE

## 2021-01-01 ENCOUNTER — COMMUNICATION - HEALTHEAST (OUTPATIENT)
Dept: INTERNAL MEDICINE | Facility: CLINIC | Age: 76
End: 2021-01-01

## 2021-01-01 ENCOUNTER — OFFICE VISIT - HEALTHEAST (OUTPATIENT)
Dept: RHEUMATOLOGY | Facility: CLINIC | Age: 76
End: 2021-01-01

## 2021-01-01 ENCOUNTER — RECORDS - HEALTHEAST (OUTPATIENT)
Dept: SCHEDULING | Facility: CLINIC | Age: 76
End: 2021-01-01

## 2021-01-01 ENCOUNTER — ANESTHESIA - HEALTHEAST (OUTPATIENT)
Dept: SURGERY | Facility: CLINIC | Age: 76
End: 2021-01-01

## 2021-01-01 ENCOUNTER — HOSPITAL ENCOUNTER (OUTPATIENT)
Dept: CT IMAGING | Facility: CLINIC | Age: 76
Discharge: HOME | End: 2021-01-26
Attending: INTERNAL MEDICINE

## 2021-01-01 ENCOUNTER — AMBULATORY - HEALTHEAST (OUTPATIENT)
Dept: SCHEDULING | Facility: CLINIC | Age: 76
End: 2021-01-01

## 2021-01-01 VITALS — WEIGHT: 144 LBS | HEIGHT: 60 IN | BODY MASS INDEX: 28.27 KG/M2

## 2021-01-01 DIAGNOSIS — M05.79 SEROPOSITIVE RHEUMATOID ARTHRITIS OF MULTIPLE SITES (H): ICD-10-CM

## 2021-01-01 DIAGNOSIS — I25.10 CARDIOVASCULAR DISEASE: ICD-10-CM

## 2021-01-01 DIAGNOSIS — M19.222: ICD-10-CM

## 2021-01-01 DIAGNOSIS — N18.5 ANEMIA OF CHRONIC RENAL FAILURE, STAGE 5 (H): ICD-10-CM

## 2021-01-01 DIAGNOSIS — R14.0 ABDOMINAL DISTENTION: ICD-10-CM

## 2021-01-01 DIAGNOSIS — R10.84 ABDOMINAL PAIN, GENERALIZED: ICD-10-CM

## 2021-01-01 DIAGNOSIS — N18.6 ESRD (END STAGE RENAL DISEASE) ON DIALYSIS (H): ICD-10-CM

## 2021-01-01 DIAGNOSIS — N18.6 ESRD (END STAGE RENAL DISEASE) (H): Primary | ICD-10-CM

## 2021-01-01 DIAGNOSIS — M19.221: ICD-10-CM

## 2021-01-01 DIAGNOSIS — Z99.2 ESRD (END STAGE RENAL DISEASE) ON DIALYSIS (H): ICD-10-CM

## 2021-01-01 DIAGNOSIS — Z12.31 VISIT FOR SCREENING MAMMOGRAM: ICD-10-CM

## 2021-01-01 DIAGNOSIS — Z11.59 ENCOUNTER FOR SCREENING FOR OTHER VIRAL DISEASES: ICD-10-CM

## 2021-01-01 DIAGNOSIS — Z01.818 PRE-TRANSPLANT EVALUATION FOR KIDNEY TRANSPLANT: ICD-10-CM

## 2021-01-01 DIAGNOSIS — K65.9 PERITONITIS (H): ICD-10-CM

## 2021-01-01 DIAGNOSIS — T82.898A OTHER SPECIFIED COMPLICATION OF VASCULAR PROSTHETIC DEVICES, IMPLANTS AND GRAFTS, INITIAL ENCOUNTER (H): ICD-10-CM

## 2021-01-01 DIAGNOSIS — R07.9 ACUTE CHEST PAIN: ICD-10-CM

## 2021-01-01 DIAGNOSIS — Z01.818 PREOPERATIVE EXAMINATION: ICD-10-CM

## 2021-01-01 DIAGNOSIS — I15.8 OTHER SECONDARY HYPERTENSION: ICD-10-CM

## 2021-01-01 DIAGNOSIS — R76.8 CYCLIC CITRULLINATED PEPTIDE (CCP) ANTIBODY POSITIVE: ICD-10-CM

## 2021-01-01 DIAGNOSIS — D63.1 ANEMIA OF CHRONIC RENAL FAILURE, STAGE 5 (H): ICD-10-CM

## 2021-01-01 DIAGNOSIS — I10 ESSENTIAL HYPERTENSION: ICD-10-CM

## 2021-01-01 DIAGNOSIS — N25.81 SECONDARY RENAL HYPERPARATHYROIDISM (H): ICD-10-CM

## 2021-01-01 DIAGNOSIS — Z76.82 ORGAN TRANSPLANT CANDIDATE: ICD-10-CM

## 2021-01-01 DIAGNOSIS — M54.9 BACK PAIN: ICD-10-CM

## 2021-01-01 DIAGNOSIS — R10.13 ABDOMINAL PAIN, EPIGASTRIC: ICD-10-CM

## 2021-01-01 DIAGNOSIS — N18.6 END STAGE RENAL DISEASE (H): ICD-10-CM

## 2021-01-01 DIAGNOSIS — R10.31 RLQ ABDOMINAL PAIN: ICD-10-CM

## 2021-01-01 DIAGNOSIS — Z01.818 PRE-OPERATIVE EXAMINATION: ICD-10-CM

## 2021-01-01 DIAGNOSIS — E44.1 MILD PROTEIN-CALORIE MALNUTRITION (H): ICD-10-CM

## 2021-01-01 LAB
ALBUMIN SERPL-MCNC: 3.3 G/DL (ref 3.5–5)
ANION GAP SERPL CALCULATED.3IONS-SCNC: 13 MMOL/L (ref 5–18)
ATRIAL RATE - MUSE: 77 BPM
BUN SERPL-MCNC: 62 MG/DL (ref 8–28)
CALCIUM SERPL-MCNC: 8.9 MG/DL (ref 8.5–10.5)
CHLORIDE BLD-SCNC: 97 MMOL/L (ref 98–107)
CO2 SERPL-SCNC: 28 MMOL/L (ref 22–31)
CREAT SERPL-MCNC: 6.5 MG/DL (ref 0.6–1.1)
CV STRESS CURRENT BP HE: NORMAL
CV STRESS CURRENT HR HE: 74
CV STRESS CURRENT HR HE: 75
CV STRESS CURRENT HR HE: 76
CV STRESS CURRENT HR HE: 77
CV STRESS CURRENT HR HE: 78
CV STRESS CURRENT HR HE: 79
CV STRESS CURRENT HR HE: 80
CV STRESS CURRENT HR HE: 81
CV STRESS CURRENT HR HE: 82
CV STRESS CURRENT HR HE: 84
CV STRESS CURRENT HR HE: 84
CV STRESS DEVIATION TIME HE: NORMAL
CV STRESS ECHO PERCENT HR HE: NORMAL
CV STRESS EXERCISE STAGE HE: NORMAL
CV STRESS FINAL RESTING BP HE: NORMAL
CV STRESS FINAL RESTING HR HE: 76
CV STRESS MAX HR HE: 84
CV STRESS MAX TREADMILL GRADE HE: 0
CV STRESS MAX TREADMILL SPEED HE: 0
CV STRESS PEAK DIA BP HE: NORMAL
CV STRESS PEAK SYS BP HE: NORMAL
CV STRESS PHASE HE: NORMAL
CV STRESS PROTOCOL HE: NORMAL
CV STRESS RESTING PT POSITION HE: NORMAL
CV STRESS ST DEVIATION AMOUNT HE: NORMAL
CV STRESS ST DEVIATION ELEVATION HE: NORMAL
CV STRESS ST EVELATION AMOUNT HE: NORMAL
CV STRESS TEST TYPE HE: NORMAL
CV STRESS TOTAL STAGE TIME MIN 1 HE: NORMAL
DIASTOLIC BLOOD PRESSURE - MUSE: NORMAL
ERYTHROCYTE [DISTWIDTH] IN BLOOD BY AUTOMATED COUNT: 15.2 % (ref 11–14.5)
GFR SERPL CREATININE-BSD FRML MDRD: 6 ML/MIN/1.73M2
GLUCOSE BLD-MCNC: 62 MG/DL (ref 70–125)
HCT VFR BLD AUTO: 35.8 % (ref 35–47)
HGB BLD-MCNC: 11.1 G/DL (ref 12–16)
INTERPRETATION ECG - MUSE: NORMAL
MCH RBC QN AUTO: 30.4 PG (ref 27–34)
MCHC RBC AUTO-ENTMCNC: 31 G/DL (ref 32–36)
MCV RBC AUTO: 98 FL (ref 80–100)
NUC STRESS EJECTION FRACTION: 70 %
P AXIS - MUSE: 60 DEGREES
PHOSPHATE SERPL-MCNC: 5.8 MG/DL (ref 2.5–4.5)
PLATELET # BLD AUTO: 303 THOU/UL (ref 140–440)
PMV BLD AUTO: 9.6 FL (ref 7–10)
POTASSIUM BLD-SCNC: 4.7 MMOL/L (ref 3.5–5)
PR INTERVAL - MUSE: 142 MS
QRS DURATION - MUSE: 106 MS
QT - MUSE: 422 MS
QTC - MUSE: 477 MS
R AXIS - MUSE: 0 DEGREES
RATE PRESSURE PRODUCT: 8064
RBC # BLD AUTO: 3.65 MILL/UL (ref 3.8–5.4)
SARS-COV-2 PCR COMMENT: NORMAL
SARS-COV-2 RNA SPEC QL NAA+PROBE: NEGATIVE
SARS-COV-2 VIRUS SPECIMEN SOURCE: NORMAL
SODIUM SERPL-SCNC: 138 MMOL/L (ref 136–145)
STRESS ECHO BASELINE DIASTOLIC HE: 65
STRESS ECHO BASELINE SYSTOLIC BP: 129
STRESS ECHO CALCULATED PERCENT HR: 58 %
STRESS ECHO LAST STRESS DIASTOLIC BP: 47
STRESS ECHO LAST STRESS HR: 81
STRESS ECHO LAST STRESS SYSTOLIC BP: 96
STRESS ECHO TARGET HR: 146
SYSTOLIC BLOOD PRESSURE - MUSE: NORMAL
T AXIS - MUSE: 55 DEGREES
VENTRICULAR RATE- MUSE: 77 BPM
WBC: 8.5 THOU/UL (ref 4–11)

## 2021-01-01 ASSESSMENT — MIFFLIN-ST. JEOR
SCORE: 1091.01
SCORE: 1115.5
SCORE: 1074.68
SCORE: 1062.49
SCORE: 1129.11
SCORE: 1074.68
SCORE: 1115.5
SCORE: 1109.15

## 2021-03-18 NOTE — Clinical Note
See smart set. On PD. Has a std for 4/27 and this is not going to work. She wants an in person visit and needs a . You can send her information electronically but she does not want virtual visits.

## 2021-03-18 NOTE — LETTER
Kevin Campbell  [address] [place] [address]                March 24, 2021                                                                                        MEDICAL RECORDS REQUEST    [facility], Kidney Pancreas Transplant Program Records Request                      Facility: [facility]/ Femi Pedraza MD    Thank you for referring your patient to the [facility], Kidney Pancreas Program, in order to process the referral we will need the following information;      1. 2728 form   2. Immunizations records      Please call our office at [phone] if you have any questions or concerns.                Please fax all paper records to [phone] within 3-5 business days.      Thank you,   The SOT Referral Intake Team     [facility]  Solid Organ Transplant Office  [address] [place] [address], [address] [address]

## 2021-03-18 NOTE — LETTER
March 25, 2021    Kevin Campbell  [address] [place] [address]    Dear Trinidad,    Thank you for your interest in the Transplant Center at [facility]. We look forward to being a part of your care team and assisting you through the transplant process.    As we discussed, your transplant coordinator is Enedelia Neely (Kidney).  You may call your coordinator at any time with questions or concerns.  Your first scheduled call will be on April 12, 2021.  If this needs to change, call [phone].    Please complete the following.    1. Fill out and return the enclosed forms    Authorization for Electronic Communication    Authorization to Discuss Protected Health Information    Authorization for Release of Protected Health Information    Authorization for Care Everywhere Release of Information    2. Sign up for:    Cover, access to your electronic medical record (see enclosed pamphlet)    [email or web address], a transplant education website    You can use these tools to learn more about your transplant, communicate with your care team, and track your medical details      Sincerely,  Solid Organ Transplant  [facility]

## 2021-03-22 NOTE — TELEPHONE ENCOUNTER
PCP: Celso Maurice MD   Referring Provider: [facility]  Referring Diagnosis: ESRD    Is patient under the age of 65? N  Is patient diabetic? N  Is patient on insulin? N  Was patient offered a pancreas transplant referral? N    Is patient in a group home/assisted living? no  Does patient have a guardian? no    Referral intake process completed.  Patient is aware that after financial approval is received, medical records will be requested.   Patient confirmed for a callback from transplant coordinator on April 12, 2021. (within 2 weeks)  Tentative evaluation date April 27, 2021. (within 4 weeks)    Confirmed coordinator will discuss evaluation process in more detail at the time of their call.   Patient is aware of the need to arrange age appropriate cancer screening, vaccinations, and dental care.  Reminded patient to complete questionnaire, complete medical records release, and review packet prior to evaluation visit .  Assessed patient for special needs (ie--wheelchair, assistance, guardian, and ):     Patient instructed to call [phone] with questions.     Patient gave verbal consent during intake call to obtain medical records and documents outside of [facility]:  yes

## 2021-04-12 NOTE — TELEPHONE ENCOUNTER
Called Trinidad with a . She had been working with [facility] and said she got a letter from them saying she was off the list because she was inquiring with us. She did not have the letter available to read to me or email. Her son spoke English and asked to speak on her behalf. The issue for them is the wait time. She is 75 and does not have a living donor. She has been on dialysis since 2/2019 and was hoping our center had a shorter wait time. We discussed the wait time in our region and it being the same at all transplant centers in our region. If they are hoping to get a shorter wait time then they would need to go outside of our region and understand if they got an organ offer from another region that means the transplant is done in that region and not here. They had questions about getting a transplant in another country and artificial kidneys which I had no information to give them. I offered to call the [facility] coordinator tomorrow to see what her status really is there and call Trinidad and her son back tomorrow. I did tell her realistically she may not get an organ offer for 3 years and she would need to remain healthy enough for a transplant in that time frame. She also asked if her wait time was longer because she was from another country or her age. I assured her on both points that is not the case.

## 2021-04-13 NOTE — TELEPHONE ENCOUNTER
Spoke with [facility] coordinator this morning. Trinidad was never listed at [facility] because she did not complete her evaluation.Her  at dialysis called [facility] and told them she was moving to [facility] so they closed her evaluation and sent her a letter. Tried to reach her with a  and was only able to leave a message and asked her to return my call.

## 2021-04-19 NOTE — TELEPHONE ENCOUNTER
Contacted patient and introduced myself as their Transplant Coordinator, also introduced the role of the Transplant Coordinator in the transplant process.  Explained the purpose of this call including reviewing next steps and answering questions.    Confirmed Referring Provider, Dialysis Center, and Primary Care Physician. Notified patient of the importance of continued communication with referring providers and primary care physicians.    Reviewed components of transplant evaluation process including necessary appointments, tests, and procedures.    Answered questions for patient regarding evaluation, provided my name and contact information and requested they call with any additional questions.    Determined that patient would like additional information regarding transplant by:     Drop Down choices: Mail, Email, MyChart, Phone Call   Encourage MyChart   Notified patients that they will hear from a Transplant  to schedule evaluation.       Reviewed medical records and interviewed the patient and her son with a  on the phone. History was not always complete. ESRD on PD since 2/15/2019She has RA with cyclic cirullinated peptide antibody positive and is treated by rheumatology. She has been on rituxan and prednisone in the past. She has had ED visits for non cardiac chest pain and denies any cardiac history. Chronic back and shoulder sujatha. HTN and GERD. Surgeries include LTHA and left ovarin cystectomy for benign disease. No blood, no smoking, no etoh and no recreational drugs. BMI 28.12. Lives with her son and he is able to assist her. Independent with her ADL's. No potential live donors. No longer gets PAP screenings, due for mammogram, requesting colonoscopy records from [facility] and dental is up to date. She started working with [facility] and only attended an informational session and no further follow through which was confirmed with [facility] coordinators. Trinidad would prefer to come here.     We  talked about the evaluation day and she had a std but would rather be seen in person. Her son plans to attend with her and he speaks English. She will watch the online MTP at 0645 in clinic. Reviewed the list of providers she will see and their roles. Reviewed the goals of an evaluation and the approval process. She is aware she will be contacted by scheduling. Provided her with my contact information.    Smart set orders placed in [employer] and routed to scheduling.

## 2021-04-19 NOTE — TELEPHONE ENCOUNTER
After calling patient back again to convince her that she needs to move her kidney eval to in person per the coord, due to her needing to watch the video for [place], she confirmed for Mon, May 24 and that she would need to bring her son Timoteo with her for all day appts.  She understood and confirmed for this date.

## 2021-05-19 ENCOUNTER — POST MORTEM DOCUMENTATION (OUTPATIENT)
Dept: TRANSPLANT | Facility: CLINIC | Age: 76
End: 2021-05-19

## 2021-05-19 NOTE — Clinical Note
Patient passed away on 5/14/2021. Please cancel PKE and any upcoming appointments. Please send a sympathy card

## 2021-05-19 NOTE — PROGRESS NOTES
Received notification on 2021 at 1015 of patient's death from Shima  at dialysis , contact information is [phone]  Place of death was reported as home.  Graft status at the time of death was reported as Unknown.  Additional information: Patient chose to enter hospice and  at home on 2021  TIS verification is: Pending  The Transplant Office has been notified that patient is . The Post Mortem Encounter has been completed. Notifications have been sent to the Care team, Donor team, Immunology lab, Transplant Financial  and Social Work.   Instructions have been sent to cancel pending appointments, discontinue pending orders, eliminate paper chart and send a sympathy card to the family.

## 2021-05-20 ENCOUNTER — DOCUMENTATION ONLY (OUTPATIENT)
Dept: TRANSPLANT | Facility: CLINIC | Age: 76
End: 2021-05-20

## 2021-05-26 VITALS
HEART RATE: 76 BPM | RESPIRATION RATE: 16 BRPM | TEMPERATURE: 98 F | SYSTOLIC BLOOD PRESSURE: 112 MMHG | DIASTOLIC BLOOD PRESSURE: 58 MMHG

## 2021-05-26 VITALS
RESPIRATION RATE: 18 BRPM | SYSTOLIC BLOOD PRESSURE: 104 MMHG | TEMPERATURE: 97.8 F | DIASTOLIC BLOOD PRESSURE: 50 MMHG | HEART RATE: 84 BPM

## 2021-05-27 NOTE — PROGRESS NOTES
Patient who had the first stage of basilic vein transposition on April 2 who presents for follow-up.  Incisions and bruising around upper arm are still sore.  Some mild degree of swelling as well.  No problems with her hand function at all.    Duplex shows patent fistula with flow volumes of over 600 which is adequate.  Diameter as well at over 8 mm is largely adequate.  Only issue on duplex is the very most proximal segment appears to have significant extrinsic compression from hematoma.    Vitals:    04/17/19 1022   BP: 120/65   Pulse: 80   Resp: 18   Temp: 97.9  F (36.6  C)     Incisions are nicely healed.    Significant bruising around the antecubital fossa and distal upper arm.    Most importantly her nephrologist note and the patient herself seem to suggest that she is not tolerating hemodialysis even with the catheter very well due to hypotension and that consideration of peritoneal dialysis is appropriate.  The patient feels like she came up on this conclusion through research on her own and does not seem to recall having met and discussed this with her nephrologist.  Clearly this is critical to clarify before moving forward with the fairly large operation of a second stage basilic transposition.    Impression and plan: Will plan to see her back in 1 month to see that the bruising around the antecubital fossa has settled down.  At that time we will repeat duplex to confirm that the fistula is still open.  Hopefully well before then we will have discussed with Dr. Esme Brock at whether we should proceed with basilic vein transposition or if she should be considered for peritoneal dialysis.  She has not had abdominal surgery in the past and so this is a very reasonable consideration.

## 2021-05-27 NOTE — PROGRESS NOTES
Preoperative Consultation   Kevin Ellis   73 y.o.  female    Date of visit: 3/28/2019  Physician: Celso Maurice MD    This is a preoperative consultation requested by Dr. Suresh in preparation for AV fistula creation on 4/2/2019 at [facility], fax [phone].       Assessment and Plan   Kevin Ellis was seen in preoperative consultation in preparation for AV fistula formation.  This is a low risk surgery and the patient has increased risk for major cardiac complications based on a history of creatinine >2mg/dl Please note she has been on very low-dose prednisone and therefore will likely not need stress dose steroids.  Although she thrombosed her previous fistula, she has no other history of thrombophilia, but this could be considered if issues persist.  Finally, potassium should be checked prior to procedure.    Unrelated to her preoperative exam, she has been having diarrhea we will evaluate this with stool studies.  She may need a colonoscopy.  She is having pain related to her compression fracture and we discussed arranging physical therapy.  I have also suggested consideration of further evaluation and treatment of osteoporosis.  Finally, she has hyperreflexia and clonus of the lower extremity suggesting cervical spinal stenosis and we discussed further evaluation of this at a follow-up visit.    1. Preoperative examination    2. ESRD (end stage renal disease) on dialysis (H)    3. Visit for screening mammogram    4. Screen for colon cancer    5. Cyclic citrullinated peptide (CCP) antibody positive    6. Secondary renal hyperparathyroidism (H)    7. Anemia of chronic renal failure, stage 5 (H)    8. Seropositive rheumatoid arthritis of multiple sites (H)    9. Diarrhea, unspecified type         Patient Profile   Social History     Social History Narrative    Lives with her sons, Timoteo and Leonel.          Past Medical History   Patient Active  Problem List   Diagnosis     Seropositive rheumatoid arthritis of multiple sites (H)     High risk medication use     Anemia of chronic renal failure     Cyclic citrullinated peptide (CCP) antibody positive     ESRD (end stage renal disease) on dialysis (H)     Secondary renal hyperparathyroidism (H)     Compression fracture of body of thoracic vertebra (H)       Past Surgical History  She has a past surgical history that includes IR Tunneled Catheter Insert (2/15/2019); AV fistula placement (Left, 2/21/2019); and Total hip arthroplasty (Left).     History of Present Illness   This 73 y.o. old woman comes in for preoperative evaluation.  She unfortunately developed end-stage renal disease fairly abruptly.  Etiology uncertain but she does have long-standing hypertension and rheumatoid arthritis.  She is also on a TNF inhibitor.  She is now on dialysis but unfortunately thrombosed her AV fistula.  She has no previous history of venous or arterial thrombosis.  She otherwise is fairly healthy.  No history of coronary heart disease or stroke.  Reports that rheumatoid arthritis is fairly quiescent.  Some pain in her low back from compression fracture.    Recent antiplatelet use: no  Personal or family history of bleeding or clotting disorders: no  Steroid use in the past year: yes 2.5 mg daily  Personal or family history of difficulty with anesthesia: no  Current cardiopulmonary symptoms: no    Review of Systems: A comprehensive review of systems was negative except as noted.     Medications and Allergies   Current Outpatient Medications   Medication Sig Dispense Refill     adalimumab (HUMIRA PEN) 40 mg/0.8 mL PnKt Inject 40 mg under the skin every 14 (fourteen) days. 1 kit 5     calcium acetate (PHOSLO) 667 mg capsule Take 2 capsules (1,334 mg total) by mouth 3 (three) times a day with meals. 180 capsule 0     predniSONE (DELTASONE) 2.5 MG tablet Take 2.5 mg by mouth daily. 30 tablet 0     No current  facility-administered medications for this visit.      Allergies   Allergen Reactions     Ibuprofen         Family and Social History   Family History   Problem Relation Age of Onset     Heart attack Mother      No Medical Problems Father      No Medical Problems Brother      No Medical Problems Sister      No Medical Problems Brother      No Medical Problems Son      No Medical Problems Daughter         Social History     Tobacco Use     Smoking status: Never Smoker     Smokeless tobacco: Never Used   Substance Use Topics     Alcohol use: No     Drug use: No        Physical Exam   General Appearance:   No acute distress    /70 (Patient Site: Left Arm, Patient Position: Sitting, Cuff Size: Adult Regular)   Pulse 88   Ht 5' (1.524 m)   Wt 141 lb (64 kg)   LMP 05/05/1985   SpO2 98%   BMI 27.54 kg/m      EYES: Eyelids, conjunctiva, and sclera were normal. Pupils were normal. Cornea, iris, and lens were normal bilaterally.  HEAD, EARS, NOSE, MOUTH, AND THROAT: Head and face were normal. Hearing was normal to voice and the ears were normal to external exam. Nose appearance was normal and there was no discharge. Oropharynx was normal.  NECK: Neck appearance was normal. There were no neck masses and the thyroid was not enlarged.  RESPIRATORY: Breathing pattern was normal and the chest moved symmetrically.  Percussion/auscultatory percussion was normal.  Lung sounds were normal and there were no abnormal sounds.  CARDIOVASCULAR: Heart rate and rhythm were normal.  S1 and S2 were normal and there were no extra sounds or murmurs. Peripheral pulses in arms and legs were normal.  Thrombosed AV fistula in the left arm, dialysis catheter in the right chest wall.  Jugular venous pressure was normal.  There was no peripheral edema.  GASTROINTESTINAL: The abdomen was normal in contour.  Bowel sounds were present.  Percussion detected no organ enlargement or tenderness.  Palpation detected no tenderness, mass, or enlarged  organs.   MUSCULOSKELETAL: Skeletal configuration was normal and muscle mass was normal for age. Joint appearance was overall normal.  LYMPHATIC: There were no enlarged nodes.  SKIN/HAIR/NAILS: Skin color was normal.  There were no skin lesions.  Hair and nails were normal.  NEUROLOGIC: The patient was alert and oriented to person, place, time, and circumstance. Speech was normal. Cranial nerves were normal. Motor strength was normal for age. The patient was normally coordinated.  Hyperreflexia bilateral upper and lower extremities with 3 beats of clonus in her ankles.  PSYCHIATRIC:  Mood and affect were normal and the patient had normal recent and remote memory. The patient's judgment and insight were normal.       Additional Tests   Laboratory: Reviewed    Radiology: Reviewed    Electrocardiogram: Sinus rhythm, personally reviewed    Total time spent with the patient today was 60 minutes of which > 50% was spent in counseling and coordination of care     Celso Maurice MD  Internal Medicine  Contact me at [phone]

## 2021-05-27 NOTE — ANESTHESIA CARE TRANSFER NOTE
Last vitals:   Vitals:    04/05/19 1546   BP: 121/60   Pulse: 70   Resp: 14   Temp: 36.4  C (97.6  F)   SpO2: 96%     Patient's level of consciousness is awake  Spontaneous respirations: yes  Maintains airway independently: yes  Dentition unchanged: yes  Oropharynx: oropharynx clear of all foreign objects    QCDR Measures:  ASA# 20 - Surgical Safety Checklist: WHO surgical safety checklist completed prior to induction    PQRS# 430 - Adult PONV Prevention: 4558F - Pt received => 2 anti-emetic agents (different classes) preop & intraop  ASA# 8 - Peds PONV Prevention: NA - Not pediatric patient, not GA or 2 or more risk factors NOT present  PQRS# 424 - Rakel-op Temp Management: NA - MAC anesthesia or case < 60 minutes  PQRS# 426 - PACU Transfer Protocol: - Transfer of care checklist used  ASA# 14 - Acute Post-op Pain: ASA14B - Patient did NOT experience pain >= 7 out of 10

## 2021-05-27 NOTE — PROGRESS NOTES
DIOGENES; JOB #[id number]    Carlos Osborne. AV fistula placed on 2/21/19. Revision on 4/2/19. Does dialysis Reije, thur, Sat.     Left arm pain. 10/10 stabbing pain.

## 2021-05-27 NOTE — ANESTHESIA POSTPROCEDURE EVALUATION
Patient: Kevin Ellis  LEFT ARM BASILIC FISTULA STAGE 1  Anesthesia type: regional    Patient location: PACU  Last vitals:   Vitals:    04/05/19 1610   BP: 138/61   Pulse: 72   Resp: 20   Temp:    SpO2: 96%     Post vital signs: stable  Level of consciousness: awake and responds to simple questions  Post-anesthesia pain: pain controlled  Post-anesthesia nausea and vomiting: no  Pulmonary: unassisted, return to baseline  Cardiovascular: stable and blood pressure at baseline  Hydration: adequate  Anesthetic events: no    QCDR Measures:  ASA# 11 - Rakel-op Cardiac Arrest: ASA11B - Patient did NOT experience unanticipated cardiac arrest  ASA# 12 - Rakel-op Mortality Rate: ASA12B - Patient did NOT die  ASA# 13 - PACU Re-Intubation Rate: NA - No ETT / LMA used for case  ASA# 10 - Composite Anes Safety: ASA10A - No serious adverse event    Additional Notes:

## 2021-05-27 NOTE — ANESTHESIA PROCEDURE NOTES
Peripheral Block    Patient location during procedure: pre-op  Start time: 4/5/2019 1:13 PM  End time: 4/5/2019 1:18 PM  post-op analgesia per surgeon order as noted in medical record  Staffing:  Performing  Anesthesiologist: Ronan Rodney MD  Performing CRNA: Jones Borjas CRNA  Preanesthetic Checklist  Completed: patient identified, site marked, risks, benefits, and alternatives discussed, timeout performed, consent obtained, at patient's request, airway assessed, oxygen available, suction available, emergency drugs available and hand hygiene performed  Peripheral Block  Block type: brachial plexus, supraclavicular  Prep: ChloraPrep  Patient position: supine  Patient monitoring: blood pressure, heart rate, continuous pulse oximetry and cardiac monitor  Laterality: left  Injection technique: ultrasound guided    Ultrasound used to visualize needle placement in proximity to nerve being blocked: yes   Permanent ultrasound image captured for medical record  Sterile gel and probe cover used for ultrasound.    Needle  Needle type: Stimuplex   Needle gauge: 21 G  Needle length: 4 in  no peripheral nerve catheter placed  Assessment  Injection assessment: no difficulty with injection, negative aspiration for heme, no paresthesia on injection and incremental injection

## 2021-05-27 NOTE — PROGRESS NOTES
Office Visit - Follow Up   Kevin Ellis   73 y.o. female    Date of Visit: 4/11/2019    Chief Complaint   Patient presents with     Abdominal Pain     lower        Assessment and Plan   1. Diarrhea, unspecified type  Etiology uncertain, recommended Imodium with each loose stool and a colonoscopy  - Ambulatory referral for Colonoscopy    2. ESRD (end stage renal disease) (H)  Dialysis.  Renal    3. Compression fracture of body of thoracic vertebra (H)  Improving, she should discuss with renal regarding treatment for osteoporosis given her end-stage renal disease    4. Seropositive rheumatoid arthritis of multiple sites (H)  Continue medication per rheumatology    Return in about 1 month (around 5/9/2019) for recheck.     History of Present Illness   This 73 y.o. old woman comes in for follow-up.  Overall she is doing okay.  She had another fistula created in her left upper arm.  Still using her dialysis catheter.  Still with diarrhea, bowel movement after every meal.  This is not new going on for years.  Reports remote history of colonoscopy which she states was unremarkable.  We did some stool studies which looked okay.  Having some pain after surgery does not like using opioids.  She wonders if there is another pain medication she could use.  Back pain improving.    Review of Systems: A comprehensive review of systems was negative except as noted.     Medications, Allergies and Problem List   Reviewed, reconciled and updated     Physical Exam   General Appearance:   No acute distress    /62 (Patient Site: Right Arm, Patient Position: Sitting, Cuff Size: Adult Regular)   Pulse 100   Ht 5' (1.524 m)   Wt 142 lb (64.4 kg)   LMP 05/05/1985   SpO2 98%   BMI 27.73 kg/m      HEENT exam is unremarkable  Neck supple no thyromegaly or nodule palpable  Cardiovascular regular rate and rhythm no murmur gallop or rub extensive bruising about left upper arm surgical site, fistula with thrill  audible  Pulmonary lungs are clear to auscultation bilaterally  Gastrointestinall abdomen soft nontender nondistended no organomegaly  Neurologic exam is non focal with the exception of hyperreflexia at her biceps tendons and a couple beats of clonus  Psychiatric pleasant, no confusion or agitation        Additional Information   Current Outpatient Medications   Medication Sig Dispense Refill     adalimumab (HUMIRA PEN) 40 mg/0.8 mL PnKt Inject 40 mg under the skin every 14 (fourteen) days. 1 kit 5     aspirin 81 MG EC tablet Take 1 tablet (81 mg total) by mouth daily.  0     calcium acetate (PHOSLO) 667 mg capsule Take 2 capsules (1,334 mg total) by mouth 3 (three) times a day with meals. 180 capsule 0     No current facility-administered medications for this visit.      Allergies   Allergen Reactions     Ibuprofen      Social History     Tobacco Use     Smoking status: Never Smoker     Smokeless tobacco: Never Used   Substance Use Topics     Alcohol use: No     Drug use: No       Review and/or order of clinical lab tests:  Review and/or order of radiology tests:  Review and/or order of medicine tests:  Discussion of test results with performing physician:  Decision to obtain old records and/or obtain history from someone other than the patient:  Review and summarization of old records and/or obtaining history from someone other than the patient and.or discussion of case with another health care provider:  Independent visualization of image, tracing or specimen itself:    Time:      Celso Maurice MD

## 2021-05-28 NOTE — PROGRESS NOTES
Office Visit - Follow Up   Kevin Ellis   73 y.o. female    Date of Visit: 5/10/2019    Chief Complaint   Patient presents with     Abdominal Pain     1 month- had colonoscopy        Assessment and Plan   1. Lower abdominal pain  Etiology uncertain.  CT scan previously showed concern for mesenteric panniculitis and I am not certain if this is an incidental finding or contributing to her pain..  Colonoscopy unremarkable.  T12 compression fracture could result in radicular lower abdominal pain but her pain preceded her compression fracture.  Will obtain transvaginal ultrasound and have her see gastroenterology.  - Urinalysis-UC if Indicated  - US Pelvis With Transvaginal Non OB; Future  - Ambulatory referral to Gastroenterology    2. Mesenteric panniculitis (H)  As above  - Ambulatory referral to Gastroenterology    3. Compression fracture of body of thoracic vertebra (H)  As above, see neurosurgery    4. ESRD (end stage renal disease) (H)  Dialysis, per nephrology, being evaluated for transplant.    5. Seropositive rheumatoid arthritis of multiple sites (H)  Sees rheumatology on [facility]    6. Immunization due  - Pneumococcal polysaccharide vaccine 23-valent 1 yo or older, subq/IM    7. Diarrhea, unspecified type  Colonoscopy unremarkable, see above  - Ambulatory referral to Gastroenterology    Return in about 1 month (around 6/7/2019) for recheck.     History of Present Illness   This 73 y.o. old woman comes in for follow-up.  She has been struggling with abdominal pain and diarrhea.  This is been going on for at least a year.  She had a CT scan on 2/17/2019 that showed a nonspecific appearance of the mesentery with diffuse stranding along the mesenteric vessels.  They were concerned about mesenteric panniculitis.  She had a colonoscopy which was reportedly normal.  Her pain is deep, constant associated with diarrhea.  She has no urinary symptoms.  She is making urine.  She does not have nausea  or vomiting.  She has had a T12 compression fracture    Review of Systems: A comprehensive review of systems was negative except as noted.     Medications, Allergies and Problem List   Reviewed, reconciled and updated     Physical Exam   General Appearance:   No acute distress    /60 (Patient Site: Right Arm, Patient Position: Sitting, Cuff Size: Adult Regular)   Pulse 82   Ht 5' (1.524 m)   Wt 140 lb (63.5 kg)   LMP 05/05/1985   SpO2 99%   BMI 27.34 kg/m      HEENT exam is unremarkable  Neck supple no thyromegaly or nodule palpable  Lymphatic no cervical lymphadenopathy  Cardiovascular regular rate and rhythm no murmur gallop or rub  Pulmonary lungs are clear to auscultation bilaterally  Gastrointestinal mild lower abdominal tenderness with palpation  Neurologic exam is non focal  Psychiatric pleasant, no confusion or agitation        Additional Information   Current Outpatient Medications   Medication Sig Dispense Refill     adalimumab (HUMIRA PEN) 40 mg/0.8 mL PnKt Inject 40 mg under the skin every 14 (fourteen) days. 1 kit 5     aspirin 81 MG EC tablet Take 1 tablet (81 mg total) by mouth daily.  0     calcium acetate (PHOSLO) 667 mg capsule Take 2 capsules (1,334 mg total) by mouth 3 (three) times a day with meals. 180 capsule 0     No current facility-administered medications for this visit.      Allergies   Allergen Reactions     Ibuprofen      Social History     Tobacco Use     Smoking status: Never Smoker     Smokeless tobacco: Never Used   Substance Use Topics     Alcohol use: No     Drug use: No       Review and/or order of clinical lab tests:  Review and/or order of radiology tests:  Review and/or order of medicine tests:  Discussion of test results with performing physician:  Decision to obtain old records and/or obtain history from someone other than the patient:  Review and summarization of old records and/or obtaining history from someone other than the patient and.or discussion of case  with another health care provider:  Independent visualization of image, tracing or specimen itself:    Time:      Celso Maurice MD

## 2021-05-28 NOTE — PROGRESS NOTES
NEUROSURGERY FOLLOW UP EVALUATION:    ASSESSMENT  Kevin Ellis is a 73 y.o. female, who presents today for follow up on T12 superior endplate compression fracture with 30 % height loss after a fall 2/22/2019. She had back pain and right buttock pain. She was treated in a TLSO. XR today shows no progression of her fracture and is unchanged from a month ago.     Examined with professional  present  Today she reports resolution of pain. She reports she has not worn her brace for a month. Denies leg pain, numbness or tingling, or change in bowel or bladder habits. She is unsure if she has had a dexascan or been told she had osteoporosis.     PLAN:   1. Recommend patient wear her brace as directed. If she does, she can return in 1 month with repeat XR. She will call us.   2. If patient decides to continue not wearing her brace, follow up as needed (back pain, leg pain, change in bowel or bladder habits  3. Osteoporosis referral placed    HPI: 73 yr old female originally seen in the hospital after a fall for T12 compression fracture with 30% height loss. Patient's medical history includes: renal failure with dialysis implementation of recent, seropositive rheumatoid arthritis of multiple sites.     Past Medical History:   Diagnosis Date     Anemia      Arthritis      Chronic kidney disease      Coronary artery disease      Cyclic citrullinated peptide (CCP) antibody positive      Dialysis patient (H)      Diarrhea      ESRD (end stage renal disease) (H)      Hypertension      Inverted nipple      Myocardial infarction (H)      Secondary renal hyperparathyroidism (H)      Seropositive rheumatoid arthritis (H)      Shortness of breath       Past Surgical History:   Procedure Laterality Date     AV FISTULA PLACEMENT Left 2/21/2019    Procedure: LEFT ARM  ARTERIOVENOUS GRAFT PLACEMENT;  Surgeon: Sheri Hooper MD;  Location: [facility];  Service: General     AV FISTULA PLACEMENT Left  4/5/2019    Procedure: LEFT ARM BASILIC FISTULA STAGE 1;  Surgeon: Sheri Hooper MD;  Location: [facility];  Service: General     IR TUNNELED CATHETER INSERT  2/15/2019     TOTAL HIP ARTHROPLASTY Left     per new patient questionaire       Current Outpatient Medications   Medication Sig     adalimumab (HUMIRA PEN) 40 mg/0.8 mL PnKt Inject 40 mg under the skin every 14 (fourteen) days.     aspirin 81 MG EC tablet Take 1 tablet (81 mg total) by mouth daily.     calcium acetate (PHOSLO) 667 mg capsule Take 2 capsules (1,334 mg total) by mouth 3 (three) times a day with meals.       PHYSICAL EXAM:  /74   Pulse 74   Resp 16   LMP 05/05/1985     Alert and oriented x3, speech normal  PERRL, EOMI, face symmetric, tongue midline, shoulder shrug equal  No pronator drift, finger to nose smooth and accurate bilaterally  Arm strength: 5/5  Leg strength: 5/5   Bulk and tone: normal for age  Reflexes: biceps, triceps, brachioradialis, patellar and achilles 2+  No pathological reflexes   Coordination/Gait steady gait. Tandem gait not tested.     IMAGING:  The imaging was personally reviewed by me.     T12 superior endplate height loss of approximately 30% does not appear significantly changed since 3/12/2019. The remaining vertebral bodies are unremarkable in height.     Minimally accentuated kyphosis at this level with otherwise unremarkable alignment.     Pedicles appear symmetric.     Intervertebral disc spaces are maintained.     Visualized lung fields are well aerated. There is a right-sided central venous catheter.    ALLY [facility]  O: [phone]

## 2021-05-29 NOTE — TELEPHONE ENCOUNTER
Dr. Maurice,    Nephrology order:    Patient was followed by Dr. Aguilar at [facility]. When patient was placed on dialysis, the patient was moved to followed by the nephrologist that rounds at the [facility] location.     Called the [facility] location at [phone]. They state that Dr. Hola Wolf from [facility] rounds once monthly at this location. There is also a NP that comes once monthly and rounds. I asked how we can have the patient rounded on more often, they stated that the patient would have to follow Dr. Wolf at his clinic.     I called [facility] to get the patient scheduled to see Dr. Wolf since she is now his patient. The office had to send him a message to see if the patient should be seen at the clinic or if being rounded on would be enough. I told the  that the patient stated she has only seen Dr. Wolf 1 to 2 times in the since Jan. 2019. She said she still needs to send a message to the provider. They will contact you back if they would like more information on the patient's care.     We are not able to move forward on an appointment until Dr. Wolf calls back.

## 2021-05-29 NOTE — TELEPHONE ENCOUNTER
Called back to [facility] to schedule patient for consult with Dr. Wolf or AMPARO biggs rounds at [facility]. Patient wants to d/c dialysis.     Had to fax orders, demographics and records to scheduling team because schedules are currently not open. They will call her when they do open.     [phone] (M)

## 2021-05-29 NOTE — TELEPHONE ENCOUNTER
Provider Communication  Who is calling:  LOREN  Facility in which provider is associated:  [facility]  Reason for call:  TC calling back in regards to a previous call asking to have patient seen at their facility. TC states she did speak with the provider at [facility] and was informed that the patient has an upcoming dialysis appointment in the next week and due to that appointment the patient does not need to be seen at [facility].   Urgency for return call:  n/a  Okay to leave detailed message?:  No

## 2021-05-29 NOTE — TELEPHONE ENCOUNTER
She should see Dr. Hola Wolf in clinic.  She is interested in stopping dialysis to see if she can get by without it.  I told her she should discuss with Dr. Hola Wolf.  She also has a lot of questions about hemodialysis versus peritoneal dialysis versus kidney transplantation.

## 2021-05-29 NOTE — PROGRESS NOTES
ASSESSMENT AND PLAN:  Kevin Ellis 73 y.o. female is here for follow-up of severe uncontrolled rheumatoid arthritis despite being on Humira for several months, while it has improved some of the joints she continues to have active synovitis in her right elbow, we discussed options, with renal failure she has limited options in terms of oral DMARDs, she is going to go for rituximab, she would discontinue Humira, today she would like to pursue local injection done with 40 mg of Kenalog under ultrasound guidance into the right elbow joint..  Management principles of osteoarthritis reviewed.  Follow-up here in 3 months.    Diagnoses and all orders for this visit:    Seropositive rheumatoid arthritis of multiple sites (H)  -     Ambulatory referral to Infusion Therapy  -     triamcinolone acetonide 40 mg/mL injection 40 mg (KENALOG-40)    Cyclic citrullinated peptide (CCP) antibody positive    High risk medication use    Other orders  -     sodium chloride 0.9% 250 mL  -     acetaminophen tablet 650 mg (TYLENOL)  -     diphenhydrAMINE injection 25 mg (BENADRYL)  -     riTUXimab 1,000 mg in sodium chloride 0.9% 900 mL chemo (RITUXAN)  -     riTUXimab 1,000 mg in sodium chloride 0.9% 150 mL chemo (RITUXAN)  -     heparin lockflush (PF) porcine 300-600 Units  -     sodium chloride flush 10 mL (NS)  -     diphenhydrAMINE injection 50 mg (BENADRYL)  -     famotidine 20 mg/2 mL injection 20 mg (PEPCID)  -     hydrocortisone sod succ (PF) injection 100 mg  -     acetaminophen tablet 1,000 mg (TYLENOL)  -     sodium chloride 0.9% 500 mL      HISTORY OF PRESENTING ILLNESS:  Kevin Ellis, 73 y.o., female is here for follow-up.  She has seropositive severe rheumatoid arthritis in the background of severe renal impairment.  She noted worsening pain.  This is in her right elbow.  This is severe.  This is been troubling her for the past more than 3 months, she rates it 9.0/10, other joints are  doing better, despite being on Humira for several months, her shoulders do not hurt her as well that she had the injections done on her previous visit.  She has difficulty performing a variety of day-to-day activities.  Morning stiffness is 30 minutes.  Other joints do not trouble her.  There is no fever or weight loss she has not had mouth ulcers no cough no rash. She has noted no family or personal history of psoriasis.  She is accompanied by her  and the family.   Further historical information and ADL limitations as noted in the multidimensional health assessment questionnaire attached in the EMR.  .     ALLERGIES:Ibuprofen    PAST MEDICAL/ACTIVE PROBLEMS/MEDICATION/ FAMILY HISTORY/SOCIAL DATA:  The patient has a family history of  Past Medical History:   Diagnosis Date     Anemia      Arthritis      Chronic kidney disease      Coronary artery disease      Cyclic citrullinated peptide (CCP) antibody positive      Dialysis patient (H)      Diarrhea      ESRD (end stage renal disease) (H)      Hypertension      Inverted nipple      Myocardial infarction (H)      Secondary renal hyperparathyroidism (H)      Seropositive rheumatoid arthritis (H)      Shortness of breath      Social History     Tobacco Use   Smoking Status Never Smoker   Smokeless Tobacco Never Used     Patient Active Problem List   Diagnosis     Seropositive rheumatoid arthritis of multiple sites (H)     High risk medication use     Anemia of chronic renal failure     Cyclic citrullinated peptide (CCP) antibody positive     ESRD (end stage renal disease) on dialysis (H)     Secondary renal hyperparathyroidism (H)     Compression fracture of body of thoracic vertebra (H)     Ovarian cyst follow-up May 2020     Current Outpatient Medications   Medication Sig Dispense Refill     acetaminophen (TYLENOL) 650 MG CR tablet Take 650 mg by mouth every 8 (eight) hours as needed.       adalimumab (HUMIRA PEN) 40 mg/0.8 mL PnKt Inject 40 mg under the  skin every 14 (fourteen) days. 1 kit 5     calcium acetate (PHOSLO) 667 mg capsule Take 2 capsules (1,334 mg total) by mouth 3 (three) times a day with meals. 180 capsule 0     HYDROmorphone (DILAUDID) 2 MG tablet Take 2 mg by mouth every 4 (four) hours as needed.       No current facility-administered medications for this visit.      DETAILED EXAMINATION  06/18/19  :  Vitals:    06/18/19 1443   BP: 110/68   Patient Site: Right Arm   Patient Position: Sitting   Cuff Size: Adult Regular   Pulse: 84   Weight: 138 lb (62.6 kg)     Alert oriented. Head including the face is examined for malar rash, heliotropes, scarring, lupus pernio. Eyes examined for redness such as in episcleritis/scleritis, periorbital lesions.   Neck/ Face examined for parotid gland swelling, range of motion of neck.  Left upper and lower and right upper and lower extremities examined for tenderness, swelling, warmth of the appendicular joints, range of motion, edema, rash.  Some of the important findings included: Swelling and herman synovitis of the right elbow joint.  Full range of motion of the shoulders.  No other upper extremity joints show synovitis.              LAB / IMAGING DATA:  ALT   Date Value Ref Range Status   02/20/2019 <9 0 - 45 U/L Final   02/17/2019 <9 0 - 45 U/L Final   02/15/2019 <9 0 - 45 U/L Final     Albumin   Date Value Ref Range Status   02/27/2019 2.4 (L) 3.5 - 5.0 g/dL Final   02/20/2019 2.8 (L) 3.5 - 5.0 g/dL Final   02/18/2019 3.0 (L) 3.5 - 5.0 g/dL Final     Creatinine   Date Value Ref Range Status   04/05/2019 4.65 (H) 0.60 - 1.10 mg/dL Final   04/02/2019 7.11 (HH) 0.60 - 1.10 mg/dL Final   02/27/2019 7.03 (HH) 0.60 - 1.10 mg/dL Final       WBC   Date Value Ref Range Status   04/02/2019 10.1 4.0 - 11.0 thou/uL Final   02/22/2019 11.8 (H) 4.0 - 11.0 thou/uL Final     Hemoglobin   Date Value Ref Range Status   04/02/2019 10.2 (L) 12.0 - 16.0 g/dL Final   02/27/2019 8.3 (L) 12.0 - 16.0 g/dL Final   02/22/2019 8.2 (L)  12.0 - 16.0 g/dL Final     Platelets   Date Value Ref Range Status   04/02/2019 199 140 - 440 thou/uL Final   02/27/2019 126 (L) 140 - 440 thou/uL Final   02/25/2019 111 (L) 140 - 440 thou/uL Final       Lab Results   Component Value Date    RF 61.3 (H) 07/07/2017    SEDRATE 82 (H) 09/05/2017

## 2021-05-29 NOTE — PROGRESS NOTES
Office Visit - Follow Up   Kevin Ellis   73 y.o. female    Date of Visit: 6/12/2019    Chief Complaint   Patient presents with     Diarrhea     Abdominal Pain        Assessment and Plan   1. ESRD (end stage renal disease) on dialysis (H)  Continue with dialysis, discussed with Dr. Wolf regarding dialysis options as well as renal transplant.  She is interested in determining if she might do okay without dialysis as she does continue to urinate and I have asked her to discuss this with Dr. Wolf  - Ambulatory referral to Nephrology    2. Seropositive rheumatoid arthritis of multiple sites (H)  Continue Humira per rheumatology    3. Functional diarrhea  Colonoscopy and biopsy unremarkable, continue with fiber supplementation and Imodium as needed    Return in about 1 month (around 7/10/2019) for recheck.     History of Present Illness   This 73 y.o. old woman comes in for follow-up.  She has lots of questions about dialysis and renal transplant.  She also wonders if she could stop dialysis and may do okay without it.  She does make urine and estimates about a liter a day.  She is contemplating hemo-versus peritoneal dialysis and also is contemplating putting her name on a transplant list.  She wonders if she is too old to get a kidney transplant.  She does have ongoing diarrhea and had a colonoscopy which was unremarkable including biopsy.  She saw gastroenterology.  Fiber supplementation was started which has been helpful and Imodium is also been helpful.    Review of Systems: A comprehensive review of systems was negative except as noted.     Medications, Allergies and Problem List   Reviewed, reconciled and updated     Physical Exam   General Appearance:   No acute distress    /58 (Patient Site: Left Arm, Patient Position: Sitting, Cuff Size: Adult Regular)   Pulse 90   Ht 5' (1.524 m)   Wt 138 lb (62.6 kg)   LMP 05/05/1985   SpO2 95%   BMI 26.95 kg/m      HEENT exam is  unremarkable  Neck supple no thyromegaly or nodule palpable  Lymphatic no cervical lymphadenopathy  Cardiovascular regular rate and rhythm no murmur gallop or rub  Pulmonary lungs are clear to auscultation bilaterally  Gastrointestinall abdomen soft nontender nondistended no organomegaly  Neurologic exam is non focal  Psychiatric pleasant, no confusion or agitation        Additional Information   Current Outpatient Medications   Medication Sig Dispense Refill     adalimumab (HUMIRA PEN) 40 mg/0.8 mL PnKt Inject 40 mg under the skin every 14 (fourteen) days. 1 kit 5     aspirin 81 MG EC tablet Take 1 tablet (81 mg total) by mouth daily.  0     calcium acetate (PHOSLO) 667 mg capsule Take 2 capsules (1,334 mg total) by mouth 3 (three) times a day with meals. 180 capsule 0     No current facility-administered medications for this visit.      Allergies   Allergen Reactions     Ibuprofen      Social History     Tobacco Use     Smoking status: Never Smoker     Smokeless tobacco: Never Used   Substance Use Topics     Alcohol use: No     Drug use: No       Review and/or order of clinical lab tests:  Review and/or order of radiology tests:  Review and/or order of medicine tests:  Discussion of test results with performing physician:  Decision to obtain old records and/or obtain history from someone other than the patient:  Review and summarization of old records and/or obtaining history from someone other than the patient and.or discussion of case with another health care provider:  Independent visualization of image, tracing or specimen itself:    Time:      Celso Maurice MD

## 2021-05-29 NOTE — TELEPHONE ENCOUNTER
Called back to [facility] to schedule patient for consult with Dr. Wolf or AMPARO biggs rounds at [facility]. Patient wants to d/c dialysis.     Had to fax orders, demographics and records to scheduling team because schedules are currently not open. They will call her when they do open.     [phone] (M)

## 2021-05-30 NOTE — TELEPHONE ENCOUNTER
Kelly LU from [facility] dialysis was wondering if another fistula was going to be placed? The L AVF surgery on 4/2019 has not matured yet. Writer informed her that pt is to f/u on 7/3/19. Her understanding is that the pt wants to continue dialysis. She did not want to in the past due to pain.

## 2021-05-30 NOTE — TELEPHONE ENCOUNTER
Per MD requested, called Dr. Wolf's office and provided our back line for Dr. Wolf call back. Per TC from  Dr. Wolf office, Dr.. Wolf is covering at the hospital, and let him call back as soon as he can.

## 2021-05-30 NOTE — PROGRESS NOTES
Sandie pt. Needs peritoneal dialysis catheter placed.code [id number]. advised to have a H and P.  Patient is not currently using dialysis.will preauth if decides to proceed.   Kristyn JOB#[id number]

## 2021-05-30 NOTE — PROGRESS NOTES
Sandie pt. Needs peritoneal dialysis. Patient is wondering why she doesn't have the strength to do anything. She is not currently taking any medications. She says she has a bad stomachache with n/v frequently, and says that is why she was hospitalized in January. She thought it would go away with dialysis, and she continue to feel more and more weak. She's wondering if surgery would be necessary. She was also told that she has osteoporosis but no treatment can be done if on dialysis?

## 2021-05-30 NOTE — TELEPHONE ENCOUNTER
[facility] lab calling with critical lab value: Cr 6.52 drawn 1029 order Dr. Jimnéez. Pt is on dialysis. On call provider was paged, Dr Alex states no further action is needed at this time.      Ben Vaughn RN, [facility] Triage/Med Refill 7/8/2019 6:36 PM

## 2021-05-30 NOTE — PROGRESS NOTES
Pt came into infusion clinic for her 1st dose of Rituxan as ordered. Medications explained to pt who verbalized understanding. Pt pre-medicated as ordered. IV patent throughout infusion. Pt tolerated infusion with no complications. Pt left infusion clinic via ambulatory and will RTC as sched.

## 2021-05-30 NOTE — PROGRESS NOTES
Patient ambulated into Infusion Care accompanied by an  form Tram Johnson. A peripheral Iv was inserted into her left hand with excellent blood return. Premeds administered and PPE donned. Rituxan double checked with Nick LU. Patient tolerated infusion without any problems. Peripheral IV flushed with Normal Saline and discontinued. Dry 2 x 2 gauze and Coban applied to IV site. All questions answered through the  and cris was discharged home.

## 2021-05-30 NOTE — PROGRESS NOTES
VASCULAR SURGERY OUTPATIENT CONSULT OR VISIT   VASCULAR SURGEON: Sheri Hooper MD    LOCATION:  [facility]    Kevin Ellis   Medical Record #:  [id number]  YOB: 1945  Age:  74 y.o.     Date of Service: 7/3/2019    PRIMARY CARE PROVIDER: Celso Maurice MD      Reason for consultation: Reevaluation for dialysis access    IMPRESSION: Patient with nicely mature basilic vein fistula that still needs to be transposed for use.  When I last saw her she had refused this operation with said that she wanted to be considered for peritoneal dialysis.  I have supported this and referred her to Dr. Gaming, but she did not make any appointments.  On this visit she tells me that she was worried that I was upset that I did     RECOMMENDATION: Refer patient to Dr. Bandar Gaming for evaluation for peritoneal dialysis catheter placement.  If this cannot be done or is unsuccessful we can always make sure her basilic vein fistula to get her hemodialysis option other than the PermCath she has in place.  Important to keep this fistula protected in this context.  I will see her on a as needed basis.    HPI:  Kevin Ellis is a 74 y.o. female who was seen today in follow-up for her hemodialysis access.  I have placed a basilic vein fistula and this is gone to mature nicely.  The fistula still needs to be transposed to be accessible for dialysis.  The patient has had a change of heart around this and would like to be considered for peritoneal dialysis.  Her main concern seems to be a dislike going to the dialysis center.  She discussed with me how she thought it was terrible patient cannot even walk To go there 3 times a week.  She seems to have looked into peritoneal dialysis and like to give it a try.  We discussed this at her last visit but she has not yet been seen by Dr. Gaming.  Her excuse for this is that she has been so busy with other appointments.   She was seen for evaluation of the transplant he tells me that her physician told her that she would have a tough time going through the transplant surgery if she is not been willing to get a fistula placed.  If this is indeed the discussion, clearly misconstrue's desire not to have hemodialysis with a fear of surgery, which she does not really seem to have.    We spent significant time with a  discussing the pros and cons of peritoneal dialysis over hemodialysis.  While there he would mention she does seem to understand what this is about.    No other new changes in her health.  Continues to dialyze through a right IJ PermCath.    PHH:    Past Medical History:   Diagnosis Date     Anemia      Arthritis      Chronic kidney disease      Coronary artery disease      Cyclic citrullinated peptide (CCP) antibody positive      Dialysis patient (H)      Diarrhea      ESRD (end stage renal disease) (H)      Hypertension      Inverted nipple      Myocardial infarction (H)      Secondary renal hyperparathyroidism (H)      Seropositive rheumatoid arthritis (H)      Shortness of breath         Past Surgical History:   Procedure Laterality Date     AV FISTULA PLACEMENT Left 2/21/2019    Procedure: LEFT ARM  ARTERIOVENOUS GRAFT PLACEMENT;  Surgeon: Sheri Hooper MD;  Location: [facility] OR;  Service: General     AV FISTULA PLACEMENT Left 4/5/2019    Procedure: LEFT ARM BASILIC FISTULA STAGE 1;  Surgeon: Sheri Hooper MD;  Location: [facility] OR;  Service: General     IR TUNNELED CATHETER INSERT  2/15/2019     TOTAL HIP ARTHROPLASTY Left     per new patient questionaire     US BREAST CYST ASPIRATION RIGHT Right 5/3/2019       ALLERGIES:  Ibuprofen    MEDS:    Current Outpatient Medications:      acetaminophen (TYLENOL) 650 MG CR tablet, Take 650 mg by mouth every 8 (eight) hours as needed., Disp: , Rfl:      calcium acetate (PHOSLO) 667 mg capsule, Take 2 capsules (1,334 mg total) by mouth 3 (three) times  a day with meals., Disp: 180 capsule, Rfl: 0     HYDROmorphone (DILAUDID) 2 MG tablet, Take 2 mg by mouth every 4 (four) hours as needed., Disp: , Rfl:     SOCIAL HABITS:    Social History     Tobacco Use   Smoking Status Never Smoker   Smokeless Tobacco Never Used       Social History     Substance and Sexual Activity   Alcohol Use No       Social History     Substance and Sexual Activity   Drug Use No       FAMILY HISTORY:    Family History   Problem Relation Age of Onset     Heart attack Mother      No Medical Problems Father      No Medical Problems Brother      No Medical Problems Sister      No Medical Problems Brother      No Medical Problems Son      No Medical Problems Daughter        REVIEW OF SYSTEMS:    A 12 point ROS was reviewed and except for what is listed in the HPI above, all others are negative    PE:  /64 (Patient Site: Right Arm, Patient Position: Sitting, Cuff Size: Adult Large)   Pulse 76   Temp 98  F (36.7  C) (Oral)   Resp 16   Ht 5' (1.524 m)   Wt 135 lb 1.6 oz (61.3 kg)   LMP 05/05/1985   BMI 26.38 kg/m    Wt Readings from Last 1 Encounters:   07/03/19 135 lb 1.6 oz (61.3 kg)     Body mass index is 26.38 kg/m .    EXAM:  GENERAL: This is a well-developed 74 y.o. female who appears her stated age  EYES: Grossly normal.  MOUTH: Buccal mucosa normal   CARDIAC:  Not assessed  CHEST/LUNG:  Not Assessed  GASTROINTESINAL (ABDOMEN):Not Assessed   MUSCULOSKELETAL: Grossly normal and both lower extremities are intact.  HEME/LYMPH: No lymphedema  NEUROLOGIC: Focally intact, Alert and oriented x 3.   PSYCH: appropriate affect  INTEGUMENT: No open lesions or ulcers  Pulse Exam:   Excellent thrill in left upper arm basilic vein fistula      DIAGNOSTIC STUDIES:     Images:  Us Hemodialysis Access Fistula Or Graft    Result Date: 7/3/2019  LEFT ARM HEMODIALYSIS FISTULA ULTRASOUND Indication:  Surveillance of maturation and patency of AV Fistula   Access Description:  Left arm Previous:  4/17/19  Technique: Color Doppler and spectral waveform analysis performed at the inflow native artery and outflow native vein. Location Description Blood Flow Volume (cc/min) Velocities (cm/sec) Waveforms Pattern Inflow Native Artery Distal Brachial   291   Monophasic Arterial/ Venous Anastamosis   482 Monophasic Outflow Native Vein Distal Basilic  1590   Monophasic Outflow Native Vein Mid Basilic  2846  Monophasic Outflow Native Vein Proximal Basilic  1265  Monophasic Subclavian Vein    Monophasic  Comments: Branch documented from Prox Basilic V Impression: Nicely maturing basilic vein fistula with excellent flow volumes and caliber of 9 mm diameter suggesting adequate for transposition       I personally reviewed the images and my interpretation is that the duplex shows excellent flow volumes and a 9 mm diameter basilic vein appears to be very adequate ready for transposition.    LABS:      Sodium   Date Value Ref Range Status   04/05/2019 139 136 - 145 mmol/L Final   04/02/2019 136 136 - 145 mmol/L Final   02/27/2019 136 136 - 145 mmol/L Final     Potassium   Date Value Ref Range Status   04/05/2019 4.5 3.5 - 5.0 mmol/L Final   04/02/2019 6.0 (H) 3.5 - 5.0 mmol/L Final   04/02/2019 5.9 (H) 3.5 - 5.0 mmol/L Final     Chloride   Date Value Ref Range Status   04/05/2019 102 98 - 107 mmol/L Final   04/02/2019 109 (H) 98 - 107 mmol/L Final   02/27/2019 101 98 - 107 mmol/L Final     BUN   Date Value Ref Range Status   04/05/2019 25 8 - 28 mg/dL Final   04/02/2019 68 (H) 8 - 28 mg/dL Final   02/27/2019 49 (H) 8 - 28 mg/dL Final     Creatinine   Date Value Ref Range Status   04/05/2019 4.65 (H) 0.60 - 1.10 mg/dL Final   04/02/2019 7.11 (HH) 0.60 - 1.10 mg/dL Final   02/27/2019 7.03 (HH) 0.60 - 1.10 mg/dL Final     Hemoglobin   Date Value Ref Range Status   04/02/2019 10.2 (L) 12.0 - 16.0 g/dL Final   02/27/2019 8.3 (L) 12.0 - 16.0 g/dL Final   02/22/2019 8.2 (L) 12.0 - 16.0 g/dL Final     Platelets   Date Value Ref  Range Status   04/02/2019 199 140 - 440 thou/uL Final   02/27/2019 126 (L) 140 - 440 thou/uL Final   02/25/2019 111 (L) 140 - 440 thou/uL Final     INR   Date Value Ref Range Status   02/18/2019 1.24 (H) 0.90 - 1.10 Final   02/15/2019 1.22 (H) 0.90 - 1.10 Final       Total time spent 25 minutes face to face with patient with more than 50% time spent in counseling and coordination of care.    Sheri Hooper MD  VASCULAR SURGERY

## 2021-05-30 NOTE — TELEPHONE ENCOUNTER
----- Message from Alexander Jiménez MD sent at 7/20/2019 10:54 AM CDT -----  [place]: Your parathyroid hormone level is elevated to 374.  This is consistent with renal secondary hyperparathyroidism.  I will discuss this with Dr. Wolf.  The vitamin D level is fair at 30.7.  The TSH, (a thyroid test), is in the normal range.  Test for gluten sensitive enteropathy show an elevation in antibodies.  This conceivably could increase risks for malabsorption and bone loss.  I will discuss the significance of this with Dr. Maurice.  Other labs looking for secondary causes of low bone density are good.  I will get back to you after discussing treatment options with Radha Wolf and Josafat.    Alexanedr Jiménez MD

## 2021-05-30 NOTE — PROGRESS NOTES
ASSESSMENT:  1.  Presumed osteoporosis:  I do not see a previous bone density test, but she has had a fragility fracture at T12.  She has numerous risk factors including long-standing rheumatoid arthritis with prolonged steroid use, early menopause, and chronic renal failure with presumed renal secondary hyperparathyroidism.  Bone density study will be ordered as a baseline.  She will have monitoring labs done to check for secondary causes of low bone density.  I will discuss further management with Dr. Wolf.  She would not be a candidate for Forteo given her history of hyperparathyroidism.  She is not a candidate for bisphosphonates given her chronic renal failure.  Prolia would be a potential option    PLAN:  1.  Labs to screen for secondary causes of low bone density    2.  Check DXA scan    3.  Lab results and options for management will be discussed with Dr. Wolf to make sure this does not interfere with her treatment for chronic renal failure.    4.  Recheck bone density after one year on active therapy with clinic visit to follow    Orders Placed This Encounter   Procedures     DXA Bone Density Scan     Standing Status:   Future     Standing Expiration Date:   7/7/2020     Order Specific Question:   Can the procedure be changed per Radiologist protocol?     Answer:   Yes     Parathyroid Hormone Intact     Thyroid Stimulating Hormone (TSH)     Vitamin D, Total (25-Hydroxy)     Basic Metabolic Panel     Electrophoresis, Protein, Serum     Tissue Transglutaminase,IgA & IgG     Phosphorus     There are no discontinued medications.    Return in about 1 year (around 7/8/2020) for Recheck.    ASSESSED PROBLEMS:  1. Age-related osteoporosis with current pathological fracture, sequela  Parathyroid Hormone Intact    Thyroid Stimulating Hormone (TSH)    Vitamin D, Total (25-Hydroxy)    Basic Metabolic Panel    Electrophoresis, Protein, Serum    Tissue Transglutaminase,IgA & IgG    DXA Bone Density Scan    Phosphorus  "      CHIEF COMPLAINT:  Chief Complaint   Patient presents with     Osteoporosis Consult       HISTORY OF PRESENT ILLNESS:  Kevin is a 74 y.o. female seen for evaluation of osteoporosis.  She has a history of a fragility fracture of T12.  She does not have a previous DXA scan available on the chart.  He has numerous risk factors for osteoporosis.  She has long-standing rheumatoid arthritis with previous prolonged therapy with prednisone for at least 8 years.  She reports early menopause before age 41.  She is a non-smoker.  There is no family history of hip fracture that she is aware of.    She reports that she has been on hemodialysis for 6 months.  Is hoping that she will be considered for renal transplant    Has seen Dr. Ochoa for rheumatoid arthritis    REVIEW OF SYSTEMS:  Has noted significant height loss since young adulthood.  Comprehensive review of systems is otherwise negative except as mentioned above    PFSH:  She is seen with an     TOBACCO USE:  Social History     Tobacco Use   Smoking Status Never Smoker   Smokeless Tobacco Never Used       VITALS:  Vitals:    07/08/19 0946   BP: 118/60   Patient Site: Right Arm   Patient Position: Sitting   Cuff Size: Adult Regular   Pulse: 74   SpO2: 98%   Weight: 133 lb 8 oz (60.6 kg)   Height: 4' 9.48\" (1.46 m)     Wt Readings from Last 3 Encounters:   07/08/19 133 lb 8 oz (60.6 kg)   07/03/19 135 lb 1.6 oz (61.3 kg)   06/18/19 138 lb (62.6 kg)       PHYSICAL EXAM:  Constitutional:   Reveals an alert pleasant talkative woman.  She has moderate thoracic kyphosis.  She can ambulate fairly easily without assistance Vitals: per nursing notes.  HEENT: Atraumatic.  Neck:  Supple, no carotid bruits or adenopathy.  Back:  No spine or CVA pain.  Moderate thoracic kyphosis  Thorax:  No bony deformities.  Lungs: Clear to A&P without rales or wheezes.  Respiratory effort normal.  Cardiac:   Regular rate and rhythm, normal S1, S2, no murmur or " gall  Extremities:   No peripheral edema, pulses in the feet intact.    Skin:  No jaundice, peripheral cyanosis or lesions to suggest malignancy.  Neuro:  Alert and oriented.   No gross focal deficits.  Psychiatric:  Memory intact, mood appropriate.    DATA REVIEWED:  Additional History from Old Records Summarized (2): None.  Decision to Obtain Records (1): None.   Radiology Tests Summarized or Ordered (1): None.  Labs Reviewed or Ordered (1): None.  Medicine Test Summarized or Ordered (1): None.   Independent Review of EKG or X-RAY(2 each): None.    The visit lasted a total of 29 minutes face to face with the patient. Over 50% of the time was spent counseling and educating the patient about management of osteoporosis.    .    Dragon dictation was used for this note. Speech recognition errors are a possibility.     MEDICATIONS:  Current Outpatient Medications   Medication Sig Dispense Refill     calcium acetate (PHOSLO) 667 mg capsule Take 2 capsules (1,334 mg total) by mouth 3 (three) times a day with meals. 180 capsule 0     HYDROmorphone (DILAUDID) 2 MG tablet Take 2 mg by mouth every 4 (four) hours as needed.       acetaminophen (TYLENOL) 650 MG CR tablet Take 650 mg by mouth every 8 (eight) hours as needed.       No current facility-administered medications for this visit.

## 2021-05-30 NOTE — PROGRESS NOTES
[facility] Consult    HPI:    74 y.o. year old female who I have been consulted by Celso Maurice MD for evaluation of No chief complaint on file.    She is here for consultation related to possible peritoneal dialysis catheter placement.  She has a hemodialysis for stage procedure done already but has not started with dialysis yet.  Dr. Collado referred her here for evaluation and possible catheter placement.  She says she has not followed up regularly with any nephrologist she does not know when she needs to start dialysis or how things are going.  Through an  and her  granddaughter is here with her today.    Allergies:Ibuprofen    Past Medical History:   Diagnosis Date     Anemia      Arthritis      Chronic kidney disease      Coronary artery disease      Cyclic citrullinated peptide (CCP) antibody positive      Dialysis patient (H)      Diarrhea      ESRD (end stage renal disease) (H)      Hypertension      Inverted nipple      Myocardial infarction (H)      Secondary renal hyperparathyroidism (H)      Seropositive rheumatoid arthritis (H)      Shortness of breath        Past Surgical History:   Procedure Laterality Date     AV FISTULA PLACEMENT Left 2/21/2019    Procedure: LEFT ARM  ARTERIOVENOUS GRAFT PLACEMENT;  Surgeon: Sheri Hooper MD;  Location: [facility] OR;  Service: General     AV FISTULA PLACEMENT Left 4/5/2019    Procedure: LEFT ARM BASILIC FISTULA STAGE 1;  Surgeon: Sheri Hooper MD;  Location: [facility] OR;  Service: General     IR TUNNELED CATHETER INSERT  2/15/2019     TOTAL HIP ARTHROPLASTY Left     per new patient questionaire     US BREAST CYST ASPIRATION RIGHT Right 5/3/2019       CURRENT MEDS:  Current Outpatient Medications on File Prior to Visit   Medication Sig Dispense Refill     [DISCONTINUED] acetaminophen (TYLENOL) 650 MG CR tablet Take 650 mg by mouth every 8 (eight) hours as needed.       [DISCONTINUED] calcium acetate (PHOSLO) 667 mg capsule Take 2  capsules (1,334 mg total) by mouth 3 (three) times a day with meals. 180 capsule 0     [DISCONTINUED] HYDROmorphone (DILAUDID) 2 MG tablet Take 2 mg by mouth every 4 (four) hours as needed.       No current facility-administered medications on file prior to visit.        Family History   Problem Relation Age of Onset     Heart attack Mother      No Medical Problems Father      No Medical Problems Brother      No Medical Problems Sister      No Medical Problems Brother      No Medical Problems Son      No Medical Problems Daughter         reports that she has never smoked. She has never used smokeless tobacco. She reports that she does not drink alcohol or use drugs.    Review of Systems:  Negative except end-stage renal disease need for dialysis. Otherwise twelve system of review is negative.      OBJECTIVE:  There were no vitals filed for this visit.  There is no height or weight on file to calculate BMI.    EXAM:  GENERAL: This is a well-developed 74 y.o. female who appears her stated age  HEAD: normocephalic  HEENT: Pupils equal and reactive bilaterally  MOUTH: mucus membranes intact  CARDIAC: RRR without murmur  CHEST/LUNG:  Clear to auscultation  ABDOMEN: Soft, nontender, nondistended, no masses  EXTREMITIES: Grossly normal, warm,   NEUROLOGIC: Focally intact, nonfocal  INTEGUMENT: No open lesions or ulcers  VASCULAR: Pulses intact, symmetrical upper and lower extremities.  Good thrill to her fistula left arm.        LABS:  Lab Results   Component Value Date    WBC 10.1 04/02/2019    HGB 10.2 (L) 04/02/2019    HCT 35.2 04/02/2019     (H) 04/02/2019     04/02/2019     INR/Prothrombin Time      No results found for: HGBA1C  Lab Results   Component Value Date    ALT <9 02/20/2019    AST 16 02/20/2019    ALKPHOS 53 02/20/2019    BILITOT 0.3 02/20/2019        Images: Reviewed ultrasounds these are of her fistula in the arm.    Assessment/Plan:   1. ESRD (end stage renal disease) (H)    She is end-stage  renal disease has not started dialysis yet.  She has a temporary catheter in place in her right IJ.  She is Dr. Collado is placed graft in her left arm and first stage of a fistula placement.    After this is begun she is interested in peritoneal dialysis.  And she comes in here today for consultation.  I went through and discussed with peritoneal dialysis would entail the surgery the procedure the techniques involved and risks and benefits.  She is more curious about some basic issues related to dialysis.  And transplantation I am to see her back to her nephrologist can find it number the name of the Dr. Brock who is seeing her but in the past and I will have her back to see her.  She can make decisions from there I have placed orders to get a peritoneal dialysis catheter placed this to be done at [facility] and will be happy to set this up whenever she wants to do so if she desires to go in that direction.    - Verify informed consent for procedure; Standing  - Verify informed consent for Anesthesia; Standing  - Skin prep - Clip hair as needed; Standing  - Apply 2% Chlorhexidine Gluconate cloth (Jacob Cloth) to surgical area.; Standing  - Insert and maintain IV; Standing  - sodium chloride bacteriostatic 0.9 % injection 0.1-0.3 mL  - sodium chloride flush 3 mL (NS)  - NaCl 0.9% IR 0.9 % irrigation solution 1,000 mL (NS)  - Case Request (Do not enter this order if case already scheduled): INSERTION, CATHETER, PERITONEAL, LAPAROSCOPIC; Standing  - Place sequential compression device; Standing  - ceFAZolin 2 g in 100 mL in D5W (ANCEF)  - Case Request (Do not enter this order if case already scheduled): INSERTION, CATHETER, PERITONEAL, LAPAROSCOPIC      No follow-ups on file.     Bandar Gaming MD  [facility] Department of Surgery

## 2021-05-30 NOTE — PATIENT INSTRUCTIONS - HE
1.  Schedule DXA scan    2.  Labs to evaluate for causes of osteoporosis.    3.  I will discuss treatment options with Dr. Wolf and get back to you.    4.  Recheck bone density in one year with clinic visit to follow.

## 2021-05-30 NOTE — TELEPHONE ENCOUNTER
Critical lab result: Creatinine= 9.14. Drawn @ 9:30am Ordered by Dr Ochoa. (RA)   Seen today in Dr ROSEY Gaming [facility]: dx with ESRD  For possible placement of peritoneal dialysis catheter.   7/8/2019 Creatinine =6.52  AV fistula left arm 1st stage of a fistula placement per Dr Collado.  Dr KIKO Armstrong on call, paged @ 9:14pm. No further orders given tonight.     Message will be forwarded to Lio Maurice and Don.    Haleigh Munguia RN [facility] Triage Nurse

## 2021-05-31 VITALS — BODY MASS INDEX: 39.04 KG/M2 | WEIGHT: 186 LBS | HEIGHT: 58 IN

## 2021-05-31 VITALS — BODY MASS INDEX: 38.12 KG/M2 | HEIGHT: 58 IN | WEIGHT: 181.6 LBS

## 2021-05-31 VITALS — BODY MASS INDEX: 32.98 KG/M2 | HEIGHT: 60 IN | WEIGHT: 168 LBS

## 2021-05-31 VITALS — HEIGHT: 58 IN | WEIGHT: 181.6 LBS | BODY MASS INDEX: 38.12 KG/M2

## 2021-05-31 VITALS — BODY MASS INDEX: 37.79 KG/M2 | WEIGHT: 180 LBS | HEIGHT: 58 IN

## 2021-05-31 NOTE — PROGRESS NOTES
ASSESSMENT AND PLAN:  Kevin Ellis 74 y.o. female is here for follow-up.  She has severe seropositive rheumatoid arthritis finally controlled with the rituximab infusion done on 712, 7/26/2019.  After a long time she feels significantly better as far as her joints are concerned.  Her main concern is now around hemodialysis that she detests, already having declined 3 times per week.  She is going to continue to follow-up and discuss this further with her nephrologist.  We discussed management of osteoarthritis.  Some residual discomfort in the right elbow may continue to trouble her likely secondary to the damage from RA.  As well as she is doing now will meet here in 4 months..     Diagnoses and all orders for this visit:    Seropositive rheumatoid arthritis of multiple sites (H)    Cyclic citrullinated peptide (CCP) antibody positive    ESRD (end stage renal disease) on dialysis (H)      HISTORY OF PRESENTING ILLNESS:  Kevin Ellis, 74 y.o., female is here for follow-up.  She has seropositive severe rheumatoid arthritis in the background of severe renal impairment, on replacement therapy, instead of 3 times per week she has decided to take the dialysis 2 times a week.  She has had good response to rituximab infusion.  She has noted significant improvement in her joint symptoms.  She noted mild discomfort in her right elbow.  This is with activity.  She noted morning stiffness of no more than 30 minutes.  She is able to do most of her day-to-day activities without any or with some difficulty as present joints are concerned.  There is no fever or weight loss she has not had mouth ulcers no cough no rash. She has noted no family or personal history of psoriasis.  She is accompanied by her  and the family.   Further historical information and ADL limitations as noted in the multidimensional health assessment questionnaire attached in the EMR.  .      ALLERGIES:Ibuprofen    PAST MEDICAL/ACTIVE PROBLEMS/MEDICATION/ FAMILY HISTORY/SOCIAL DATA:  The patient has a family history of  Past Medical History:   Diagnosis Date     Anemia      Arthritis      Chronic kidney disease      Coronary artery disease      Cyclic citrullinated peptide (CCP) antibody positive      Dialysis patient (H)      Diarrhea      ESRD (end stage renal disease) (H)      Hypertension      Inverted nipple      Myocardial infarction (H)      Secondary renal hyperparathyroidism (H)      Seropositive rheumatoid arthritis (H)      Shortness of breath      Social History     Tobacco Use   Smoking Status Never Smoker   Smokeless Tobacco Never Used     Patient Active Problem List   Diagnosis     Seropositive rheumatoid arthritis of multiple sites (H)     High risk medication use     Anemia of chronic renal failure     Cyclic citrullinated peptide (CCP) antibody positive     ESRD (end stage renal disease) on dialysis (H)     Secondary renal hyperparathyroidism (H)     Compression fracture of body of thoracic vertebra (H)     Ovarian cyst follow-up May 2020     No current outpatient medications on file.     No current facility-administered medications for this visit.      DETAILED EXAMINATION  08/01/19  :  Vitals:    08/01/19 1519   BP: 120/74   Patient Site: Right Arm   Patient Position: Sitting   Cuff Size: Adult Large   Pulse: 80   Weight: 133 lb (60.3 kg)     Alert oriented. Head including the face is examined for malar rash, heliotropes, scarring, lupus pernio. Eyes examined for redness such as in episcleritis/scleritis, periorbital lesions.   Neck/ Face examined for parotid gland swelling, range of motion of neck.  Left upper and lower and right upper and lower extremities examined for tenderness, swelling, warmth of the appendicular joints, range of motion, edema, rash.  Some of the important findings included: Flexion deformity of the right elbow about 5 degrees.  There is no synovitis in any  of the palpable joints of upper extremities.  Knees without effusion warmth or JLT.            LAB / IMAGING DATA:  ALT   Date Value Ref Range Status   07/30/2019 10 0 - 45 U/L Final   02/20/2019 <9 0 - 45 U/L Final   02/17/2019 <9 0 - 45 U/L Final     Albumin   Date Value Ref Range Status   07/30/2019 3.6 3.5 - 5.0 g/dL Final   02/27/2019 2.4 (L) 3.5 - 5.0 g/dL Final   02/20/2019 2.8 (L) 3.5 - 5.0 g/dL Final     Creatinine   Date Value Ref Range Status   07/30/2019 9.14 (HH) 0.60 - 1.10 mg/dL Final   07/08/2019 6.52 (HH) 0.60 - 1.10 mg/dL Final   04/05/2019 4.65 (H) 0.60 - 1.10 mg/dL Final       WBC   Date Value Ref Range Status   07/30/2019 5.3 4.0 - 11.0 thou/uL Final   04/02/2019 10.1 4.0 - 11.0 thou/uL Final     Hemoglobin   Date Value Ref Range Status   07/30/2019 12.2 12.0 - 16.0 g/dL Final   04/02/2019 10.2 (L) 12.0 - 16.0 g/dL Final   02/27/2019 8.3 (L) 12.0 - 16.0 g/dL Final     Platelets   Date Value Ref Range Status   07/30/2019 261 140 - 440 thou/uL Final   04/02/2019 199 140 - 440 thou/uL Final   02/27/2019 126 (L) 140 - 440 thou/uL Final       Lab Results   Component Value Date    RF 61.3 (H) 07/07/2017    SEDRATE 82 (H) 09/05/2017

## 2021-05-31 NOTE — PROGRESS NOTES
"  Office Visit - Follow Up   Kevin Ellis   74 y.o. female    Date of Visit: 8/2/2019    Chief Complaint   Patient presents with     Nausea        Assessment and Plan   1. Nausea  Could be related to end-stage renal disease, dialysis.  Some acid reflux will try an antacid and some Zofran.  We will also check for H. pylori.  - H. pylori Antigen, Stool(HPSAG)  - ondansetron (ZOFRAN) 4 MG tablet; Take 1 tablet (4 mg total) by mouth daily as needed for nausea.  Dispense: 30 tablet; Refill: 1    2. Gastroesophageal reflux disease with esophagitis  - H. pylori Antigen, Stool(HPSAG)  - ranitidine (ZANTAC) 150 MG tablet; Take 1 tablet (150 mg total) by mouth at bedtime.  Dispense: 30 tablet; Refill: 11    3. ESRD (end stage renal disease) on dialysis (H)  Continue dialysis    4. Seropositive rheumatoid arthritis of multiple sites (H)  Recently saw Dr. Hammond, per rheumatology    Return in about 4 weeks (around 8/30/2019) for recheck.     History of Present Illness   This 74 y.o. old woman comes in for follow-up.  She has been experiencing few weeks of nausea occasional vomiting and heartburn.  Seems to be worse with dialysis.  But she took some antacid which did seem to help.  Ongoing diarrhea of unclear etiology.  He has seen gastroenterology and had colonoscopy.  Otherwise feeling okay.  Still doing dialysis contemplating peritoneal dialysis.    Review of Systems: A comprehensive review of systems was negative except as noted.     Medications, Allergies and Problem List   Reviewed, reconciled and updated  Post Discharge Medication Reconciliation Status:      Physical Exam   General Appearance:   No acute distress    /56 (Patient Site: Right Arm, Patient Position: Sitting, Cuff Size: Adult Regular)   Pulse 75   Ht 4' 11.5\" (1.511 m)   Wt 130 lb (59 kg)   LMP 05/05/1985   SpO2 96%   BMI 25.82 kg/m      HEENT exam is unremarkable  Neck supple no thyromegaly or nodule palpable  Lymphatic no " cervical lymphadenopathy  Cardiovascular regular rate and rhythm no murmur gallop or rub  Pulmonary lungs are clear to auscultation bilaterally  Gastrointestinal abdomen soft nontender nondistended no organomegaly  Neurologic exam is non focal  Psychiatric pleasant, no confusion or agitation        Additional Information   Current Outpatient Medications   Medication Sig Dispense Refill     ondansetron (ZOFRAN) 4 MG tablet Take 1 tablet (4 mg total) by mouth daily as needed for nausea. 30 tablet 1     ranitidine (ZANTAC) 150 MG tablet Take 1 tablet (150 mg total) by mouth at bedtime. 30 tablet 11     No current facility-administered medications for this visit.      Allergies   Allergen Reactions     Ibuprofen      Social History     Tobacco Use     Smoking status: Never Smoker     Smokeless tobacco: Never Used   Substance Use Topics     Alcohol use: No     Drug use: No       Review and/or order of clinical lab tests:  Review and/or order of radiology tests:  Review and/or order of medicine tests:  Discussion of test results with performing physician:  Decision to obtain old records and/or obtain history from someone other than the patient:  Review and summarization of old records and/or obtaining history from someone other than the patient and.or discussion of case with another health care provider:  Independent visualization of image, tracing or specimen itself:    Time:      Celso Maurice MD

## 2021-05-31 NOTE — TELEPHONE ENCOUNTER
----- Message from Celso Maurice MD sent at 8/20/2019  4:32 PM CDT -----  Please call patient and let her know that she has abnormal testing for celiac disease and that I would like her to get an upper endoscopy.  Please set up for upper endoscopy, linked to diagnosis abnormal celiac antibody  ----- Message -----  From: Christy Dumont LPN  Sent: 8/20/2019   3:47 PM  To: Celso Maurice MD    Hello Dr. Maurice,    Dr. Jiménez would you like a call back from you regarding the above patient either today, or tomorrow morning between 10:00-12:00pm.  After tomorrow, Dr. Davis is out of the clinic on vacation until September 3rd.    Dr. Jiménez's phone number is [phone].

## 2021-06-01 VITALS — BODY MASS INDEX: 29.61 KG/M2 | WEIGHT: 150.8 LBS | HEIGHT: 60 IN

## 2021-06-01 VITALS — BODY MASS INDEX: 30.27 KG/M2 | WEIGHT: 155 LBS

## 2021-06-01 VITALS — WEIGHT: 150 LBS | BODY MASS INDEX: 29.29 KG/M2

## 2021-06-01 NOTE — PATIENT INSTRUCTIONS - HE
You have been scheduled for the following procedure:    Procedure: Left arm fistula  Date: 9/10/19  Location: [facility]      If you have any questions regarding your procedure, your instructions or should you need to reschedule or cancel your procedure, please contact Peri at the [facility].    Arterio-Venous Fistula Instructions    Your fistula was surgically created by joining an artery and a vein together in your arm. After surgery the vein needs time to enlarge and mature so that the needles can be easily inserted for dialysis. It is essential that you exercise your arm to help the vein enlarge by opening and closing your fist.     Use your arm for all of your usual activities. After seven days from the date of your surgery apply a tourniquet above your elbow tight enough to make the veins distend but not so tight that your hand goes to sleep or becomes extremely uncomfortable. Leave the tourniquet on for twenty to thirty minutes at a time, four times a day. Keep doing this until the fistula is used for dialysis.      The sutures are under the skin and will dissolve. You may shower twenty-four hours after surgery. Do not lie or sleep on your fistula.  If you notice any signs of infection (redness, swelling drainage) call us.     If you cannot feel a pulse or buzzing in your fistula, or if you have any signs of infection or questions, please contact [facility] at [phone].

## 2021-06-01 NOTE — PROGRESS NOTES
Hospital discharge follow up call to pt through , Wil, ID# [id number], no answer.  VM message left reminding pt of their appt 9/17/19 at 3:20pm.  RN will attempt call back at another time.     Ashleigh Phoenix RN Care Manager, Population Health

## 2021-06-01 NOTE — PROGRESS NOTES
Surgery/Procedure: L ARM AVF    Special Equipment: TBD    Location: [facility]    Date: 09/10/2019    Time: 15:00    Surgeon: Dr. Hooper    Assist: micheal to assist     Length of Surgery: 120 min    OR Confirmed/ :  Yes with [facility]  on 9/5/2019    Orders In: No    Provider Team Notified:  Yes    Entered on [employer] Calendar:  Yes    Post Op: 09/26/19 WB

## 2021-06-01 NOTE — PROGRESS NOTES
VASCULAR SURGERY OUTPATIENT CONSULT OR VISIT   VASCULAR SURGEON: Sheri Hooper MD    LOCATION:  [facility]    Kevin Ellis   Medical Record #:  [id number]  YOB: 1945  Age:  74 y.o.     Date of Service: 9/5/2019    PRIMARY CARE PROVIDER: Celso Maurice MD      Reason for consultation: Evaluation for dialysis access    IMPRESSION: Patient for whom I performed stage I a basilic vein fistula on the left arm is presents for second stage of her fistula.  Significant delay while she consider other options including peritoneal dialysis.  Currently ready for surgery.  Last duplex showed excellent flow volumes and the patient currently has a good thrill in the fistula.    RECOMMENDATION: Tentative plan for fistula transposition on Tuesday of next week.  Needs preop visit to her primary care physician before that.    HPI:  Kevin Ellis is a 74 y.o. female who was seen today in follow-up for her hemodialysis access.  This is a patient to has had a very difficult time deciding how she wants to be treated, from whether or not she wanted to be on dialysis, to how she wanted to get dialysis.  She has had a failed loop graft in the form that I placed the week that she required dialysis.  Most recently I placed a left upper arm basilic vein fistula and she had recovered well from that first stage operation.  When she came back she had excellent flow volumes and we discussed moving forward the second stage.  The patient was reticent to continue and want to look into peritoneal dialysis as she did not like the idea of the disfigurement of her upper arm or of having to go in for hemodialysis 3 times a week.  We referred her for peritoneal dialysis after discussing with her nephrologist and she saw Dr. Bandar Gaming who felt she was a reasonable candidate.  The patient did not agree to be scheduled for procedure and it appears she has changed her mind and wants  to undergo hemodialysis now.  She is very apologetic for having to change her mind so many times, telling me that this is who she is and that she just has a tough time making decisions.  She tells me that she is now very sure that she wants to move forward with completing the hemodialysis plan we had made in getting a fistula so that her PermCath can be removed.  She has not had any complications from a PermCath but it has been in place now for 6 months.    Patient has not had any other major health issues.  She has switched nephrologist and is now back under the care of Dr.Mark Pedraza, the nephrologist who first met her in hospital when she required hemodialysis and home I had talked to when she had revealed to me that she did not want to live on dialysis.  She therefore feels close to him and has a great appreciation for his care.    PHH:    Past Medical History:   Diagnosis Date     Anemia      Arthritis      Chronic kidney disease      Coronary artery disease      Cyclic citrullinated peptide (CCP) antibody positive      Dialysis patient (H)      Diarrhea      ESRD (end stage renal disease) (H)      Hypertension      Inverted nipple      Myocardial infarction (H)      Secondary renal hyperparathyroidism (H)      Seropositive rheumatoid arthritis (H)      Shortness of breath         Past Surgical History:   Procedure Laterality Date     AV FISTULA PLACEMENT Left 2/21/2019    Procedure: LEFT ARM  ARTERIOVENOUS GRAFT PLACEMENT;  Surgeon: Sheri Hooper MD;  Location: [facility];  Service: General     AV FISTULA PLACEMENT Left 4/5/2019    Procedure: LEFT ARM BASILIC FISTULA STAGE 1;  Surgeon: Sheri Hooper MD;  Location: [facility];  Service: General     IR TUNNELED CATHETER INSERT  2/15/2019     TOTAL HIP ARTHROPLASTY Left     per new patient questionaire     US BREAST CYST ASPIRATION RIGHT Right 5/3/2019       ALLERGIES:  Ibuprofen    MEDS:    Current Outpatient Medications:      ranitidine  (ZANTAC) 150 MG tablet, Take 1 tablet (150 mg total) by mouth at bedtime., Disp: 30 tablet, Rfl: 11    SOCIAL HABITS:    Social History     Tobacco Use   Smoking Status Never Smoker   Smokeless Tobacco Never Used       Social History     Substance and Sexual Activity   Alcohol Use No       Social History     Substance and Sexual Activity   Drug Use No       FAMILY HISTORY:    Family History   Problem Relation Age of Onset     Heart attack Mother      No Medical Problems Father      No Medical Problems Brother      No Medical Problems Sister      No Medical Problems Brother      No Medical Problems Son      No Medical Problems Daughter        REVIEW OF SYSTEMS:    A 12 point ROS was reviewed and except for what is listed in the HPI above, all others are negative    PE:  BP 90/58   Pulse 80   Temp 97.8  F (36.6  C)   Resp 18   Wt 130 lb (59 kg)   LMP 05/05/1985   BMI 25.82 kg/m    Wt Readings from Last 1 Encounters:   09/05/19 130 lb (59 kg)     Body mass index is 25.82 kg/m .    EXAM:  GENERAL: This is a well-developed 74 y.o. female who appears her stated age  EYES: Grossly normal.  MOUTH: Buccal mucosa normal   MUSCULOSKELETAL: Grossly normal and both lower extremities are intact.  HEME/LYMPH: No lymphedema  NEUROLOGIC: Focally intact, Alert and oriented x 3.   PSYCH: appropriate affect  INTEGUMENT: No open lesions or ulcers  Pulse Exam:   Strong left radial pulse.  Excellent thrill in upper arm over basilic vein fistula.      DIAGNOSTIC STUDIES:     Images:  No results found.    LABS:      Sodium   Date Value Ref Range Status   07/08/2019 137 136 - 145 mmol/L Final   04/05/2019 139 136 - 145 mmol/L Final   04/02/2019 136 136 - 145 mmol/L Final     Potassium   Date Value Ref Range Status   07/08/2019 5.0 3.5 - 5.0 mmol/L Final   04/05/2019 4.5 3.5 - 5.0 mmol/L Final   04/02/2019 6.0 (H) 3.5 - 5.0 mmol/L Final     Chloride   Date Value Ref Range Status   07/08/2019 103 98 - 107 mmol/L Final   04/05/2019 102 98  - 107 mmol/L Final   04/02/2019 109 (H) 98 - 107 mmol/L Final     BUN   Date Value Ref Range Status   07/08/2019 72 (H) 8 - 28 mg/dL Final   04/05/2019 25 8 - 28 mg/dL Final   04/02/2019 68 (H) 8 - 28 mg/dL Final     Creatinine   Date Value Ref Range Status   07/30/2019 9.14 (HH) 0.60 - 1.10 mg/dL Final   07/08/2019 6.52 (HH) 0.60 - 1.10 mg/dL Final   04/05/2019 4.65 (H) 0.60 - 1.10 mg/dL Final     Hemoglobin   Date Value Ref Range Status   07/30/2019 12.2 12.0 - 16.0 g/dL Final   04/02/2019 10.2 (L) 12.0 - 16.0 g/dL Final   02/27/2019 8.3 (L) 12.0 - 16.0 g/dL Final     Platelets   Date Value Ref Range Status   07/30/2019 261 140 - 440 thou/uL Final   04/02/2019 199 140 - 440 thou/uL Final   02/27/2019 126 (L) 140 - 440 thou/uL Final     INR   Date Value Ref Range Status   02/18/2019 1.24 (H) 0.90 - 1.10 Final   02/15/2019 1.22 (H) 0.90 - 1.10 Final       Total time spent 25 minutes face to face with patient with more than 50% time spent in counseling and coordination of care.    Sheri Hooper MD  VASCULAR SURGERY

## 2021-06-01 NOTE — ANESTHESIA POSTPROCEDURE EVALUATION
Patient: Kevin Ellis  Transposition of basilic vein fistula - Left  Anesthesia type: regional    Patient location: PACU  Last vitals:   Vitals Value Taken Time   /55 9/10/2019  7:48 AM   Temp 37.2  C (98.9  F) 9/10/2019  7:48 AM   Pulse 79 9/10/2019  9:21 AM   Resp 16 9/10/2019  7:48 AM   SpO2 93 % 9/10/2019  7:48 AM   Vitals shown include unvalidated device data.  Post vital signs: stable  Level of consciousness: awake and responds to simple questions  Post-anesthesia pain: pain controlled  Post-anesthesia nausea and vomiting: no  Pulmonary: unassisted, return to baseline  Cardiovascular: stable and blood pressure at baseline  Hydration: adequate  Anesthetic events: no    QCDR Measures:  ASA# 11 - Rakel-op Cardiac Arrest: ASA11B - Patient did NOT experience unanticipated cardiac arrest  ASA# 12 - Rakel-op Mortality Rate: ASA12B - Patient did NOT die  ASA# 13 - PACU Re-Intubation Rate: ASA13B - Patient did NOT require a new airway mgmt  ASA# 10 - Composite Anes Safety: ASA10A - No serious adverse event    Additional Notes:

## 2021-06-01 NOTE — PROGRESS NOTES
"  Office Visit - Follow Up   Kevin Ellis   74 y.o. female    Date of Visit: 9/17/2019    Chief Complaint   Patient presents with     Test Results     ENDO results        Assessment and Plan   1. A-V fistula (H)  2. Hemodialysis patient (H)  3. ESRD (end stage renal disease) (H)  Continues to do well.  Incision healing.  Pain controlled.  Has dialysis catheter.    4. Gastroesophageal reflux disease with esophagitis  Discussed use of Zantac to help with gastritis  - ranitidine (ZANTAC) 150 MG tablet; Take 1 tablet (150 mg total) by mouth at bedtime.  Dispense: 30 tablet; Refill: 11    Return in about 3 months (around 12/17/2019) for recheck.     History of Present Illness   This 74 y.o. old woman comes in for follow-up.  Doing well after procedure for her AV fistula.  Some tenderness which is improving.  No longer using opiate pain medication.  We reviewed her endoscopy.  No H. pylori.  No celiac disease.  She is not using ranitidine.  She has had occasional nausea and epigastric abdominal pain.  Gastritis seen on upper endoscopy and pathology.    Review of Systems: A comprehensive review of systems was negative except as noted.     Medications, Allergies and Problem List   Reviewed, reconciled and updated  Post Discharge Medication Reconciliation Status: discharge medications reconciled and changed, per note/orders (see AVS)     Physical Exam   General Appearance:   No acute distress    /56 (Patient Site: Right Arm, Patient Position: Sitting, Cuff Size: Adult Regular)   Pulse 79   Ht 5' 2\" (1.575 m)   Wt 134 lb (60.8 kg)   LMP 05/05/1985   SpO2 98%   BMI 24.51 kg/m      HEENT exam is unremarkable  Neck supple no thyromegaly or nodule palpable  Lymphatic no cervical lymphadenopathy  Cardiovascular regular rate and rhythm no murmur gallop or rub, her AV fistula appears to be healing well, thrill palpated  Pulmonary lungs are clear to auscultation bilaterally  Gastrointestinal abdomen " soft nontender nondistended no organomegaly  Neurologic exam is non focal  Psychiatric pleasant, no confusion or agitation        Additional Information   Current Outpatient Medications   Medication Sig Dispense Refill     calcitriol (ROCALTROL) 0.5 MCG capsule Take 0.5 mcg by mouth. Mon, Wed, Fri at dialysis       ranitidine (ZANTAC) 150 MG tablet Take 1 tablet (150 mg total) by mouth at bedtime. 30 tablet 11     No current facility-administered medications for this visit.      Allergies   Allergen Reactions     Ibuprofen      Social History     Tobacco Use     Smoking status: Never Smoker     Smokeless tobacco: Never Used   Substance Use Topics     Alcohol use: No     Drug use: No       Review and/or order of clinical lab tests:  Review and/or order of radiology tests:  Review and/or order of medicine tests:  Discussion of test results with performing physician:  Decision to obtain old records and/or obtain history from someone other than the patient:  Review and summarization of old records and/or obtaining history from someone other than the patient and.or discussion of case with another health care provider:  Independent visualization of image, tracing or specimen itself:    Time:      Celso Maurice MD

## 2021-06-01 NOTE — TELEPHONE ENCOUNTER
Writer called son to see how pt was doing post Left arm basilic fistula stage 2 on 9/10/19. He stated that she is going good and resting. He did stated that the f/u with Dr. Hooper was cancelled and not sure why. Writer reviewed appt. And it was not cancelled by someone in the vascular center. Appt was rescheduled with Dr. Hooper for 9/26/19.

## 2021-06-01 NOTE — PROGRESS NOTES
2wk post surgery- Left Transposition of basilic vein fistula on 9/9/19. Got US done on 9/24/19.     Sent message ok to use fistula to [facility]. Fax#[phone]

## 2021-06-01 NOTE — PATIENT INSTRUCTIONS - HE
It is ok to start using your fistula for dialysis. Please call the clinic if there are any issues using the fistula.         Arterio-Venous Fistula Instructions    Your fistula was surgically created by joining an artery and a vein together in your arm. After surgery the vein needs time to enlarge and mature so that the needles can be easily inserted for dialysis. It is essential that you exercise your arm to help the vein enlarge by opening and closing your fist.     Use your arm for all of your usual activities. After seven days from the date of your surgery apply a tourniquet above your elbow tight enough to make the veins distend but not so tight that your hand goes to sleep or becomes extremely uncomfortable. Leave the tourniquet on for twenty to thirty minutes at a time, four times a day. Keep doing this until the fistula is used for dialysis.      The sutures are under the skin and will dissolve. You may shower twenty-four hours after surgery. Do not lie or sleep on your fistula.  If you notice any signs of infection (redness, swelling drainage) call us.     If you cannot feel a pulse or buzzing in your fistula, or if you have any signs of infection or questions, please contact [facility] at [phone].

## 2021-06-01 NOTE — PROGRESS NOTES
Preoperative Consultation   Kevin Ellis   74 y.o.  female    Date of visit: 9/6/2019  Physician: Celso Maurice MD    This is a preoperative consultation requested by Dr. Suresh in preparation for AV fistula surgery on 9/10/2019 at [facility], fax [phone].       Assessment and Plan   Kevin Ellis was seen in preoperative consultation in preparation for AV fistula surgery.  This is a low risk surgery and the patient has increased risk for major cardiac complications based on a history of creatinine >2mg/dl.  No contraindication to proposed surgery.    Unrelated to her preoperative exam, she has had positive testing now for celiac disease in the context of severe osteoporosis.  She underwent endoscopy yesterday per my understanding and we have requested these results.  Per her description she may also have H. Pylori    1. Pre-operative examination    2. Seropositive rheumatoid arthritis of multiple sites (H)    3. Anemia of chronic renal failure, stage 5 (H)    4. ESRD (end stage renal disease) on dialysis (H)    5. Secondary renal hyperparathyroidism (H)    6. Compression fracture of body of thoracic vertebra (H)         Patient Profile   Social History     Social History Narrative    Lives with her sons, Timoteo and Leonel.          Past Medical History   Patient Active Problem List   Diagnosis     Seropositive rheumatoid arthritis of multiple sites (H)     High risk medication use     Anemia of chronic renal failure     Cyclic citrullinated peptide (CCP) antibody positive     ESRD (end stage renal disease) on dialysis (H)     Secondary renal hyperparathyroidism (H)     Compression fracture of body of thoracic vertebra (H)     Ovarian cyst follow-up May 2020       Past Surgical History  She has a past surgical history that includes IR Tunneled Catheter Insert (2/15/2019); AV fistula placement (Left, 2/21/2019); Total hip arthroplasty (Left); AV fistula  placement (Left, 4/5/2019); and US Breast Cyst Aspiration Right (Right, 5/3/2019).     History of Present Illness   This 74 y.o. old woman comes in for preoperative evaluation.  She is going to have additional surgery on her AV fistula.  She feels well.  Recent upper endoscopy yesterday and I am still waiting for this report.  No chest pain or shortness of breath.  She stopped taking prednisone.  She stopped taking all her other medications as well and states she actually feels fairly well.  I had put her on an H2 blocker which is really helped with her GI symptoms and could be explained by possible H. pylori seen on endoscopy.  I think she is still taking this medication.    Recent antiplatelet use: no  Personal or family history of bleeding or clotting disorders: no  Steroid use in the past year: yes 2.5 mg daily she is no longer taking  Personal or family history of difficulty with anesthesia: no  Current cardiopulmonary symptoms: no    Review of Systems: A comprehensive review of systems was negative except as noted.     Medications and Allergies   Current Outpatient Medications   Medication Sig Dispense Refill     ranitidine (ZANTAC) 150 MG tablet Take 1 tablet (150 mg total) by mouth at bedtime. 30 tablet 11     No current facility-administered medications for this visit.      Allergies   Allergen Reactions     Ibuprofen         Family and Social History   Family History   Problem Relation Age of Onset     Heart attack Mother      No Medical Problems Father      No Medical Problems Brother      No Medical Problems Sister      No Medical Problems Brother      No Medical Problems Son      No Medical Problems Daughter         Social History     Tobacco Use     Smoking status: Never Smoker     Smokeless tobacco: Never Used   Substance Use Topics     Alcohol use: No     Drug use: No        Physical Exam   General Appearance:   No acute distress    /58 (Patient Site: Right Arm, Patient Position: Sitting, Cuff  "Size: Adult Regular)   Pulse 72   Ht 4' 11.5\" (1.511 m)   Wt 130 lb (59 kg)   LMP 05/05/1985   SpO2 97%   BMI 25.82 kg/m      EYES: Eyelids, conjunctiva, and sclera were normal. Pupils were normal. Cornea, iris, and lens were normal bilaterally.  HEAD, EARS, NOSE, MOUTH, AND THROAT: Head and face were normal. Hearing was normal to voice and the ears were normal to external exam. Nose appearance was normal and there was no discharge. Oropharynx was normal.  NECK: Neck appearance was normal. There were no neck masses and the thyroid was not enlarged.  RESPIRATORY: Breathing pattern was normal and the chest moved symmetrically.  Percussion/auscultatory percussion was normal.  Lung sounds were normal and there were no abnormal sounds.  CARDIOVASCULAR: Heart rate and rhythm were normal.  S1 and S2 were normal and there were no extra sounds or murmurs. Peripheral pulses in arms and legs were normal.  Thrombosed AV fistula in the left arm, dialysis catheter in the right chest wall.  Jugular venous pressure was normal.  There was no peripheral edema.  GASTROINTESTINAL: The abdomen was normal in contour.  Bowel sounds were present.  Percussion detected no organ enlargement or tenderness.  Palpation detected no tenderness, mass, or enlarged organs.   MUSCULOSKELETAL: Skeletal configuration was normal and muscle mass was normal for age. Joint appearance was overall normal.  LYMPHATIC: There were no enlarged nodes.  SKIN/HAIR/NAILS: Skin color was normal.  There were no skin lesions.  Hair and nails were normal.  NEUROLOGIC: The patient was alert and oriented to person, place, time, and circumstance. Speech was normal. Cranial nerves were normal. Motor strength was normal for age. The patient was normally coordinated.  Hyperreflexia bilateral upper and lower extremities with 3 beats of clonus in her ankles.  PSYCHIATRIC:  Mood and affect were normal and the patient had normal recent and remote memory. The patient's " judgment and insight were normal.       Additional Tests   Laboratory: Reviewed    Radiology: Reviewed    Electrocardiogram: Sinus rhythm, personally reviewed    Total time spent with the patient today was 40 minutes of which > 50% was spent in counseling and coordination of care     Celso Maurice MD  Internal Medicine  Contact me at [phone]

## 2021-06-01 NOTE — ANESTHESIA PROCEDURE NOTES
Peripheral Block    Patient location during procedure: pre-op  Start time: 9/9/2019 11:40 AM  End time: 9/9/2019 11:52 AM  surgical anesthesia  Staffing:  Performing  Anesthesiologist: Eliz Min MD  Preanesthetic Checklist  Completed: patient identified, site marked, risks, benefits, and alternatives discussed, timeout performed, consent obtained, at patient's request, airway assessed, oxygen available, suction available, emergency drugs available and hand hygiene performed  Peripheral Block  Block type: brachial plexus, supraclavicular  Prep: ChloraPrep  Patient position: supine  Patient monitoring: continuous pulse oximetry, cardiac monitor, heart rate and blood pressure  Laterality: left  Injection technique: ultrasound guided    Ultrasound used to visualize needle placement in proximity to nerve being blocked: yes   Permanent ultrasound image captured for medical record  Sterile gel and probe cover used for ultrasound.    Needle  Needle type: short-bevel   Needle gauge: 20G  Needle length: 4 in  no peripheral nerve catheter placed  Assessment  Injection assessment: no difficulty with injection, negative aspiration for heme, no paresthesia on injection and incremental injection  Additional Notes  Facile block, patient tolerated well and without complication.

## 2021-06-01 NOTE — ANESTHESIA CARE TRANSFER NOTE
Last vitals:   Vitals:    09/09/19 1622   BP: (!) 87/62   Pulse: 80   Resp: 14   Temp:    SpO2: 98%     Patient's level of consciousness is drowsy  Spontaneous respirations: yes  Maintains airway independently: yes  Dentition unchanged: yes  Oropharynx: oropharynx clear of all foreign objects    QCDR Measures:  ASA# 20 - Surgical Safety Checklist: WHO surgical safety checklist completed prior to induction    PQRS# 430 - Adult PONV Prevention: 4558F - Pt received => 2 anti-emetic agents (different classes) preop & intraop  ASA# 8 - Peds PONV Prevention: NA - Not pediatric patient, not GA or 2 or more risk factors NOT present  PQRS# 424 - Rakel-op Temp Management: 4559F - At least one body temp DOCUMENTED => 35.5C or 95.9F within required timeframe  PQRS# 426 - PACU Transfer Protocol: - Transfer of care checklist used  ASA# 14 - Acute Post-op Pain: ASA14B - Patient did NOT experience pain >= 7 out of 10

## 2021-06-01 NOTE — PROGRESS NOTES
Second attempt to reach patient through , Yolanda, ID# [id number], for hospital discharge follow up, no answer.  VM message left reminding pt of their appt 9/17/19 at 3:20pm.  RN has made two unsuccessful attempts to reach patient.  Encounter closed.    Ashleigh Phoenix RN Care Manager, Population Health

## 2021-06-01 NOTE — PROGRESS NOTES
present    here to discuss surgery for basilic vein fistula for dialysis. At last office visit, referred to [facility] for peritoneal dialysis. After meeting with Dr. Gaming, she does not want to proceed with that and would prefer to have Dr. Hooper finish the fistula for dialysis. She is currently doing MWF dialysis at [facility] [place].         Patient had an endoscopy today. Due to poor appetite and diarrhea, weight loss, and nausea.

## 2021-06-01 NOTE — PROGRESS NOTES
Patient now 2 weeks status post transposition of her left arm basilic vein.  Pain well controlled and not needing medications.  Feels good.  Dialysis has not yet tried using the fistula.    Vitals:    09/26/19 1234   BP: 104/50   Pulse: 84   Resp: 18   Temp: 97.8  F (36.6  C)     Left arm incision healing nicely.  Excellent thrill in fistula.  Easily palpable and superficial.    Duplex shows excellent flow volumes and wide caliber fistula.    Impression and plan: Transposed basilic vein fistula that appears to be ready for attempted access.  We will recommend her dialysis center try at cautiously starting tomorrow.  Once she has progressed to both her needles at full caliber in the fistula they can see about having her PermCath removed through their interventional radiology team.  I will see her if there are any problems with using the fistula on an as-needed basis.

## 2021-06-02 VITALS — HEIGHT: 60 IN | BODY MASS INDEX: 27.68 KG/M2 | WEIGHT: 141 LBS

## 2021-06-02 VITALS — WEIGHT: 142 LBS | HEIGHT: 60 IN | BODY MASS INDEX: 27.88 KG/M2

## 2021-06-02 VITALS — WEIGHT: 138.4 LBS | HEIGHT: 61 IN | BODY MASS INDEX: 26.13 KG/M2

## 2021-06-02 VITALS — WEIGHT: 147 LBS | BODY MASS INDEX: 28.71 KG/M2

## 2021-06-02 VITALS — BODY MASS INDEX: 26.74 KG/M2 | HEIGHT: 60 IN | WEIGHT: 136.19 LBS

## 2021-06-02 VITALS — WEIGHT: 142 LBS | BODY MASS INDEX: 27.88 KG/M2 | HEIGHT: 60 IN

## 2021-06-02 VITALS — WEIGHT: 139.25 LBS | HEIGHT: 60 IN | BODY MASS INDEX: 27.34 KG/M2

## 2021-06-02 NOTE — PROGRESS NOTES
Patient with left arm basilic vein transposition placed in September by me.  Still has PermCath in place.  Single needle access to fistula has been performed several times but my understanding is that they are having difficulty getting to access into the fistula.  Speaking to the dialysis center, curvature of the fistula makes it difficult to avoid backwalling the needle. patient not having any issues otherwise.  Wounds are well-healed.    Vitals:    10/31/19 1458   BP: 112/58   Pulse: 76   Resp: 16   Temp: 98  F (36.7  C)     Excellent thrill in fistula.  Small amount of bruising over it.    Duplex: Widely patent fistula with excellent flow volumes.  Fistula does not appear to be too far or removed from the skin.  There are no pseudoaneurysms or other injuries.  There is no intraluminal thrombus.    Impression and plan: Somewhat challenging for short axis due to its path curvature which was required given the length of available fistula vein.  Otherwise good fistula.  Spoke to dialysis center and they will try to get expertise fistula cannulation when they see her next.  No real options to revise at this point.  I will see her if they continue to have trouble however.  PermCath is to be removed as soon as were able to stably access the fistula on a regular basis.

## 2021-06-02 NOTE — PATIENT INSTRUCTIONS - HE
Your ultrasound of the fistula looks normal. We spoke to the nurses at your dialysis and want them to take their time to try again. Please let us know if there continues to be a problem.

## 2021-06-03 VITALS
HEIGHT: 62 IN | HEART RATE: 79 BPM | OXYGEN SATURATION: 98 % | BODY MASS INDEX: 24.66 KG/M2 | SYSTOLIC BLOOD PRESSURE: 110 MMHG | WEIGHT: 134 LBS | DIASTOLIC BLOOD PRESSURE: 56 MMHG

## 2021-06-03 VITALS — HEIGHT: 60 IN | BODY MASS INDEX: 27.48 KG/M2 | WEIGHT: 140 LBS

## 2021-06-03 VITALS
WEIGHT: 130 LBS | TEMPERATURE: 97.8 F | DIASTOLIC BLOOD PRESSURE: 58 MMHG | HEART RATE: 80 BPM | RESPIRATION RATE: 18 BRPM | SYSTOLIC BLOOD PRESSURE: 90 MMHG | BODY MASS INDEX: 25.82 KG/M2

## 2021-06-03 VITALS — BODY MASS INDEX: 28.3 KG/M2 | WEIGHT: 133 LBS

## 2021-06-03 VITALS — BODY MASS INDEX: 26.95 KG/M2 | WEIGHT: 138 LBS

## 2021-06-03 VITALS
HEIGHT: 60 IN | BODY MASS INDEX: 25.52 KG/M2 | HEART RATE: 72 BPM | OXYGEN SATURATION: 97 % | SYSTOLIC BLOOD PRESSURE: 112 MMHG | DIASTOLIC BLOOD PRESSURE: 58 MMHG | WEIGHT: 130 LBS

## 2021-06-03 VITALS — HEIGHT: 62 IN | BODY MASS INDEX: 24.4 KG/M2 | WEIGHT: 132.6 LBS

## 2021-06-03 VITALS — WEIGHT: 133.1 LBS | HEIGHT: 57 IN | BODY MASS INDEX: 28.71 KG/M2

## 2021-06-03 VITALS — BODY MASS INDEX: 25.52 KG/M2 | WEIGHT: 130 LBS | HEIGHT: 60 IN

## 2021-06-03 VITALS — BODY MASS INDEX: 26.52 KG/M2 | WEIGHT: 135.1 LBS | HEIGHT: 60 IN

## 2021-06-03 VITALS — WEIGHT: 141.2 LBS | BODY MASS INDEX: 27.58 KG/M2

## 2021-06-03 VITALS — WEIGHT: 133.5 LBS | HEIGHT: 57 IN | BODY MASS INDEX: 28.8 KG/M2

## 2021-06-03 VITALS — HEIGHT: 60 IN | WEIGHT: 138 LBS | BODY MASS INDEX: 27.09 KG/M2

## 2021-06-03 VITALS — BODY MASS INDEX: 28.86 KG/M2 | WEIGHT: 135.6 LBS

## 2021-06-03 NOTE — TELEPHONE ENCOUNTER
[facility] lab calling to report a critical lab- creatinine 8.09. Pt is on dialysis, this level is to be expected.

## 2021-06-03 NOTE — PROGRESS NOTES
ASSESSMENT AND PLAN:  Kevin Ellis 74 y.o. female is here for follow-up of severe seropositive rheumatoid arthritis in the background of end-stage renal disease on hemodialysis.  She has had excellent response to rituximab infusion.  This was done on 7/12 7/26/2019.  She has had a recurrence of pain such as in her left wrist where she has synovitis, this is the same side where she has a fistula for hemodialysis.  She has right medial epicondylitis.  Management principles were reviewed.  Had she not been for her AV fistula on the left side one would consider corticosteroid injection.  Prednisone 7-1/2 mg prescribed for the next 30 days and then 5 mg.  We will meet here in 2 months.  She may be able to get rituximab going beginning of February next year.  She is the option of right medial epicondyles corticosteroid injection.        Diagnoses and all orders for this visit:    Seropositive rheumatoid arthritis of multiple sites (H)  -     predniSONE (DELTASONE) 2.5 MG tablet; 7.5 mg/d prednisone PO for 1 month, reduce to 5 mg/d for the next month, and then reduce to 2.5 mg/d for the third month and then stop..  Dispense: 90 tablet; Refill: 2    Cyclic citrullinated peptide (CCP) antibody positive    ESRD (end stage renal disease) on dialysis (H)    Hemodialysis patient (H)      HISTORY OF PRESENTING ILLNESS:  Kevin Ellis, 74 y.o., female is here for follow-up.  She has seropositive severe rheumatoid arthritis in the background of severe renal impairment, on replacement therapy, instead of 3 times per week she has decided to take the dialysis 2 times a week.  She has had good response to rituximab infusion.  She has noted pain in her wrist on the left side, right elbow.  The right elbow pain is on the medial epicondyle area.  She rates this is moderately severe to severe.  The left wrist noted to be moderately severe.  She feels her family is to help her day to day activities.   She noted stiffness in the morning  3 minutes.   There is no fever or weight loss she has not had mouth ulcers no cough no rash. She has noted no family or personal history of psoriasis.  She is accompanied by her  and the family.   Further historical information and ADL limitations as noted in the multidimensional health assessment questionnaire attached in the EMR.  .     ALLERGIES:Ibuprofen    PAST MEDICAL/ACTIVE PROBLEMS/MEDICATION/ FAMILY HISTORY/SOCIAL DATA:  The patient has a family history of  Past Medical History:   Diagnosis Date     Anemia of chronic renal failure      Arthritis      Celiac disease      Chronic kidney disease      Compression fracture of body of thoracic vertebra (H)      Coronary artery disease      Cyclic citrullinated peptide (CCP) antibody positive      Dialysis patient (H)      Diarrhea      ESRD (end stage renal disease) (H)      Hypertension      Inverted nipple      Myocardial infarction (H)      Osteoporosis     severe     Ovarian cyst      Secondary renal hyperparathyroidism (H)      Seropositive rheumatoid arthritis (H)      Shortness of breath      Social History     Tobacco Use   Smoking Status Never Smoker   Smokeless Tobacco Never Used     Patient Active Problem List   Diagnosis     Seropositive rheumatoid arthritis of multiple sites (H)     High risk medication use     Anemia of chronic renal failure     Cyclic citrullinated peptide (CCP) antibody positive     ESRD (end stage renal disease) on dialysis (H)     Secondary renal hyperparathyroidism (H)     Compression fracture of body of thoracic vertebra (H)     Ovarian cyst follow-up May 2020     A-V fistula (H)     Hemodialysis patient (H)     Current Outpatient Medications   Medication Sig Dispense Refill     diclofenac sodium (VOLTAREN) 1 % Gel Apply topically 4 (four) times a day.       Lactobacillus acidophilus (PROBIOTIC ORAL) Take by mouth.       loperamide HCl (ANTI-DIARRHEA ORAL) Take by mouth.        "acetaminophen (TYLENOL) 500 MG tablet 1-2 tablets every 4-6 hours as needed for pain. No more than 8 per 24 hour period.       No current facility-administered medications for this visit.      DETAILED EXAMINATION  12/03/19  :  Vitals:    12/03/19 1444   BP: 102/62   Patient Site: Right Arm   Patient Position: Sitting   Cuff Size: Adult Regular   Pulse: 80   Weight: 134 lb 4.8 oz (60.9 kg)   Height: 5' 2\" (1.575 m)     Alert oriented. Head including the face is examined for malar rash, heliotropes, scarring, lupus pernio. Eyes examined for redness such as in episcleritis/scleritis, periorbital lesions.   Neck/ Face examined for parotid gland swelling, range of motion of neck.  Left upper and lower and right upper and lower extremities examined for tenderness, swelling, warmth of the appendicular joints, range of motion, edema, rash.  Some of the important findings included: Flexion deformity of the right elbow about 5 degrees.  She has mild tenderness, warmth and subtle swelling of the left wrist.  She has tenderness in the right medial epicondyles.  LAB / IMAGING DATA:  ALT   Date Value Ref Range Status   11/29/2019 14 0 - 45 U/L Final   07/30/2019 10 0 - 45 U/L Final   02/20/2019 <9 0 - 45 U/L Final     Albumin   Date Value Ref Range Status   11/29/2019 3.2 (L) 3.5 - 5.0 g/dL Final   07/30/2019 3.6 3.5 - 5.0 g/dL Final   02/27/2019 2.4 (L) 3.5 - 5.0 g/dL Final     Creatinine   Date Value Ref Range Status   11/29/2019 8.09 (HH) 0.60 - 1.10 mg/dL Final   09/10/2019 7.11 (HH) 0.60 - 1.10 mg/dL Final   09/09/2019 6.57 (HH) 0.60 - 1.10 mg/dL Final       WBC   Date Value Ref Range Status   11/29/2019 7.6 4.0 - 11.0 thou/uL Final   09/10/2019 10.4 4.0 - 11.0 thou/uL Final     Hemoglobin   Date Value Ref Range Status   11/29/2019 12.1 12.0 - 16.0 g/dL Final   09/10/2019 9.1 (L) 12.0 - 16.0 g/dL Final   09/09/2019 11.4 (L) 12.0 - 16.0 g/dL Final     Platelets   Date Value Ref Range Status   11/29/2019 221 140 - 440 thou/uL " Final   09/10/2019 258 140 - 440 thou/uL Final   09/09/2019 265 140 - 440 thou/uL Final       Lab Results   Component Value Date    RF 61.3 (H) 07/07/2017    SEDRATE 82 (H) 09/05/2017

## 2021-06-04 VITALS — BODY MASS INDEX: 25.76 KG/M2 | WEIGHT: 140 LBS | HEIGHT: 62 IN

## 2021-06-04 VITALS
DIASTOLIC BLOOD PRESSURE: 80 MMHG | BODY MASS INDEX: 25.03 KG/M2 | OXYGEN SATURATION: 97 % | HEIGHT: 62 IN | SYSTOLIC BLOOD PRESSURE: 134 MMHG | HEART RATE: 80 BPM | WEIGHT: 136 LBS

## 2021-06-04 VITALS
SYSTOLIC BLOOD PRESSURE: 118 MMHG | HEIGHT: 62 IN | RESPIRATION RATE: 16 BRPM | BODY MASS INDEX: 25.06 KG/M2 | DIASTOLIC BLOOD PRESSURE: 60 MMHG | HEART RATE: 80 BPM | WEIGHT: 136.2 LBS

## 2021-06-04 VITALS
DIASTOLIC BLOOD PRESSURE: 64 MMHG | SYSTOLIC BLOOD PRESSURE: 122 MMHG | HEIGHT: 62 IN | BODY MASS INDEX: 25.4 KG/M2 | WEIGHT: 138 LBS

## 2021-06-04 VITALS
HEART RATE: 75 BPM | SYSTOLIC BLOOD PRESSURE: 128 MMHG | WEIGHT: 140 LBS | DIASTOLIC BLOOD PRESSURE: 62 MMHG | OXYGEN SATURATION: 98 % | BODY MASS INDEX: 25.76 KG/M2 | HEIGHT: 62 IN

## 2021-06-04 VITALS
SYSTOLIC BLOOD PRESSURE: 102 MMHG | HEART RATE: 80 BPM | WEIGHT: 134.3 LBS | DIASTOLIC BLOOD PRESSURE: 62 MMHG | BODY MASS INDEX: 24.71 KG/M2 | HEIGHT: 62 IN

## 2021-06-04 NOTE — PROGRESS NOTES
"  Office Visit - Follow Up   Kevin Ellis   74 y.o. female    Date of Visit: 12/17/2019    Chief Complaint   Patient presents with     GI Problem     Follow up        Assessment and Plan   1. Gastroesophageal reflux disease with esophagitis  - famotidine (PEPCID) 20 MG tablet; Take 1 tablet (20 mg total) by mouth 2 (two) times a day.  Dispense: 180 tablet; Refill: 3    2. Seropositive rheumatoid arthritis of multiple sites (H)  Continue management per rheumatology, is and rituximab.    3. ESRD (end stage renal disease) on dialysis (H)  Continue dialysis    4. Positive autoantibody screening for celiac disease  She had negative biopsy of the duodenum for celiac disease.  Given her ongoing diarrhea and positive antibody testing I think it is possible there is a sampling error or she could have disease in the more distal intestine.  I recommended a months trial of no gluten.    5. Diarrhea, unspecified type  As above and Imodium    Return in about 3 months (around 3/17/2020) for recheck.     History of Present Illness   This 74 y.o. old woman comes in for follow-up.  She comes in with .  Overall she is doing okay.  Hemodialysis catheter has been removed and she is now using her AV fistula.  She wonders if she can stop dialysis.  She does urinate.  She continues to have some acid reflux type symptoms.  She also has ongoing diarrhea which she manages with Imodium.  She has had positive testing for celiac disease negative biopsy.    Review of Systems: A comprehensive review of systems was negative except as noted.     Medications, Allergies and Problem List   Reviewed, reconciled and updated  Post Discharge Medication Reconciliation Status:      Physical Exam   General Appearance:   No acute distress    /80 (Patient Site: Left Arm, Patient Position: Sitting, Cuff Size: Adult Regular)   Pulse 80   Ht 5' 2\" (1.575 m)   Wt 136 lb (61.7 kg)   LMP 05/05/1985   SpO2 97%   BMI " 24.87 kg/m      HEENT exam is unremarkable  Neck supple no thyromegaly or nodule palpable  Lymphatic no cervical lymphadenopathy  Cardiovascular regular rate and rhythm no murmur gallop or rub  Pulmonary lungs are clear to auscultation bilaterally  Gastrointestinal abdomen soft nontender nondistended no organomegaly  Neurologic exam is non focal  Psychiatric pleasant, no confusion or agitation        Additional Information   Current Outpatient Medications   Medication Sig Dispense Refill     acetaminophen (TYLENOL) 500 MG tablet 1-2 tablets every 4-6 hours as needed for pain. No more than 8 per 24 hour period.       diclofenac sodium (VOLTAREN) 1 % Gel Apply topically 4 (four) times a day.       Lactobacillus acidophilus (PROBIOTIC ORAL) Take by mouth.       loperamide HCl (ANTI-DIARRHEA ORAL) Take by mouth.       predniSONE (DELTASONE) 2.5 MG tablet 7.5 mg/d prednisone PO for 1 month, reduce to 5 mg/d for the next month, and then reduce to 2.5 mg/d for the third month and then stop.. 90 tablet 2     famotidine (PEPCID) 20 MG tablet Take 1 tablet (20 mg total) by mouth 2 (two) times a day. 180 tablet 3     No current facility-administered medications for this visit.      Allergies   Allergen Reactions     Ibuprofen      Social History     Tobacco Use     Smoking status: Never Smoker     Smokeless tobacco: Never Used   Substance Use Topics     Alcohol use: No     Drug use: No       Review and/or order of clinical lab tests:  Review and/or order of radiology tests:  Review and/or order of medicine tests:  Discussion of test results with performing physician:  Decision to obtain old records and/or obtain history from someone other than the patient:  Review and summarization of old records and/or obtaining history from someone other than the patient and.or discussion of case with another health care provider:  Independent visualization of image, tracing or specimen itself:    Time:      Celso Maurice MD

## 2021-06-05 VITALS
HEIGHT: 60 IN | WEIGHT: 144 LBS | BODY MASS INDEX: 28.27 KG/M2 | SYSTOLIC BLOOD PRESSURE: 104 MMHG | DIASTOLIC BLOOD PRESSURE: 60 MMHG | OXYGEN SATURATION: 98 % | HEART RATE: 64 BPM

## 2021-06-05 VITALS
OXYGEN SATURATION: 97 % | SYSTOLIC BLOOD PRESSURE: 134 MMHG | DIASTOLIC BLOOD PRESSURE: 70 MMHG | HEART RATE: 80 BPM | WEIGHT: 148.31 LBS | BODY MASS INDEX: 31.13 KG/M2 | HEIGHT: 58 IN

## 2021-06-05 VITALS
SYSTOLIC BLOOD PRESSURE: 110 MMHG | HEART RATE: 78 BPM | BODY MASS INDEX: 27.42 KG/M2 | OXYGEN SATURATION: 100 % | TEMPERATURE: 97.3 F | HEIGHT: 62 IN | WEIGHT: 149 LBS | DIASTOLIC BLOOD PRESSURE: 68 MMHG

## 2021-06-05 VITALS
WEIGHT: 146 LBS | SYSTOLIC BLOOD PRESSURE: 114 MMHG | BODY MASS INDEX: 26.87 KG/M2 | OXYGEN SATURATION: 98 % | HEIGHT: 62 IN | DIASTOLIC BLOOD PRESSURE: 70 MMHG | HEART RATE: 86 BPM

## 2021-06-05 VITALS
WEIGHT: 147.1 LBS | SYSTOLIC BLOOD PRESSURE: 104 MMHG | DIASTOLIC BLOOD PRESSURE: 58 MMHG | BODY MASS INDEX: 28.73 KG/M2 | HEART RATE: 78 BPM

## 2021-06-05 VITALS
SYSTOLIC BLOOD PRESSURE: 110 MMHG | HEIGHT: 60 IN | OXYGEN SATURATION: 99 % | DIASTOLIC BLOOD PRESSURE: 62 MMHG | HEART RATE: 72 BPM | WEIGHT: 147.6 LBS | BODY MASS INDEX: 28.98 KG/M2

## 2021-06-05 VITALS — BODY MASS INDEX: 26.61 KG/M2 | WEIGHT: 144.6 LBS | HEIGHT: 62 IN

## 2021-06-05 NOTE — PROGRESS NOTES
ASSESSMENT AND PLAN:  Kevin Ellis 74 y.o. female is here for follow-up of severe seropositive rheumatoid arthritis in the background of end-stage renal disease on hemodialysis.  She has had excellent response to rituximab infusion.  This was done on 7/12 7/26/2019.  She has had a recurrence of pain such as in her left wrist where she has synovitis, this is the same side where she has a fistula for hemodialysis.  She has right medial epicondylitis.  Management principles were reviewed.  Had she not been for her AV fistula on the left side one would consider corticosteroid injection.  Prednisone 7-1/2 mg prescribed for the next 30 days and then 5 mg.  We will meet here in 2 months.  She may be able to get rituximab going beginning of February next year.  She is the option of right medial epicondyles corticosteroid injection.        Diagnoses and all orders for this visit:    Seropositive rheumatoid arthritis of multiple sites (H)  -     Immunoglobulins IgG, IgA, IgM    High risk medication use  -     Immunoglobulins IgG, IgA, IgM    ESRD (end stage renal disease) on dialysis (H)    Cyclic citrullinated peptide (CCP) antibody positive    Compression fracture of body of thoracic vertebra (H)      HISTORY OF PRESENTING ILLNESS:  Kevin Ellis, 74 y.o., female is here for follow-up.  She has seropositive severe rheumatoid arthritis in the background of end-stage renal failure, on replacement therapy, instead of 3 times per week she has decided to take the dialysis 2 times a week.  She has noted significant improvement in her joint symptoms with a modest dose of prednisone that she is about to finish in the next 2 weeks currently at 5 mg daily.  She has residual pain such as around the right elbow.  This is more likely due to epicondylitis rather than synovitis.  She has noted stiffness in the morning no more than 30 minutes.  She has had good response to rituximab infusion which  was done in July 2019.  She has noted pain in her wrist on the left side, right elbow.  The right elbow pain is on the lateral epicondyle area.  She rates this is moderately severe to severe.    There is no fever she has noted weight loss she has not had mouth ulcers no cough no rash. She has noted no family or personal history of psoriasis.  She is accompanied by her  and the family.   Further historical information and ADL limitations as noted in the multidimensional health assessment questionnaire attached in the EMR.  .     ALLERGIES:Ibuprofen    PAST MEDICAL/ACTIVE PROBLEMS/MEDICATION/ FAMILY HISTORY/SOCIAL DATA:  The patient has a family history of  Past Medical History:   Diagnosis Date     Anemia of chronic renal failure      Arthritis      Celiac disease      Chronic kidney disease      Compression fracture of body of thoracic vertebra (H)      Coronary artery disease      Cyclic citrullinated peptide (CCP) antibody positive      Dialysis patient (H)      Diarrhea      ESRD (end stage renal disease) (H)      Hypertension      Inverted nipple      Myocardial infarction (H)      Osteoporosis     severe     Ovarian cyst      Secondary renal hyperparathyroidism (H)      Seropositive rheumatoid arthritis (H)      Shortness of breath      Social History     Tobacco Use   Smoking Status Never Smoker   Smokeless Tobacco Never Used     Patient Active Problem List   Diagnosis     Seropositive rheumatoid arthritis of multiple sites (H)     High risk medication use     Anemia of chronic renal failure     Cyclic citrullinated peptide (CCP) antibody positive     ESRD (end stage renal disease) on dialysis (H)     Secondary renal hyperparathyroidism (H)     Compression fracture of body of thoracic vertebra (H)     Ovarian cyst follow-up May 2020     Current Outpatient Medications   Medication Sig Dispense Refill     acetaminophen (TYLENOL) 500 MG tablet 1-2 tablets every 4-6 hours as needed for pain. No more  "than 8 per 24 hour period.       diclofenac sodium (VOLTAREN) 1 % Gel Apply topically 4 (four) times a day.       famotidine (PEPCID) 20 MG tablet Take 1 tablet (20 mg total) by mouth 2 (two) times a day. 180 tablet 3     Lactobacillus acidophilus (PROBIOTIC ORAL) Take by mouth.       loperamide HCl (ANTI-DIARRHEA ORAL) Take by mouth.       predniSONE (DELTASONE) 2.5 MG tablet 7.5 mg/d prednisone PO for 1 month, reduce to 5 mg/d for the next month, and then reduce to 2.5 mg/d for the third month and then stop.. 90 tablet 2     No current facility-administered medications for this visit.      DETAILED EXAMINATION  02/04/20  :  Vitals:    02/04/20 1431   BP: 122/64   Patient Site: Right Arm   Patient Position: Sitting   Cuff Size: Adult Regular   Weight: 138 lb (62.6 kg)   Height: 5' 2\" (1.575 m)     Alert oriented. Head including the face is examined for malar rash, heliotropes, scarring, lupus pernio. Eyes examined for redness such as in episcleritis/scleritis, periorbital lesions.   Neck/ Face examined for parotid gland swelling, range of motion of neck.  Left upper and lower and right upper and lower extremities examined for tenderness, swelling, warmth of the appendicular joints, range of motion, edema, rash.  Some of the important findings included: She does not have synovitis and palpable joints of upper extremities, tenderness in the right lateral epicondyles, dialysis access on the left upper extremity, knees without effusion warmth.    LAB / IMAGING DATA:  ALT   Date Value Ref Range Status   11/29/2019 14 0 - 45 U/L Final   07/30/2019 10 0 - 45 U/L Final   02/20/2019 <9 0 - 45 U/L Final     Albumin   Date Value Ref Range Status   11/29/2019 3.2 (L) 3.5 - 5.0 g/dL Final   07/30/2019 3.6 3.5 - 5.0 g/dL Final   02/27/2019 2.4 (L) 3.5 - 5.0 g/dL Final     Creatinine   Date Value Ref Range Status   11/29/2019 8.09 (HH) 0.60 - 1.10 mg/dL Final   09/10/2019 7.11 (HH) 0.60 - 1.10 mg/dL Final   09/09/2019 6.57 () " 0.60 - 1.10 mg/dL Final       WBC   Date Value Ref Range Status   11/29/2019 7.6 4.0 - 11.0 thou/uL Final   09/10/2019 10.4 4.0 - 11.0 thou/uL Final     Hemoglobin   Date Value Ref Range Status   11/29/2019 12.1 12.0 - 16.0 g/dL Final   09/10/2019 9.1 (L) 12.0 - 16.0 g/dL Final   09/09/2019 11.4 (L) 12.0 - 16.0 g/dL Final     Platelets   Date Value Ref Range Status   11/29/2019 221 140 - 440 thou/uL Final   09/10/2019 258 140 - 440 thou/uL Final   09/09/2019 265 140 - 440 thou/uL Final       Lab Results   Component Value Date    RF 61.3 (H) 07/07/2017    SEDRATE 82 (H) 09/05/2017

## 2021-06-06 NOTE — PROGRESS NOTES
"  Office Visit - Follow Up   Kevin Ellis   74 y.o. female    Date of Visit: 3/5/2020    Chief Complaint   Patient presents with     Referral     pain therapy        Assessment and Plan   1. Hip pain, right  We will do an x-ray to evaluate for arthritis also check the implant to the left leg.  Recommended some physical therapy as per staff  - XR Pelvis W 2 Vw Hips Bilateral; Future  - Ambulatory referral to Adult PT- Internal    2. H/O total hip arthroplasty, left  - XR Pelvis W 2 Vw Hips Bilateral; Future    3. Seropositive rheumatoid arthritis of multiple sites (H)  Regimen per rheumatology    4. ESRD (end stage renal disease) on dialysis (H)  Stable continue dialysis per routine    Return in about 3 months (around 6/5/2020) for recheck.     History of Present Illness   This 74 y.o. old woman from [facility] comes in for follow-up.  She is seen with the help of a .  Her main complaint today is right hip pain.  She has a history of total hip arthroplasty in [facility].  She apparently fell and may have had some issues with the left implant previously but no revision was recommended.  Since she fell many years ago she is had worsening onset of right hip and back pain.  Feels very similar to arthritis she had in her left hip prompting replacement.  She does have underlying rheumatoid arthritis and things are a little bit active and she is following with rheumatology.  She also is on dialysis and seems to be tolerating this okay.    Review of Systems: A comprehensive review of systems was negative except as noted.     Medications, Allergies and Problem List   Reviewed, reconciled and updated  Post Discharge Medication Reconciliation Status:      Physical Exam   General Appearance:   No acute distress    /62 (Patient Site: Right Arm, Patient Position: Sitting, Cuff Size: Adult Regular)   Pulse 75   Ht 5' 2\" (1.575 m)   Wt 140 lb (63.5 kg)   LMP 05/05/1985   SpO2 98%   " BMI 25.61 kg/m      HEENT exam is unremarkable  Neck supple no thyromegaly or nodule palpable  Lymphatic no cervical lymphadenopathy  Cardiovascular regular rate and rhythm no murmur gallop or rub  Pulmonary lungs are clear to auscultation bilaterally  Gastrointestinal abdomen soft nontender nondistended no organomegaly  Neurologic exam is non focal  Psychiatric pleasant, no confusion or agitation        Additional Information   Current Outpatient Medications   Medication Sig Dispense Refill     acetaminophen (TYLENOL) 500 MG tablet 1-2 tablets every 4-6 hours as needed for pain. No more than 8 per 24 hour period.       diclofenac sodium (VOLTAREN) 1 % Gel Apply topically 4 (four) times a day.       famotidine (PEPCID) 20 MG tablet Take 1 tablet (20 mg total) by mouth 2 (two) times a day. 180 tablet 3     Lactobacillus acidophilus (PROBIOTIC ORAL) Take by mouth.       lidocaine-prilocaine (EMLA) cream APPLY SMALL AMOUNT TO ACCESS SITE (AVF) 1 TO 2 HOURS BEFORE DIALYSIS. COVER WITH OCCLUSIVE DRESSING (SARAN WRAP)       loperamide HCl (ANTI-DIARRHEA ORAL) Take by mouth.       sevelamer HCL (RENAGEL) 800 MG tablet TAKE 1 TABLET BY MOUTH THREE TIMES A DAY WITH MEALS AND 1 TABLET TWICE A DAY WITH SNACKS       No current facility-administered medications for this visit.      Allergies   Allergen Reactions     Ibuprofen      Social History     Tobacco Use     Smoking status: Never Smoker     Smokeless tobacco: Never Used   Substance Use Topics     Alcohol use: No     Drug use: No       Review and/or order of clinical lab tests:  Review and/or order of radiology tests:  Review and/or order of medicine tests:  Discussion of test results with performing physician:  Decision to obtain old records and/or obtain history from someone other than the patient:  Review and summarization of old records and/or obtaining history from someone other than the patient and.or discussion of case with another health care provider:  Independent  visualization of image, tracing or specimen itself:    Time:      Celso Maurice MD

## 2021-06-06 NOTE — PATIENT INSTRUCTIONS - HE
[place],    It was a pleasure to see you today at [facility].    My nurse or I will call the  service with the newly ordered test results.    Please call us if you have any questions or concerns about your heart.      Louis Hinson M.D.

## 2021-06-07 NOTE — TELEPHONE ENCOUNTER
Called patient with aid of language services as a status check in. Pt reports no questions or concerns at this time; continuing with HEP regularly. Discussed will cancel appt at end of April per current scheduling restrictions.    Joesph Mullins, PT, DPT

## 2021-06-07 NOTE — PROGRESS NOTES
"Kevin Ellis is a 74 y.o. female who is being evaluated via a billable telephone visit.      The patient has been notified of following:     \"This telephone visit will be conducted via a call between you and your physician/provider. We have found that certain health care needs can be provided without the need for a physical exam.  This service lets us provide the care you need with a short phone conversation.  If a prescription is necessary we can send it directly to your pharmacy.  If lab work is needed we can place an order for that and you can then stop by our lab to have the test done at a later time.    Telephone visits are billed at different rates depending on your insurance coverage. During this emergency period, for some insurers they may be billed the same as an in-person visit.  Please reach out to your insurance provider with any questions.    If during the course of the call the physician/provider feels a telephone visit is not appropriate, you will not be charged for this service.\"    Patient has given verbal consent to a Telephone visit? Yes    Patient would like to receive their AVS by AVS Preference: Samantha.    Additional provider notes:     Venus Blancas CMA      ASSESSMENT AND PLAN:    Diagnoses and all orders for this visit:    Seropositive rheumatoid arthritis of multiple sites (H)  -     predniSONE (DELTASONE) 2.5 MG tablet; 7.5 mg/d prednisone PO for 1 month.  Dispense: 90 tablet; Refill: o    Cyclic citrullinated peptide (CCP) antibody positive    ESRD (end stage renal disease) on dialysis (H)          HISTORY OF PRESENTING ILLNESS:  Kevin Ellis 74 y.o. is evaluated here via phone  link.  This patient has rheumatoid arthritis, she has end-stage renal disease, on hemodialysis, left upper extremity fistula.  She is on line with .  She reports severe pain.  This is in her right arm.  She feels this is in the shoulder, her right " elbow area.  In the past she has had a right elbow injection with good benefit.  She wondered if that can be repeated.  We discussed the current limitations.  She is going to take on for a short course.  Major side effects outlined.  I have asked her to give me a call here in 2 to 3 weeks.  We will meet here in 2 months or sooner.  Meanwhile she is to continue her current regimen.  This includes rituximab which she gets on as-needed basis.  As she has tried a variety of DMARDs Biologics in the past as discussed in the previous office notes.  Today we also discussed the issues related to the current pandemic, the pros and cons of the current treatment plan, the CDC guidelines such as social distancing washing the hands covering the cough.  She is aware not to take nonsteroidals.  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     Hypertension      Inverted nipple      Osteoporosis     severe     Ovarian cyst      Secondary renal hyperparathyroidism (H)      Seropositive rheumatoid arthritis (H)      Social History     Tobacco Use   Smoking Status Never Smoker   Smokeless Tobacco Never Used     Patient Active Problem List   Diagnosis     Seropositive rheumatoid arthritis of multiple sites (H)     High risk medication use     Anemia of chronic renal failure     Cyclic citrullinated peptide (CCP) antibody positive     ESRD (end stage renal disease) on dialysis (H)     Secondary renal hyperparathyroidism (H)     Ovarian cyst follow-up May 2020     Current Outpatient Medications   Medication Sig Dispense Refill      acetaminophen (TYLENOL) 500 MG tablet 1-2 tablets every 4-6 hours as needed for pain. No more than 8 per 24 hour period.       diclofenac sodium (VOLTAREN) 1 % Gel Apply topically 4 (four) times a day.       famotidine (PEPCID) 20 MG tablet Take 1 tablet (20 mg total) by mouth 2 (two) times a day. 180 tablet 3     Lactobacillus acidophilus (PROBIOTIC ORAL) Take by mouth.       lidocaine-prilocaine (EMLA) cream APPLY SMALL AMOUNT TO ACCESS SITE (AVF) 1 TO 2 HOURS BEFORE DIALYSIS. COVER WITH OCCLUSIVE DRESSING (SARAN WRAP)       loperamide HCl (ANTI-DIARRHEA ORAL) Take by mouth.       sevelamer HCL (RENAGEL) 800 MG tablet TAKE 1 TABLET BY MOUTH THREE TIMES A DAY WITH MEALS AND 1 TABLET TWICE A DAY WITH SNACKS       No current facility-administered medications for this visit.          EXAMINATION:  Phone visit      LAB / IMAGING DATA:  ALT   Date Value Ref Range Status   11/29/2019 14 0 - 45 U/L Final   07/30/2019 10 0 - 45 U/L Final   02/20/2019 <9 0 - 45 U/L Final     Albumin   Date Value Ref Range Status   11/29/2019 3.2 (L) 3.5 - 5.0 g/dL Final   07/30/2019 3.6 3.5 - 5.0 g/dL Final   02/27/2019 2.4 (L) 3.5 - 5.0 g/dL Final     Creatinine   Date Value Ref Range Status   11/29/2019 8.09 (HH) 0.60 - 1.10 mg/dL Final   09/10/2019 7.11 (HH) 0.60 - 1.10 mg/dL Final   09/09/2019 6.57 (HH) 0.60 - 1.10 mg/dL Final       WBC   Date Value Ref Range Status   11/29/2019 7.6 4.0 - 11.0 thou/uL Final   09/10/2019 10.4 4.0 - 11.0 thou/uL Final     Hemoglobin   Date Value Ref Range Status   11/29/2019 12.1 12.0 - 16.0 g/dL Final   09/10/2019 9.1 (L) 12.0 - 16.0 g/dL Final   09/09/2019 11.4 (L) 12.0 - 16.0 g/dL Final     Platelets   Date Value Ref Range Status   11/29/2019 221 140 - 440 thou/uL Final   09/10/2019 258 140 - 440 thou/uL Final   09/09/2019 265 140 - 440 thou/uL Final       Lab Results   Component Value Date    RF 61.3 (H) 07/07/2017    SEDRATE 82 (H) 09/05/2017     Duration of the call:6  Minutes

## 2021-06-08 NOTE — PROGRESS NOTES
[facility] Discharge Summary  Patient Name: Kevin Ellis  Date: 6/18/2020  Referral Diagnosis: Hip pain, right   Referring provider: Celso Maurice MD  Visit Diagnosis:   1. Acute right hip pain     2. Hip stiffness, right     3. Generalized muscle weakness     4. Unsteadiness on feet         Goals: Unable to comment on completion due to lack of further follow-up with PT.  Pt. will be independent with home exercise program in : 6 weeks  Pt. will have improved quality of sleep: waking less times/night;in 6 weeks;Comment  Comment:: and able to lie on the R side without increased pain  Pt. will bend: to dress;with no pain;in 6 weeks  Patient will twist/turn : in bed;with no pain;for bed mobilitiy;in 6 weeks  Patient will sit: Comment  Comment: for dialysis without increased pain in 6 weeks.    Patient was seen for initial evaluation and treatment on 3/12/20 only.   Patient received a home program .  The patient discontinued therapy, did not return.    Therapy will be discontinued at this time.  The patient will need a new referral to resume.    Thank you for your referral.  Joe Mullins  6/18/2020  8:23 AM

## 2021-06-08 NOTE — PROGRESS NOTES
"Kevin Ellis is a 74 y.o. female who is being evaluated via a billable telephone visit.      The patient has been notified of following:     \"This telephone visit will be conducted via a call between you and your physician/provider. We have found that certain health care needs can be provided without the need for a physical exam.  This service lets us provide the care you need with a short phone conversation.  If a prescription is necessary we can send it directly to your pharmacy.  If lab work is needed we can place an order for that and you can then stop by our lab to have the test done at a later time.    Telephone visits are billed at different rates depending on your insurance coverage. During this emergency period, for some insurers they may be billed the same as an in-person visit.  Please reach out to your insurance provider with any questions.    If during the course of the call the physician/provider feels a telephone visit is not appropriate, you will not be charged for this service.\"    Patient has given verbal consent to a Telephone visit? Yes    What phone number would you like to be contacted at? [phone]    Patient would like to receive their AVS by AVS Preference: Samantha.      ASSESSMENT AND PLAN:    Diagnoses and all orders for this visit:    Seropositive rheumatoid arthritis of multiple sites (H)  -     predniSONE (DELTASONE) 2.5 MG tablet; 7.5 mg/d prednisone PO for 1 month.  Dispense: 90 tablet; Refill: 0    ESRD (end stage renal disease) on dialysis (H)    High risk medication use          HISTORY OF PRESENTING ILLNESS:  Kevin Ellis 74 y.o. is evaluated here via phone link.  She has rheumatoid arthritis, background of comorbidities including end-stage renal disease on hemodialysis.  On her previous visit she had noted pain in her right upper extremity shoulder and elbow.  For unclear reason she was unable to get prednisone that was recommended.  She " used Aspercreme.  This is provided her with some relief with residual pain there.  She had wanted corticosteroid injections I have suggested given the current milieu and her on her comorbidities to try low-dose of prednisone.  She is going to  the prescription.  As noted previously she has tried a variety of various DMARDs, Biologics.  Rituximab seems to have helped her the most.  She is to call back here in 2 months or sooner.   ROS enquiry held for fever, ocular symptoms, rash, headache,  GI issues.  Today we also discussed the issues related to the current pandemic, the pros and cons of the current treatment plan, the CDC guidelines such as social distancing washing the hands covering the cough.  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, DO; gets dialysis at [facility]     Hypertension      Inverted nipple      Osteoporosis     severe     Ovarian cyst      Secondary renal hyperparathyroidism (H)      Seropositive rheumatoid arthritis (H)      Social History     Tobacco Use   Smoking Status Never Smoker   Smokeless Tobacco Never Used     Patient Active Problem List   Diagnosis     Seropositive rheumatoid arthritis of multiple sites (H)     High risk medication use     Anemia of chronic renal failure     Cyclic citrullinated peptide (CCP) antibody positive     ESRD (end stage renal disease) on dialysis (H)     Secondary renal hyperparathyroidism (H)     Ovarian cyst follow-up May 2020     Current Outpatient Medications   Medication Sig Dispense Refill     acetaminophen (TYLENOL) 500 MG tablet 1-2 tablets every 4-6 hours as needed for  pain. No more than 8 per 24 hour period.       diclofenac sodium (VOLTAREN) 1 % Gel Apply topically 4 (four) times a day.       famotidine (PEPCID) 20 MG tablet Take 1 tablet (20 mg total) by mouth 2 (two) times a day. 180 tablet 3     Lactobacillus acidophilus (PROBIOTIC ORAL) Take by mouth.       lidocaine-prilocaine (EMLA) cream APPLY SMALL AMOUNT TO ACCESS SITE (AVF) 1 TO 2 HOURS BEFORE DIALYSIS. COVER WITH OCCLUSIVE DRESSING (SARAN WRAP)       loperamide HCl (ANTI-DIARRHEA ORAL) Take by mouth.       predniSONE (DELTASONE) 2.5 MG tablet 7.5 mg/d prednisone PO for 1 month. 90 tablet o     sevelamer HCL (RENAGEL) 800 MG tablet TAKE 1 TABLET BY MOUTH THREE TIMES A DAY WITH MEALS AND 1 TABLET TWICE A DAY WITH SNACKS       No current facility-administered medications for this visit.          EXAMINATION: Phone visit    LAB / IMAGING DATA:  ALT   Date Value Ref Range Status   11/29/2019 14 0 - 45 U/L Final   07/30/2019 10 0 - 45 U/L Final   02/20/2019 <9 0 - 45 U/L Final     Albumin   Date Value Ref Range Status   11/29/2019 3.2 (L) 3.5 - 5.0 g/dL Final   07/30/2019 3.6 3.5 - 5.0 g/dL Final   02/27/2019 2.4 (L) 3.5 - 5.0 g/dL Final     Creatinine   Date Value Ref Range Status   11/29/2019 8.09 (HH) 0.60 - 1.10 mg/dL Final   09/10/2019 7.11 (HH) 0.60 - 1.10 mg/dL Final   09/09/2019 6.57 (HH) 0.60 - 1.10 mg/dL Final       WBC   Date Value Ref Range Status   11/29/2019 7.6 4.0 - 11.0 thou/uL Final   09/10/2019 10.4 4.0 - 11.0 thou/uL Final     Hemoglobin   Date Value Ref Range Status   11/29/2019 12.1 12.0 - 16.0 g/dL Final   09/10/2019 9.1 (L) 12.0 - 16.0 g/dL Final   09/09/2019 11.4 (L) 12.0 - 16.0 g/dL Final     Platelets   Date Value Ref Range Status   11/29/2019 221 140 - 440 thou/uL Final   09/10/2019 258 140 - 440 thou/uL Final   09/09/2019 265 140 - 440 thou/uL Final       Lab Results   Component Value Date    RF 61.3 (H) 07/07/2017    SEDRATE 82 (H) 09/05/2017     Duration of the call:8  Minutes  Call start: 403   pm  Call end:   412 pm

## 2021-06-08 NOTE — TELEPHONE ENCOUNTER
Resent prednisone rx to [facility] pharmacy per pt request as previous pharmacy is temporarily closed

## 2021-06-09 NOTE — PROGRESS NOTES
Clinic Care Coordination Contact  [facility]/Voicemail       Clinical Data: Care Coordinator Outreach  Outreach attempted x 1.  Left message on patient's voicemail with call back information and requested return call.  Plan:  Care Coordinator will try to reach patient again in 1-2 business days.

## 2021-06-10 NOTE — PROGRESS NOTES
Clinic Care Coordination Contact  [facility]/Voicemail       Clinical Data: Care Coordinator Outreach  Outreach attempted x 2.  Left message on patient's voicemail via  with call back information and requested return call.  Plan: Care Coordinator will send care coordination introduction letter with care coordinator contact information and explanation of care coordination services via [employer].   Care Coordinator will do no further outreaches at this time.

## 2021-06-11 NOTE — PROGRESS NOTES
[facility] Clinic Follow Up Note    Kevin Ellis   72 y.o. female    Date of Visit: 7/7/2017    Chief Complaint   Patient presents with     Follow-up     not feeling well, has not been seen in 2 years     Subjective  This is a 72-year-old lady who has not been in in about 2 years.  Her primary problem since I last saw her and her reason for coming in today is increasing problems with multiple joint pains.  She talks about discomfort in her shoulders and arms as well as her knees and lower legs.  No apparent back or neck issues.  This seems to have gotten worse within the past 1 month.  2 years ago she was on some medication for her arthritis but she stopped because of side effects.  Is near as I can tell she has not been seen a doctor in the intervening 2 years.  She offers no other particular complaints today but is very concerned about the joint issues as is yesterday she had a great deal of difficulty in even getting up and walking.  She does say that there is occasionally some swelling in the muscles and joints.  Again, she is not really taking any medication for it at this point in time.    ROS A comprehensive review of systems was performed and was otherwise negative    Medications, allergies, and problem list were reviewed and updated    Exam  General Appearance:   On examination her blood pressure is 132/70.  Weight is 186 pounds and height is 58 inches.  BMI is 38.87.    Heart is in sinus rhythm with a rate of 70 and no ectopy.    Good range of motion in the shoulders and arms.  There is no particular swelling in the hands or wrist at this time and no tenderness.    No edema in the lower extremities.  No swelling in the knees.  Range of motion in the knees is good.  She does have varicose veins.    The patient is alert and oriented ×3.      Assessment/Plan  1. Joint pain  Erythrocyte Sedimentation Rate    Antinuclear Antibody (CAROLYN) Cascade    Rheumatoid Factor Quant     C-Reactive Protein    HM1(CBC and Differential)    HM1 (CBC with Diff)     Multiple joint pains.  We did discuss this with her through the .  We will proceed with some blood work to look for any signs of inflammatory disease.  She is asking about the possibility of a referral to a rheumatologist and I told her that this is certainly a possibility but we would await the results of the blood work.  I will contact her with these results by mail and then decide what our next step needs to be.    Total time of this office visit was 25 minutes with greater than 50% of the time spent in care coordination and patient counseling.  Body Mass Index was not assessed due to The patient was in with an acute medical issue..    Celso Cruz MD      Current Outpatient Prescriptions on File Prior to Visit   Medication Sig     amLODIPine (NORVASC) 5 MG tablet Take 1 tablet (5 mg total) by mouth daily.     azithromycin (ZITHROMAX) 250 MG tablet      benzonatate (TESSALON) 100 MG capsule      cyclobenzaprine (FLEXERIL) 10 MG tablet Take 10 mg by mouth 3 (three) times a day as needed for muscle spasms.     TAMIFLU 75 mg capsule      No current facility-administered medications on file prior to visit.      Allergies   Allergen Reactions     Ibuprofen      Social History   Substance Use Topics     Smoking status: Never Smoker     Smokeless tobacco: Never Used     Alcohol use None

## 2021-06-11 NOTE — PROGRESS NOTES
"Kevin Ellis is a 75 y.o. female who is being evaluated via a billable telephone visit.      The patient has been notified of following:     \"This telephone visit will be conducted via a call between you and your physician/provider. We have found that certain health care needs can be provided without the need for a physical exam.  This service lets us provide the care you need with a short phone conversation.  If a prescription is necessary we can send it directly to your pharmacy.  If lab work is needed we can place an order for that and you can then stop by our lab to have the test done at a later time.    Telephone visits are billed at different rates depending on your insurance coverage. During this emergency period, for some insurers they may be billed the same as an in-person visit.  Please reach out to your insurance provider with any questions.    If during the course of the call the physician/provider feels a telephone visit is not appropriate, you will not be charged for this service.\"    Patient has given verbal consent to a Telephone visit? Yes    What phone number would you like to be contacted at? [phone]    Patient would like to receive their AVS by AVS Preference: Samantha.      ASSESSMENT AND PLAN:    Diagnoses and all orders for this visit:    Seropositive rheumatoid arthritis of multiple sites (H)    High risk medication use    Cyclic citrullinated peptide (CCP) antibody positive    ESRD (end stage renal disease) on dialysis (H)          HISTORY OF PRESENTING ILLNESS:  Kevin Ellis 75 y.o. is evaluated here via  phone link.  No  is on the third line.  This is a follow-up.  She has rheumatoid arthritis, longstanding, seropositive, severe, having tried a variety of medications both synthetic DMARDs, Biologics, the rituximab seems to have helped her the most.  She started on prednisone on her previous visit she is tapering it down now 2.5 mg is " going to finish as she runs around.  She is not experiencing a flare.  She is aware of the management principles of osteoarthritis.  ROS enquiry held for fever, ocular symptoms, rash, headache,  GI issues.  Today we also discussed the issues related to the current pandemic, the pros and cons of the current treatment plan, the CDC guidelines such as social distancing washing the hands covering the cough.  ALLERGIES:Ibuprofen    PAST MEDICAL/ACTIVE PROBLEMS/MEDICATION/SOCIAL DATA  Past Medical History:   Diagnosis Date     Agatston CAC score of 0 (zero) 2008    at [facility]     Anemia of chronic renal failure      Celiac disease      Compression fracture of body of thoracic vertebra (H)      Cyclic citrullinated peptide (CCP) antibody positive      ESRD (end stage renal disease) on dialysis (H)     Dr. Aly Randhawa, DO; gets dialysis at [facility]     Hypertension      Inverted nipple      Osteoporosis     severe     Ovarian cyst      Secondary renal hyperparathyroidism (H)      Seropositive rheumatoid arthritis (H)      Social History     Tobacco Use   Smoking Status Never Smoker   Smokeless Tobacco Never Used     Patient Active Problem List   Diagnosis     Seropositive rheumatoid arthritis of multiple sites (H)     High risk medication use     Anemia of chronic renal failure     Cyclic citrullinated peptide (CCP) antibody positive     ESRD (end stage renal disease) on dialysis (H)     Secondary renal hyperparathyroidism (H)     Ovarian cyst follow-up May 2020     Current Outpatient Medications   Medication Sig Dispense Refill     acetaminophen (TYLENOL) 500 MG tablet 1-2 tablets every 4-6 hours as needed for pain. No more than 8 per 24 hour period.       diclofenac sodium (VOLTAREN) 1 % Gel Apply topically 4 (four) times a day.       famotidine (PEPCID) 20 MG tablet Take 1 tablet (20 mg total) by mouth 2 (two) times a day. 180 tablet 3     Lactobacillus acidophilus (PROBIOTIC ORAL) Take by mouth.        lidocaine-prilocaine (EMLA) cream APPLY SMALL AMOUNT TO ACCESS SITE (AVF) 1 TO 2 HOURS BEFORE DIALYSIS. COVER WITH OCCLUSIVE DRESSING (SARAN WRAP)       loperamide HCl (ANTI-DIARRHEA ORAL) Take by mouth.       predniSONE (DELTASONE) 2.5 MG tablet TAKE 2 TABS (5MG) BY MOUTH ONCE DAILY FOR 1 MONTH THEN REDUCE TO 1 TAB (2.5MG) DAILY FOR 1 MONTH THEN STOP. 90 tablet 0     sevelamer HCL (RENAGEL) 800 MG tablet TAKE 1 TABLET BY MOUTH THREE TIMES A DAY WITH MEALS AND 1 TABLET TWICE A DAY WITH SNACKS       No current facility-administered medications for this visit.          EXAMINATION: Sounded  LAB / IMAGING DATA:  ALT   Date Value Ref Range Status   11/29/2019 14 0 - 45 U/L Final   07/30/2019 10 0 - 45 U/L Final   02/20/2019 <9 0 - 45 U/L Final     Albumin   Date Value Ref Range Status   11/29/2019 3.2 (L) 3.5 - 5.0 g/dL Final   07/30/2019 3.6 3.5 - 5.0 g/dL Final   02/27/2019 2.4 (L) 3.5 - 5.0 g/dL Final     Creatinine   Date Value Ref Range Status   11/29/2019 8.09 (HH) 0.60 - 1.10 mg/dL Final   09/10/2019 7.11 (HH) 0.60 - 1.10 mg/dL Final   09/09/2019 6.57 (HH) 0.60 - 1.10 mg/dL Final       WBC   Date Value Ref Range Status   11/29/2019 7.6 4.0 - 11.0 thou/uL Final   09/10/2019 10.4 4.0 - 11.0 thou/uL Final     Hemoglobin   Date Value Ref Range Status   11/29/2019 12.1 12.0 - 16.0 g/dL Final   09/10/2019 9.1 (L) 12.0 - 16.0 g/dL Final   09/09/2019 11.4 (L) 12.0 - 16.0 g/dL Final     Platelets   Date Value Ref Range Status   11/29/2019 221 140 - 440 thou/uL Final   09/10/2019 258 140 - 440 thou/uL Final   09/09/2019 265 140 - 440 thou/uL Final       Lab Results   Component Value Date    RF 61.3 (H) 07/07/2017    SEDRATE 82 (H) 09/05/2017     Duration of the call:6  Minutes

## 2021-06-12 NOTE — PROGRESS NOTES
ASSESSMENT AND PLAN:  Kevin Ellis 72 y.o. female is seen here on 09/05/17 is a for follow-up of recent visit.  She turned out to have seropositive double rheumatoid arthritis.  I discussed this with her.  She is going to start methotrexate.  The need for labs major side effects reviewed via the .  The [employer] handout provided images in English with the  is going to help her through that.  She noted that she can read some of that.  She is concerned about the potential side effects of prednisone.  She is going to take 10 mg daily.  She will return for labs in 4 weeks and then a and thereafter in 4 weeks.  If the first set of labs are normal increase the methotrexate from 10-20 mg per week.  Toward the end of consultation she noted that she had in fact in the past been seen elsewhere for rheumatoid arthritis.    Diagnoses and all orders for this visit:    Elevated sed rate    Seropositive rheumatoid arthritis of multiple sites  -     methotrexate 2.5 MG tablet; Take 4 tablets (10 mg total) by mouth once a week.  Dispense: 16 tablet; Refill: 0  -     folic acid (FOLVITE) 1 MG tablet; Take 1 tablet (1 mg total) by mouth daily.  Dispense: 30 tablet; Refill: 11  -     C-Reactive Protein  -     Erythrocyte Sedimentation Rate  -     HM1(CBC and Differential)  -     Creatinine  -     ALT (SGPT)  -     Albumin  -     HM1 (CBC with Diff)    High risk medication use  -     C-Reactive Protein  -     Erythrocyte Sedimentation Rate  -     HM1(CBC and Differential)  -     Creatinine  -     ALT (SGPT)  -     Albumin  -     HM1 (CBC with Diff)  -     ALT (SGPT); Standing  -     Albumin; Standing  -     Creatinine; Standing  -     HM2(CBC w/o Differential); Standing  -     Cancel: ALT (SGPT)  -     Cancel: Albumin  -     Cancel: Creatinine  -     Cancel: HM2(CBC w/o Differential)      HISTORY OF PRESENTING ILLNESS:  Kevin Ellis, 72 y.o., female is  here for follow-up of recent evaluation for widespread joint area pains.  She turned out to have positive rheumatoid factor along with anti-CCP antibody.  Her sed rate as noted was elevated.  She reported there was significant improvement with prednisone.  She dropped her pain level from 9.0-9.5/10 previously down to nearly 0.5-0/10.  However she stopped taking prednisone because of the stomach upset.  She reports that overall her joints are doing so much better and this feels somewhat comfortable.  This is an acute onset.  This is in her lower back, thighs radiating toward the knees.  She reports this is gone on for the past 2 months.  This is worse the past few days.  This is most bothersome first thing in the morning.  She has stiffness which is sustained.  After that she developed pain in her shoulders across her neck and back.  With similar characteristics.  She has not had swollen tender joints that she is aware of and small joints of the hands wrists elbows and knees.  She had left hip replacement 15 years ago and [place].  She has not taken anything over-the-counter yet or prednisone or ibuprofen for the symptoms.  She has not taken acetaminophen.  She has tried heat and cold.  She is not sure if those helped.  This is interfering with a variety of day-to-day activities.  She rates the pain as moderately severe to severe.  She reported no jaw claudication double vision or headaches.  She has noted no family or personal history of psoriasis.  She is accompanied by her  and the family.   Further historical information and ADL limitations as noted in the multidimensional health assessment questionnaire attached in the EMR. Rest of the 13 system ROS is negative.     ALLERGIES:Ibuprofen    PAST MEDICAL/ACTIVE PROBLEMS/MEDICATION/ FAMILY HISTORY/SOCIAL DATA:  The patient has a family history of  Past Medical History:   Diagnosis Date     Arthritis      Hypertension      Inverted nipple      History  "  Smoking Status     Never Smoker   Smokeless Tobacco     Never Used     Patient Active Problem List   Diagnosis     Bilateral low back pain without sciatica     Chronic pain of both shoulders     Elevated sed rate     Current Outpatient Prescriptions   Medication Sig Dispense Refill     predniSONE (DELTASONE) 10 mg tablet Take 1.5 tablets (15 mg total) by mouth daily. 45 tablet 0     No current facility-administered medications for this visit.      DETAILED EXAMINATION  09/05/17  :  Vitals:    09/05/17 1541   BP: 124/68   Patient Site: Right Arm   Patient Position: Sitting   Cuff Size: Adult Regular   Pulse: 68   Weight: 181 lb 9.6 oz (82.4 kg)   Height: 4' 10\" (1.473 m)     Alert oriented. Head including the face is examined for malar rash, heliotropes, scarring, lupus pernio. Eyes examined for redness such as in episcleritis/scleritis, periorbital lesions.   Neck examined  for lymph nodes, range of motion Both upper and lower extremities (all four) examined for swollen, warm &/or  tender joints, range of motion, rash, muscle weakness, edema. The salient normal / abnormal findings are appended. No appreciable synovitis in any of the palpable appendicular joints.  Full range of motion of the shoulders without painful arc noticed previously.        LAB / IMAGING DATA:  ALT   Date Value Ref Range Status   12/17/2014 15 0 - 45 U/L Final     Albumin   Date Value Ref Range Status   12/17/2014 3.4 (L) 3.5 - 5.0 g/dL Final     Creatinine   Date Value Ref Range Status   12/17/2014 1.37 (H) 0.60 - 1.10 mg/dL Final       WBC   Date Value Ref Range Status   07/07/2017 14.4 (H) 4.0 - 11.0 thou/uL Final     Hemoglobin   Date Value Ref Range Status   07/07/2017 10.4 (L) 12.0 - 16.0 g/dL Final     Platelets   Date Value Ref Range Status   07/07/2017 331 140 - 440 thou/uL Final       Lab Results   Component Value Date    RF 61.3 (H) 07/07/2017    SEDRATE 111 (H) 07/07/2017          "

## 2021-06-12 NOTE — PROGRESS NOTES
ASSESSMENT AND PLAN:  Kevin Ellis 72 y.o. female is seen here on 08/08/17 for evaluation of proximal upper and lower extremity pain, stiffness, acute in onset, worse in the mornings, with elevated sed rate and CRP, modest elevation of rheumatoid factor.  I have outlined to her the wound the concerns here would be an inflammatory syndrome.  Polymyalgia rheumatica can present like this will can rheumatoid arthritis.  Further workup is indicated.  She will have her x-rays and labs done as noted.  X-rays of the shoulders taken today, personally reviewed, findings: Reduced subacromial space, minimal osteoarthritic changes in the glenohumeral joints.  Starting on 15 mg of prednisone major side effects via the  outlined including ocular metabolic bone related to name a few.  Have asked her to return for follow-up in the next 3-4 weeks.  Diagnoses and all orders for this visit:    Chronic bilateral low back pain without sciatica  -     CCP Antibodies  -     Hepatitis C Antibody (Anti-HCV)  -     CK Total  -     Cytoplasmic Neutrophil Antibodies  -     XR Shoulders Bilateral 2 Or More Views; Future; Expected date: 8/8/17  -     XR Knees Bilateral 1 Or 2 VWS; Future; Expected date: 8/8/17  -     predniSONE (DELTASONE) 10 mg tablet; Take 1.5 tablets (15 mg total) by mouth daily.  Dispense: 45 tablet; Refill: 0    Chronic pain of both shoulders  -     CCP Antibodies  -     Hepatitis C Antibody (Anti-HCV)  -     CK Total  -     Cytoplasmic Neutrophil Antibodies  -     XR Shoulders Bilateral 2 Or More Views; Future; Expected date: 8/8/17  -     XR Knees Bilateral 1 Or 2 VWS; Future; Expected date: 8/8/17  -     predniSONE (DELTASONE) 10 mg tablet; Take 1.5 tablets (15 mg total) by mouth daily.  Dispense: 45 tablet; Refill: 0    Elevated sed rate  -     predniSONE (DELTASONE) 10 mg tablet; Take 1.5 tablets (15 mg total) by mouth daily.  Dispense: 45 tablet; Refill: 0      HISTORY OF PRESENTING  ILLNESS:  Kevin Ellis, 72 y.o., female is here for evaluation of pain.  This is an acute onset.  This is in her lower back, thighs radiating toward the knees.  She reports this is gone on for the past 2 months.  This is worse the past few days.  This is most bothersome first thing in the morning.  She has stiffness which is sustained.  After that she developed pain in her shoulders across her neck and back.  With similar characteristics.  She has not had swollen tender joints that she is aware of and small joints of the hands wrists elbows and knees.  She had left hip replacement 15 years ago and [place].  She has not taken anything over-the-counter yet or prednisone or ibuprofen for the symptoms.  She has not taken acetaminophen.  She has tried heat and cold.  She is not sure if those helped.  This is interfering with a variety of day-to-day activities.  She rates the pain as moderately severe to severe.  She reported no jaw claudication double vision or headaches.  She has noted no family or personal history of psoriasis.  She is accompanied by her  and the family.   Further historical information and ADL limitations as noted in the multidimensional health assessment questionnaire attached in the EMR. Rest of the 13 system ROS is negative.     ALLERGIES:Ibuprofen    PAST MEDICAL/ACTIVE PROBLEMS/MEDICATION/ FAMILY HISTORY/SOCIAL DATA:  The patient has a family history of  Past Medical History:   Diagnosis Date     Arthritis      Hypertension      Inverted nipple      History   Smoking Status     Never Smoker   Smokeless Tobacco     Never Used     Patient Active Problem List   Diagnosis     Bilateral low back pain without sciatica     Chronic pain of both shoulders     Elevated sed rate     Current Outpatient Prescriptions   Medication Sig Dispense Refill     predniSONE (DELTASONE) 10 mg tablet Take 1.5 tablets (15 mg total) by mouth daily. 45 tablet 0     No current  "facility-administered medications for this visit.        COMPREHENSIVE EXAMINATION:  Vitals:    08/08/17 1037   BP: 122/64   Patient Site: Right Arm   Patient Position: Sitting   Cuff Size: Adult Regular   Pulse: 60   Weight: 181 lb 9.6 oz (82.4 kg)   Height: 4' 10\" (1.473 m)     A well appearing alert oriented female. Vital data as noted above. Her eyes without inflammation/scleromalacia. ENTwithout oral mucositis, thrush, nasal deformity, external ear redness, deformity. Her neck is without lymphadenopathy and supple. Lungs normal sounds, no pleural rub. Heart auscultation normal rate, rhythm; no pericardial rub and murmurs. Abdomen soft, non tender, no organomegaly. Skin examined for heliotrope, malar area eruption, lupus pernio, periungual erythema, sclerodactyly, papules, erythema nodosum, purpura, nail pitting, onycholysis, and obvious psoriasis lesion. Neurological examination shows normal alertness, speech, facial symmetry, tone and power in upper and lower extremities, Tinel's and Phalen's at wrist and gait. The joint examination is performed for swelling, tenderness, warmth, erythema, and range of motion in the following joints: DIPs, PIPs, MCPs, wrists, first CMC's, elbows, shoulders, hips, knees, ankles, feet; spine for range of motion and paraspinal muscles for tenderness. The salient normal / abnormal findings are appended.  She has impingement in both her shoulders with painful arc on abduction.  She has no synovitis in any of the palpable appendicular joints.  There is no tenderness in the trochanteric area.  Range of motion of the hip and knee joints remarkably intact.  She does not have dactylitis or enthesitis.    LAB / IMAGING DATA:  ALT   Date Value Ref Range Status   12/17/2014 15 0 - 45 U/L Final     Albumin   Date Value Ref Range Status   12/17/2014 3.4 (L) 3.5 - 5.0 g/dL Final     Creatinine   Date Value Ref Range Status   12/17/2014 1.37 (H) 0.60 - 1.10 mg/dL Final       WBC   Date Value " Ref Range Status   07/07/2017 14.4 (H) 4.0 - 11.0 thou/uL Final     Hemoglobin   Date Value Ref Range Status   07/07/2017 10.4 (L) 12.0 - 16.0 g/dL Final     Platelets   Date Value Ref Range Status   07/07/2017 331 140 - 440 thou/uL Final       Lab Results   Component Value Date    RF 61.3 (H) 07/07/2017    SEDRATE 111 (H) 07/07/2017

## 2021-06-12 NOTE — TELEPHONE ENCOUNTER
Discussed with patient and family today. Discussed with both sons, daughter in law and the patient. Patient has decided to stop dialysis. Patient understand that she will pass away naturally for ESRD at this time. Discussed time frame and possible symptoms. Discussed with family and they would like hospice support. Order for hospice placed. They will plan to call Ashleigh daughter in law to start the process.

## 2021-06-13 NOTE — PROGRESS NOTES
"Kevin Ellis is a 75 y.o. female who is being evaluated via a billable telephone visit.      The patient has been notified of following:     \"This telephone visit will be conducted via a call between you and your physician/provider. We have found that certain health care needs can be provided without the need for a physical exam.  This service lets us provide the care you need with a short phone conversation.  If a prescription is necessary we can send it directly to your pharmacy.  If lab work is needed we can place an order for that and you can then stop by our lab to have the test done at a later time.    Telephone visits are billed at different rates depending on your insurance coverage. During this emergency period, for some insurers they may be billed the same as an in-person visit.  Please reach out to your insurance provider with any questions.    If during the course of the call the physician/provider feels a telephone visit is not appropriate, you will not be charged for this service.\"    Patient has given verbal consent to a Telephone visit? Yes    What phone number would you like to be contacted at? [phone]    Patient would like to receive their AVS by AVS Preference: Samantha.      This document was created using a software with less than 100% fidelity, at times resulting in unintended, even erroneous syntax and grammar.  The reader is advised to keep this under consideration while reviewing, interpreting this note.    ASSESSMENT AND PLAN:    Diagnoses and all orders for this visit:    Seropositive rheumatoid arthritis of multiple sites (H)    Cyclic citrullinated peptide (CCP) antibody positive    ESRD (end stage renal disease) on dialysis (H)    High risk medication use          HISTORY OF PRESENTING ILLNESS:  Kevin Ellis 75 y.o. is evaluated here via telephone link, the  on the third line.  She has severe rheumatoid arthritis.  This is " longstanding.  This is affected multiple joints.  This is seropositive.  In the past she had tried a variety of csDMARDs, various Biologics.  The best response has been weaned from rituximab.  Most recently she had it done on 712 again 7/26/2019.  She reported no flareup of her joint symptoms.  She is aware of the options for management of osteoarthritis and takes Tylenol for that.  She goes 3 times per week for hemodialysis for end-stage renal disease.  She noted no limitation in day-to-day activity.  She will stay the course.  We will meet here in 3 months or sooner.     ROS enquiry held for fever, ocular symptoms, rash, headache,  GI issues.  Today we also discussed the issues related to the current pandemic, the pros and cons of the current treatment plan, the CDC guidelines such as social distancing washing the hands covering the cough.  ALLERGIES:Ibuprofen    PAST MEDICAL/ACTIVE PROBLEMS/MEDICATION/SOCIAL DATA  Past Medical History:   Diagnosis Date     Agatston CAC score of 0 (zero) 2008    at [facility]     Anemia of chronic renal failure      Celiac disease      Compression fracture of body of thoracic vertebra (H)      Cyclic citrullinated peptide (CCP) antibody positive      ESRD (end stage renal disease) on dialysis (H)     Dr. Aly Randhawa, DO; gets dialysis at [facility]     Hypertension      Inverted nipple      Osteoporosis     severe     Ovarian cyst      Secondary renal hyperparathyroidism (H)      Seropositive rheumatoid arthritis (H)      Social History     Tobacco Use   Smoking Status Never Smoker   Smokeless Tobacco Never Used     Patient Active Problem List   Diagnosis     Seropositive rheumatoid arthritis of multiple sites (H)     High risk medication use     Anemia of chronic renal failure     Cyclic citrullinated peptide (CCP) antibody positive     ESRD (end stage renal disease) on dialysis (H)     Secondary renal hyperparathyroidism (H)     Ovarian cyst follow-up May 2020     Current  Outpatient Medications   Medication Sig Dispense Refill     acetaminophen (TYLENOL) 500 MG tablet 1-2 tablets every 4-6 hours as needed for pain. No more than 8 per 24 hour period.       diclofenac sodium (VOLTAREN) 1 % Gel Apply topically 4 (four) times a day.       famotidine (PEPCID) 20 MG tablet Take 1 tablet (20 mg total) by mouth 2 (two) times a day. 180 tablet 3     Lactobacillus acidophilus (PROBIOTIC ORAL) Take by mouth.       lidocaine-prilocaine (EMLA) cream APPLY SMALL AMOUNT TO ACCESS SITE (AVF) 1 TO 2 HOURS BEFORE DIALYSIS. COVER WITH OCCLUSIVE DRESSING (SARAN WRAP)       loperamide HCl (ANTI-DIARRHEA ORAL) Take by mouth.       predniSONE (DELTASONE) 2.5 MG tablet TAKE 2 TABS (5MG) BY MOUTH ONCE DAILY FOR 1 MONTH THEN REDUCE TO 1 TAB (2.5MG) DAILY FOR 1 MONTH THEN STOP. 90 tablet 0     sevelamer HCL (RENAGEL) 800 MG tablet TAKE 1 TABLET BY MOUTH THREE TIMES A DAY WITH MEALS AND 1 TABLET TWICE A DAY WITH SNACKS       No current facility-administered medications for this visit.          EXAMINATION:   On the phone she sounded comfortable, alert, oriented, her speech appeared to be fluent.  Her memory recall appeared normal.  She did not sound like she was in pain or short of breath.    LAB / IMAGING DATA:  ALT   Date Value Ref Range Status   09/03/2020 10 0 - 45 U/L Final   11/29/2019 14 0 - 45 U/L Final   07/30/2019 10 0 - 45 U/L Final     Albumin   Date Value Ref Range Status   09/03/2020 3.2 (L) 3.5 - 5.0 g/dL Final   11/29/2019 3.2 (L) 3.5 - 5.0 g/dL Final   07/30/2019 3.6 3.5 - 5.0 g/dL Final     Creatinine   Date Value Ref Range Status   09/03/2020 4.85 (H) 0.60 - 1.10 mg/dL Final   11/29/2019 8.09 (HH) 0.60 - 1.10 mg/dL Final   09/10/2019 7.11 (HH) 0.60 - 1.10 mg/dL Final       WBC   Date Value Ref Range Status   09/03/2020 10.2 4.0 - 11.0 thou/uL Final   11/29/2019 7.6 4.0 - 11.0 thou/uL Final     Hemoglobin   Date Value Ref Range Status   09/03/2020 11.1 (L) 12.0 - 16.0 g/dL Final   11/29/2019  12.1 12.0 - 16.0 g/dL Final   09/10/2019 9.1 (L) 12.0 - 16.0 g/dL Final     Platelets   Date Value Ref Range Status   09/03/2020 340 140 - 440 thou/uL Final   11/29/2019 221 140 - 440 thou/uL Final   09/10/2019 258 140 - 440 thou/uL Final       Lab Results   Component Value Date    RF 61.3 (H) 07/07/2017    SEDRATE 82 (H) 09/05/2017     Duration of the call:6  Minutes  Call start: 1150am  Call end:   1156am

## 2021-06-14 NOTE — PROGRESS NOTES
ASSESSMENT AND PLAN:  Kevin Ellis 72 y.o. female is seen here on 11/09/17 is a for follow-up of seropositive rheumatoid arthritis.  She has worsening renal failure.  Methotrexate which has helped her so well will have to be discontinued.  This is discussed with her.  Starting her on leflunomide 10 mg daily.  Major side effects outlined.  Reminded her not to take nonsteroidals.  Return for follow-up here in 3 months with labs every 4 weeks.  Diagnoses and all orders for this visit:    Seropositive rheumatoid arthritis of multiple sites  -     Albumin  -     ALT (SGPT)  -     Creatinine  -     HM2(CBC w/o Differential)  -     leflunomide (ARAVA) 10 MG tablet; Take 1 tablet (10 mg total) by mouth daily.  Dispense: 30 tablet; Refill: 1    High risk medication use  -     Albumin  -     ALT (SGPT)  -     Creatinine  -     HM2(CBC w/o Differential)    Elevated sed rate    CRF (chronic renal failure), stage 3 (moderate)      HISTORY OF PRESENTING ILLNESS:  Kevin Ellis, 72 y.o., female is here for follow-up of seropositive severe rheumatoid arthritis in the background of severe renal impairment.  This was discovered recently.  She is doing so much better now with the joint pains that she thought that she should perhaps not even keep this appointment.  She reports no issues taking methotrexate except that she feels that her digestive system is not working very well by that she means that if she takes certain colored drinks she can see the color in her feces.  She is going to check these issues with her primary physician.  We talked about the GI side effects of methotrexate.  Does not seem like that is an issue here.  She noted no pain level of 0/10.  However noted left hip area pain which is mild.  This troubles her at night.  There is no radiation.  There is no history of trauma or rash.  She noted morning stiffness is 5 minutes.  There is no fever or weight loss she has not had  mouth ulcers no cough no rash. She has noted no family or personal history of psoriasis.  She is accompanied by her  and the family.   Further historical information and ADL limitations as noted in the multidimensional health assessment questionnaire attached in the EMR.  .     ALLERGIES:Ibuprofen    PAST MEDICAL/ACTIVE PROBLEMS/MEDICATION/ FAMILY HISTORY/SOCIAL DATA:  The patient has a family history of  Past Medical History:   Diagnosis Date     Arthritis      Hypertension      Inverted nipple      History   Smoking Status     Never Smoker   Smokeless Tobacco     Never Used     Patient Active Problem List   Diagnosis     Bilateral low back pain without sciatica     Chronic pain of both shoulders     Elevated sed rate     Seropositive rheumatoid arthritis of multiple sites     High risk medication use     Current Outpatient Prescriptions   Medication Sig Dispense Refill     folic acid (FOLVITE) 1 MG tablet Take 1 tablet (1 mg total) by mouth daily. 30 tablet 11     methotrexate 2.5 MG tablet TAKE 4 TABLETS (10 MG TOTAL) BY MOUTH ONCE A WEEK. 16 tablet 1     No current facility-administered medications for this visit.      DETAILED EXAMINATION  11/09/17  :  There were no vitals filed for this visit.  Alert oriented. Head including the face is examined for malar rash, heliotropes, scarring, lupus pernio. Eyes examined for redness such as in episcleritis/scleritis, periorbital lesions.   Neck examined  for range of motion. Both upper and lower extremities (all four) examined for swollen, warm &/or  tender joints, range of motion, rash, muscle weakness, edema. The salient normal / abnormal findings are appended. No appreciable synovitis in any of the palpable appendicular joints.   She does not have appreciable synovitis in any of the palpable appendicular joints.  There is no impingement in the shoulders.  She is accompanied by her .  Knees without tenderness in the joint lines.  No effusion or  warmth there.          LAB / IMAGING DATA:  ALT   Date Value Ref Range Status   10/02/2017 18 0 - 45 U/L Final   09/05/2017 23 0 - 45 U/L Final   12/17/2014 15 0 - 45 U/L Final     Albumin   Date Value Ref Range Status   10/02/2017 2.6 (L) 3.5 - 5.0 g/dL Final   09/05/2017 3.0 (L) 3.5 - 5.0 g/dL Final   12/17/2014 3.4 (L) 3.5 - 5.0 g/dL Final     Creatinine   Date Value Ref Range Status   10/02/2017 2.72 (H) 0.60 - 1.10 mg/dL Final   09/05/2017 3.10 (H) 0.60 - 1.10 mg/dL Final   12/17/2014 1.37 (H) 0.60 - 1.10 mg/dL Final       WBC   Date Value Ref Range Status   10/02/2017 7.6 4.0 - 11.0 thou/uL Final   09/05/2017 10.6 4.0 - 11.0 thou/uL Final     Hemoglobin   Date Value Ref Range Status   10/02/2017 10.4 (L) 12.0 - 16.0 g/dL Final   09/05/2017 11.2 (L) 12.0 - 16.0 g/dL Final   07/07/2017 10.4 (L) 12.0 - 16.0 g/dL Final     Platelets   Date Value Ref Range Status   10/02/2017 381 140 - 440 thou/uL Final   09/05/2017 289 140 - 440 thou/uL Final   07/07/2017 331 140 - 440 thou/uL Final       Lab Results   Component Value Date    RF 61.3 (H) 07/07/2017    SEDRATE 82 (H) 09/05/2017

## 2021-06-14 NOTE — TELEPHONE ENCOUNTER
[facility] lab calling with a critical lab result.     Cr 6.5    Ordering provider: Dr Maurice with [facility]  Patient has ESRD, on dialysis  Upcoming PD catheter on 2/12 at [facility]    RN paged on call provider for [facility], Wilfrid Armstrong MD via answering service at 5:57 pm to call RN back directly at [phone]. Second page sent out. No response. Writer called answering service again and was connected directly with the provider.     Provider verbalized understanding, no new orders, will route message to PCP.    Debra Mccoy, RN/[facility]      Reason for Disposition    Lab or radiology calling with CRITICAL test results    Additional Information    Negative: Lab calling with strep throat test results and triager can call in prescription    Negative: Lab calling with urinalysis test results and triager can call in prescription    Negative: Medication questions    Negative: ED call to PCP    Negative: Physician call to PCP    Negative: Call about patient who is currently hospitalized    Protocols used: PCP CALL - NO TRIAGE-A-

## 2021-06-14 NOTE — PROGRESS NOTES
Preoperative Consultation   Kevin Ellis   75 y.o.  female    Date of visit: 2/1/2021  Physician: Celso Maurice MD    This is a preoperative consultation requested by Dr. Viveros in preparation for peritoneal dialysis catheter on 2/12/21 at [facility], fax [phone].       Assessment and Plan   Kevin Ellis was seen in preoperative consultation in preparation for peritoneal dialysis catheter placement.  This is a low risk surgery and the patient has increased risk for major cardiac complications based on a history of creatinine >2mg/dl.  No contraindication to proposed surgery.  I will check her electrolytes now and it would be reasonable to check them the morning of surgery or the day before surgery to make sure her potassium is okay    1. Pre-operative examination    2. ESRD (end stage renal disease) on dialysis (H)    3. Secondary renal hyperparathyroidism (H)    4. Anemia of chronic renal failure, stage 5 (H)    5. Seropositive rheumatoid arthritis of multiple sites (H)         Patient Profile   Social History     Social History Narrative    She is from [place] and grew up in [place]. Lives with her sons, Timoteo and Leonel.          Past Medical History   Patient Active Problem List   Diagnosis     Seropositive rheumatoid arthritis of multiple sites (H)     High risk medication use     Anemia of chronic renal failure     Cyclic citrullinated peptide (CCP) antibody positive     ESRD (end stage renal disease) on dialysis (H)     Secondary renal hyperparathyroidism (H)     Ovarian cyst follow-up May 2020       Past Surgical History  She has a past surgical history that includes IR Tunneled Catheter Insert (2/15/2019); AV fistula placement (Left, 2/21/2019); Total hip arthroplasty (Left); AV fistula placement (Left, 4/5/2019); US Breast Cyst Aspiration Right (Right, 5/3/2019); Upper gastrointestinal endoscopy (09/05/2019); and AV fistula placement (Left,  9/9/2019).     History of Present Illness   This 75 y.o. old woman comes in for preoperative evaluation.  She has end-stage renal disease and has an AV fistula that is apparently nonfunctional and had a peritoneal dialysis catheter which was removed.  She will now have a catheter placed on the right side.  She otherwise is feeling okay.  She is now off her immunosuppressive medications rheumatoid arthritis.  She said a little bit of swelling and pain in her ankles.    Recent antiplatelet use: no  Personal or family history of bleeding or clotting disorders: no  Steroid use in the past year: no  Personal or family history of difficulty with anesthesia: no  Current cardiopulmonary symptoms: no  Last Menstrual Period: post menopausal     Review of Systems: A comprehensive review of systems was negative except as noted.     Medications and Allergies   Current Outpatient Medications   Medication Sig Dispense Refill     acetaminophen (TYLENOL) 500 MG tablet 1-2 tablets every 4-6 hours as needed for pain. No more than 8 per 24 hour period.       calcium carbonate (OS-AROLDO) 600 mg calcium (1,500 mg) tablet Take 600 mg by mouth 2 (two) times a day with meals.       diclofenac sodium (VOLTAREN) 1 % Gel Apply topically 4 (four) times a day.       famotidine (PEPCID) 20 MG tablet Take 1 tablet (20 mg total) by mouth 2 (two) times a day. 180 tablet 3     gentamicin (GARAMYCIN) 0.1 % cream        Lactobacillus acidophilus (PROBIOTIC ORAL) Take by mouth.       lidocaine-prilocaine (EMLA) cream APPLY SMALL AMOUNT TO ACCESS SITE (AVF) 1 TO 2 HOURS BEFORE DIALYSIS. COVER WITH OCCLUSIVE DRESSING (SARAN WRAP)       loperamide HCl (ANTI-DIARRHEA ORAL) Take by mouth.       sevelamer HCL (RENAGEL) 800 MG tablet TAKE 1 TABLET BY MOUTH THREE TIMES A DAY WITH MEALS AND 1 TABLET TWICE A DAY WITH SNACKS       No current facility-administered medications for this visit.      Allergies   Allergen Reactions     Hydrochlorothiazide Other (See  "Comments)     Ibuprofen         Family and Social History   Family History   Problem Relation Age of Onset     Sudden death Mother 75         in her sleep at home in [facility], autopsy said \"heart attack\"     No Medical Problems Father      No Medical Problems Brother      No Medical Problems Sister      No Medical Problems Brother      No Medical Problems Son      No Medical Problems Daughter      No Medical Problems Daughter      No Medical Problems Daughter      No Medical Problems Daughter      No Medical Problems Son      No Medical Problems Son      No Medical Problems Son         Social History     Tobacco Use     Smoking status: Never Smoker     Smokeless tobacco: Never Used   Substance Use Topics     Alcohol use: No     Frequency: Patient refused     Drug use: No        Physical Exam   General Appearance:   No acute distress    /70 (Patient Site: Left Arm, Patient Position: Sitting, Cuff Size: Adult Regular)   Pulse 86   Ht 5' 2\" (1.575 m)   Wt 146 lb (66.2 kg)   LMP 1985   SpO2 98%   BMI 26.70 kg/m      EYES: Eyelids, conjunctiva, and sclera were normal. Pupils were normal. Cornea, iris, and lens were normal bilaterally.  HEAD, EARS, NOSE, MOUTH, AND THROAT: Head and face were normal. Hearing was normal to voice and the ears were normal to external exam. Nose appearance was normal and there was no discharge. Oropharynx was normal.  NECK: Neck appearance was normal. There were no neck masses and the thyroid was not enlarged.  RESPIRATORY: Breathing pattern was normal and the chest moved symmetrically.  Percussion/auscultatory percussion was normal.  Lung sounds were normal and there were no abnormal sounds.  CARDIOVASCULAR: Heart rate and rhythm were normal.  S1 and S2 were normal and there were no extra sounds or murmurs. Peripheral pulses in arms and legs were normal.  Jugular venous pressure was normal.  There was mild bilateral peripheral edema.  GASTROINTESTINAL: The abdomen was " normal in contour.  Bowel sounds were present.  Percussion detected no organ enlargement or tenderness.  Palpation detected no tenderness, mass, or enlarged organs.   MUSCULOSKELETAL: Skeletal configuration was normal and muscle mass was normal for age. Joint appearance was overall normal.  LYMPHATIC: There were no enlarged nodes.  SKIN/HAIR/NAILS: Skin color was normal.  There were no skin lesions.  Hair and nails were normal.  NEUROLOGIC: The patient was alert and oriented to person, place, time, and circumstance. Speech was normal. Cranial nerves were normal. Motor strength was normal for age. The patient was normally coordinated.  PSYCHIATRIC:  Mood and affect were normal and the patient had normal recent and remote memory. The patient's judgment and insight were normal.       Additional Tests   Laborato labs ordered    Radiology: Reviewed    Electrocardiogram: Normal sinus rhythm, personally reviewed    Total time spent on this encounter was 40 minutes including prep time, time with the patient, time reviewing consultative notes in chart, time dictating and preparing this note.     Celso Maurice MD  Internal Medicine  Contact me at [phone]

## 2021-06-14 NOTE — PROGRESS NOTES
Office Visit   Kevin Ellis   75 y.o. female    Date of Visit: 1/11/2021    Chief Complaint   Patient presents with     Abdominal Pain     Nausea for 1 month        Assessment and Plan   1. Abdominal pain, generalized  She is having significant abdominal pain while they are doing peritoneal dialysis.  She has an appointment tomorrow with her dialysis specialist.  My recommendation is that she discuss her pain with her dialysis specialist to determine next steps.  She is in agreement with this plan.    2. ESRD (end stage renal disease) on dialysis (H)       No follow-ups on file.     History of Present Illness   This 75 y.o. old female comes in with her son due to abdominal pain.  She is on peritoneal dialysis.  Over the past week she has had pain in her abdomen and its quite significant whenever she is doing dialysis.  She has had some nausea with this as well.  She has not had any fevers or chills and no diarrhea or other new symptoms.  She is scheduled to see her dialysis doctor tomorrow and I recommend she discuss with him.    Review of Systems: As above, systems otherwise reviewed and negative.     Medications, Allergies and Problem List   Patient Active Problem List   Diagnosis     Seropositive rheumatoid arthritis of multiple sites (H)     High risk medication use     Anemia of chronic renal failure     Cyclic citrullinated peptide (CCP) antibody positive     ESRD (end stage renal disease) on dialysis (H)     Secondary renal hyperparathyroidism (H)     Ovarian cyst follow-up May 2020     Current Outpatient Medications   Medication Sig Dispense Refill     acetaminophen (TYLENOL) 500 MG tablet 1-2 tablets every 4-6 hours as needed for pain. No more than 8 per 24 hour period.       calcium carbonate (OS-AROLDO) 600 mg calcium (1,500 mg) tablet Take 600 mg by mouth 2 (two) times a day with meals.       sevelamer HCL (RENAGEL) 800 MG tablet TAKE 1 TABLET BY MOUTH THREE TIMES A DAY WITH MEALS AND  "1 TABLET TWICE A DAY WITH SNACKS       diclofenac sodium (VOLTAREN) 1 % Gel Apply topically 4 (four) times a day.       famotidine (PEPCID) 20 MG tablet Take 1 tablet (20 mg total) by mouth 2 (two) times a day. 180 tablet 3     gentamicin (GARAMYCIN) 0.1 % cream        Lactobacillus acidophilus (PROBIOTIC ORAL) Take by mouth.       lidocaine-prilocaine (EMLA) cream APPLY SMALL AMOUNT TO ACCESS SITE (AVF) 1 TO 2 HOURS BEFORE DIALYSIS. COVER WITH OCCLUSIVE DRESSING (SARAN WRAP)       loperamide HCl (ANTI-DIARRHEA ORAL) Take by mouth.       No current facility-administered medications for this visit.      Allergies   Allergen Reactions     Hydrochlorothiazide Other (See Comments)     Ibuprofen           Physical Exam     /68 (Patient Site: Right Arm, Patient Position: Sitting)   Pulse 78   Temp 97.3  F (36.3  C)   Ht 5' 2\" (1.575 m)   Wt 149 lb (67.6 kg)   LMP 05/05/1985   SpO2 100%   BMI 27.25 kg/m      General: This is an alert female in no apparent distress.     Additional Information   Social History     Tobacco Use     Smoking status: Never Smoker     Smokeless tobacco: Never Used   Substance Use Topics     Alcohol use: No     Frequency: Patient refused     Drug use: No            Marilee Bernstein MD    "

## 2021-06-15 NOTE — ANESTHESIA POSTPROCEDURE EVALUATION
Patient: Kevin Cavazos DeBmariluz  Procedure(s):  MESENTERIC BIOPSY, LAPAROSCOPIC PLACEMENT OF PERITONEAL DIALYSIS CATHETER  EXCISION, CYST, OVARY, LAPAROSCOPIC (Left)  Anesthesia type: general    Patient location: PACU  Last vitals:   Vitals Value Taken Time   /50 02/11/21 1241   Temp 37.7  C (99.8  F) 02/11/21 1130   Pulse 80 02/11/21 1242   Resp 29 02/11/21 1231   SpO2 93 % 02/11/21 1242   Vitals shown include unvalidated device data.  Post vital signs: stable  Level of consciousness: awake and responds to simple questions  Post-anesthesia pain: pain controlled  Post-anesthesia nausea and vomiting: no  Pulmonary: unassisted, return to baseline  Cardiovascular: stable and blood pressure at baseline  Hydration: adequate  Anesthetic events: no    QCDR Measures:  ASA# 11 - Rakel-op Cardiac Arrest: ASA11B - Patient did NOT experience unanticipated cardiac arrest  ASA# 12 - Rakel-op Mortality Rate: ASA12B - Patient did NOT die  ASA# 13 - PACU Re-Intubation Rate: ASA13B - Patient did NOT require a new airway mgmt  ASA# 10 - Composite Anes Safety: ASA10A - No serious adverse event    Additional Notes:

## 2021-06-15 NOTE — PROGRESS NOTES
Kevin Ellis is a 75 y.o. female who is being evaluated via a billable telephone visit.      What phone number would you like to be contacted at? [phone]  How would you like to obtain your AVS? AVS Preference: Mail a copy.  Phone call duration: 7 minutes    This document was created using a software with less than 100% fidelity, at times resulting in unintended, even erroneous syntax and grammar.  The reader is advised to keep this under consideration while reviewing, interpreting this note.           ASSESSMENT AND PLAN:    Diagnoses and all orders for this visit:    Seropositive rheumatoid arthritis of multiple sites (H)  -     Cancel: XR Elbow Right 3 or More VWS; Future; Expected date: 03/04/2021  -     Cancel: XR Elbow Left 3 or More VWS; Future; Expected date: 03/04/2021  -     XR Elbow Right 3 or More VWS  -     XR Elbow Left 3 or More VWS  -     XR Elbow Right 3 or More VWS; Future; Expected date: 03/10/2021  -     XR Elbow Left 3 or More VWS; Future; Expected date: 03/10/2021  -     XR Elbow Right 3 or More VWS  -     XR Elbow Left 3 or More VWS    Cyclic citrullinated peptide (CCP) antibody positive    ESRD (end stage renal disease) on dialysis (H)        Follow up in 3 months      HISTORY OF PRESENTING ILLNESS:  Kevin Ellis 75 y.o. is evaluated here via phone link, early Wolof  was on line.  This patient has severe rheumatoid arthritis, longstanding, polyarticular, seropositive, and she is doing well at the rituximab infusions that she has had in the past most recently in July 2019.  Currently she is not on any margins.  She noted pain.  This is in her left elbow.  This is been moderately severe.  Her son bought her a infrared that she wonders might help her.  She wonders if a corticosteroid injection might be suitable this will be arranged after x-rays of the elbows are taken.  She reported no flareup of her joint symptoms.  She is aware  of the options for management of osteoarthritis and takes Tylenol for that.  She goes 3 times per week for hemodialysis for end-stage renal disease.  She noted no limitation in day-to-day activity.  She will stay the course.  We will meet here in 3 months or sooner.           ROS enquiry held for fever, ocular symptoms, rash, headache,  GI issues.  Today we also discussed the issues related to the current pandemic, the pros and cons of the current treatment plan, the CDC guidelines such as social distancing washing the hands covering the cough.  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, DO; gets dialysis at [facility]     Hypertension      Inverted nipple      Osteoporosis     severe     Ovarian cyst      Secondary renal hyperparathyroidism (H)      Seropositive rheumatoid arthritis (H)      Social History     Tobacco Use   Smoking Status Never Smoker   Smokeless Tobacco Never Used     Patient Active Problem List   Diagnosis     Seropositive rheumatoid arthritis of multiple sites (H)     High risk medication use     Anemia of chronic renal failure     Cyclic citrullinated peptide (CCP) antibody positive     ESRD (end stage renal disease) on dialysis (H)     Secondary renal hyperparathyroidism (H)     Ovarian cyst follow-up May 2020     Current Outpatient Medications   Medication Sig Dispense Refill     acetaminophen (TYLENOL) 500 MG tablet Take 500-1,000 mg by mouth every 4 (four) hours as needed.        calcitrioL (ROCALTROL) 0.5 MCG capsule Take 0.5 mcg by mouth daily.       calcium  carbonate (OS-AROLDO) 600 mg calcium (1,500 mg) tablet Take 600 mg by mouth daily.        lidocaine-prilocaine (EMLA) cream APPLY SMALL AMOUNT TO ACCESS SITE (AVF) 1 TO 2 HOURS BEFORE DIALYSIS. COVER WITH OCCLUSIVE DRESSING (SARAN WRAP)       oxyCODONE (ROXICODONE) 5 MG immediate release tablet Take 1 tablet (5 mg total) by mouth every 6 (six) hours as needed for pain. 12 tablet 0     psyllium (KONSYL) Powd 1 spoon full mix with water daily       sevelamer HCL (RENAGEL) 800 MG tablet Take 800 mg by mouth 3 (three) times a day with meals.        No current facility-administered medications for this visit.          EXAMINATION:     On the phone she sounded comfortable, alert, oriented, her speech was fluent.  Her memory recall appeared normal.  She did not sound like she was in pain or short of breath.      LAB / IMAGING DATA:  ALT   Date Value Ref Range Status   02/17/2021 <9 0 - 45 U/L Final   09/03/2020 10 0 - 45 U/L Final   11/29/2019 14 0 - 45 U/L Final     Albumin   Date Value Ref Range Status   02/17/2021 2.9 (L) 3.5 - 5.0 g/dL Final   02/01/2021 3.3 (L) 3.5 - 5.0 g/dL Final   09/03/2020 3.2 (L) 3.5 - 5.0 g/dL Final     Creatinine   Date Value Ref Range Status   02/17/2021 9.20 (HH) 0.60 - 1.10 mg/dL Final   02/01/2021 6.50 (HH) 0.60 - 1.10 mg/dL Final   09/03/2020 4.85 (H) 0.60 - 1.10 mg/dL Final       WBC   Date Value Ref Range Status   02/17/2021 9.0 4.0 - 11.0 thou/uL Final   02/01/2021 8.5 4.0 - 11.0 thou/uL Final     Hemoglobin   Date Value Ref Range Status   02/17/2021 9.0 (L) 12.0 - 16.0 g/dL Final   02/01/2021 11.1 (L) 12.0 - 16.0 g/dL Final   09/03/2020 11.1 (L) 12.0 - 16.0 g/dL Final     Platelets   Date Value Ref Range Status   02/17/2021 310 140 - 440 thou/uL Final   02/01/2021 303 140 - 440 thou/uL Final   09/03/2020 340 140 - 440 thou/uL Final       Lab Results   Component Value Date    RF 61.3 (H) 07/07/2017    SEDRATE 82 (H) 09/05/2017

## 2021-06-15 NOTE — ANESTHESIA PREPROCEDURE EVALUATION
Anesthesia Evaluation      Patient summary reviewed     Airway   Mallampati: II  Neck ROM: full   Pulmonary - negative ROS and normal exam    breath sounds clear to auscultation                         Cardiovascular - normal exam  (+) hypertension, ,     Rhythm: regular  Rate: normal,         Neuro/Psych - negative ROS     Endo/Other    (+) arthritis,      GI/Hepatic/Renal    (+)   chronic renal disease ESRD and dialysis,           Dental - normal exam                        Anesthesia Plan  Planned anesthetic: general endotracheal    ASA 3   Induction: intravenous   Anesthetic plan and risks discussed with: patient and child/children    Post-op plan: routine recovery

## 2021-06-15 NOTE — PROGRESS NOTES
This document was created using a software with less than 100% fidelity, at times resulting in unintended, even erroneous syntax and grammar.  The reader is advised to keep this under consideration while reviewing, interpreting this note.           ASSESSMENT AND PLAN:    Diagnoses and all orders for this visit:    Other secondary osteoarthritis of both elbows  -     triamcinolone acetonide 40 mg/mL injection 20 mg (KENALOG-40)    Seropositive rheumatoid arthritis of multiple sites (H)  -     triamcinolone acetonide 40 mg/mL injection 20 mg (KENALOG-40)    ESRD (end stage renal disease) on dialysis (H)             HISTORY OF PRESENTING ILLNESS:  Kevin Ellis 75 y.o. is here for follow-up, worsening pain in her right elbow, she has severe rheumatoid arthritis, and CCP antibody positive, recent x-rays have shown advanced degenerative changes in both elbows, it is the right side that troubles her, in the past corticosteroid injections have been helpful, she would like to repeat one, pros and cons outlined.  She has end-stage renal disease on peritoneal dialysis, ambulatory.    Following is the excerpt from a recent note:   evaluated here via phone link, early Latvian  was on line.  This patient has severe rheumatoid arthritis, longstanding, polyarticular, seropositive, and she is doing well at the rituximab infusions that she has had in the past most recently in July 2019.  Currently she is not on any margins.  She noted pain.  This is in her left elbow.  This is been moderately severe.  Her son bought her a infrared that she wonders might help her.  She wonders if a corticosteroid injection might be suitable this will be arranged after x-rays of the elbows are taken.  She reported no flareup of her joint symptoms.  She is aware of the options for management of osteoarthritis and takes Tylenol for that.  She goes 3 times per week for hemodialysis for end-stage renal disease.  She  noted no limitation in day-to-day activity.  She will stay the course.  We will meet here in 3 months or sooner.           ROS enquiry held for fever, ocular symptoms, rash, headache,  GI issues.  Today we also discussed the issues related to the current pandemic, the pros and cons of the current treatment plan, the CDC guidelines such as social distancing washing the hands covering the cough.  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, DO; gets dialysis at [facility]     Hypertension      Inverted nipple      Osteoporosis     severe     Ovarian cyst      Secondary renal hyperparathyroidism (H)      Seropositive rheumatoid arthritis (H)      Social History     Tobacco Use   Smoking Status Never Smoker   Smokeless Tobacco Never Used     Patient Active Problem List   Diagnosis     Seropositive rheumatoid arthritis of multiple sites (H)     High risk medication use     Anemia of chronic renal failure     Cyclic citrullinated peptide (CCP) antibody positive     ESRD (end stage renal disease) on dialysis (H)     Secondary renal hyperparathyroidism (H)     Ovarian cyst follow-up May 2020     Current Outpatient Medications   Medication Sig Dispense Refill     acetaminophen (TYLENOL) 500 MG tablet Take 500-1,000 mg by mouth every 4 (four) hours as needed.        calcitrioL (ROCALTROL) 0.5 MCG capsule Take 0.5 mcg by mouth daily.       calcium carbonate (OS-AROLDO) 600 mg calcium (1,500 mg) tablet Take 600 mg by mouth daily.        psyllium (KONSYL) Powd 1 spoon full mix with water daily       sevelamer  HCL (RENAGEL) 800 MG tablet Take 800 mg by mouth 3 (three) times a day with meals.        lidocaine-prilocaine (EMLA) cream APPLY SMALL AMOUNT TO ACCESS SITE (AVF) 1 TO 2 HOURS BEFORE DIALYSIS. COVER WITH OCCLUSIVE DRESSING (SARAN WRAP)       oxyCODONE (ROXICODONE) 5 MG immediate release tablet Take 1 tablet (5 mg total) by mouth every 6 (six) hours as needed for pain. 12 tablet 0     No current facility-administered medications for this visit.        DETAILED EXAMINATION  03/10/21  :  Vitals:    03/10/21 1615   BP: 104/58   Pulse: 78   Weight: 147 lb 1.6 oz (66.7 kg)     Alert oriented. Head including the face is examined for malar rash, heliotropes, scarring, lupus pernio. Eyes examined for redness such as in episcleritis/scleritis, periorbital lesions.   Neck/ Face examined for parotid gland swelling, range of motion of neck.  Left upper and lower and right upper and lower extremities examined for tenderness, swelling, warmth of the appendicular joints, range of motion, edema, rash.  Some of the important findings included: she does not have evidence of synovitis in the palpable joints of the upper extremities.  No significant deformities of the digits.  She has loss of hyperextension on the left elbow on the right side she has flexion contracture about 15 degrees, she has bony hypertrophy more so on the right side.        LAB / IMAGING DATA:  ALT   Date Value Ref Range Status   02/17/2021 <9 0 - 45 U/L Final   09/03/2020 10 0 - 45 U/L Final   11/29/2019 14 0 - 45 U/L Final     Albumin   Date Value Ref Range Status   02/17/2021 2.9 (L) 3.5 - 5.0 g/dL Final   02/01/2021 3.3 (L) 3.5 - 5.0 g/dL Final   09/03/2020 3.2 (L) 3.5 - 5.0 g/dL Final     Creatinine   Date Value Ref Range Status   02/17/2021 9.20 (HH) 0.60 - 1.10 mg/dL Final   02/01/2021 6.50 (HH) 0.60 - 1.10 mg/dL Final   09/03/2020 4.85 (H) 0.60 - 1.10 mg/dL Final       WBC   Date Value Ref Range Status   02/17/2021 9.0 4.0 - 11.0 thou/uL Final   02/01/2021  8.5 4.0 - 11.0 thou/uL Final     Hemoglobin   Date Value Ref Range Status   02/17/2021 9.0 (L) 12.0 - 16.0 g/dL Final   02/01/2021 11.1 (L) 12.0 - 16.0 g/dL Final   09/03/2020 11.1 (L) 12.0 - 16.0 g/dL Final     Platelets   Date Value Ref Range Status   02/17/2021 310 140 - 440 thou/uL Final   02/01/2021 303 140 - 440 thou/uL Final   09/03/2020 340 140 - 440 thou/uL Final       Lab Results   Component Value Date    RF 61.3 (H) 07/07/2017    SEDRATE 82 (H) 09/05/2017

## 2021-06-15 NOTE — TELEPHONE ENCOUNTER
Call pt via  line.     LVMTCB.     Please [facility] pt for xray of the elbow or give hospitals radiology # for pt to call and [facility] an appt     Please also [facility] pt to come in for an injection of the elbow, but first pt need to do xray before the injection.     Also please [facility] pt a f/u appt with Dr. Ochoa in 3 months.

## 2021-06-15 NOTE — ANESTHESIA CARE TRANSFER NOTE
Last vitals:   Vitals:    02/11/21 0727   BP: 119/57   Pulse: 79   Resp: 16   Temp:    SpO2: 97%     Patient's level of consciousness is drowsy  Spontaneous respirations: yes  Maintains airway independently: yes  Dentition unchanged: yes  Oropharynx: oropharynx clear of all foreign objects    QCDR Measures:  ASA# 20 - Surgical Safety Checklist: WHO surgical safety checklist completed prior to induction    PQRS# 430 - Adult PONV Prevention: 4558F - Pt received => 2 anti-emetic agents (different classes) preop & intraop  ASA# 8 - Peds PONV Prevention: NA - Not pediatric patient, not GA or 2 or more risk factors NOT present  PQRS# 424 - Rakel-op Temp Management: 4559F - At least one body temp DOCUMENTED => 35.5C or 95.9F within required timeframe  PQRS# 426 - PACU Transfer Protocol: - Transfer of care checklist used  ASA# 14 - Acute Post-op Pain: ASA14B - Patient did NOT experience pain >= 7 out of 10

## 2021-06-15 NOTE — PROGRESS NOTES
"ASSESSMENT AND PLAN:  Kevin Ellis 72 y.o. female has seropositive rheumatoid arthritis severe, on leflunomide.  She is complaining of increasing pain in the left knee.  She has osteoarthritis of the left knee 2.  There is no trauma.  This is discussed with her given the severity of symptoms, and relative acuteness, ultrasound of the left knee was done in the room.  This showed no effusion.  Given the severe renal impairment she is not a candidate for nonsteroidals.  I have offered her corticosteroid injections.  With the help of her son and her own understanding however able to communicate with regards of the pros and cons.  She wants to proceed.  40 mg of Kenalog injected anterolateral approach under aseptic technique.  She is to return for follow-up in 2 months.  Recent labs are reviewed.      Diagnoses and all orders for this visit:    Seropositive rheumatoid arthritis of multiple sites  -     triamcinolone acetonide 40 mg/mL injection 40 mg (KENALOG-40); Inject 1 mL (40 mg total) into the joint once.    Acute pain of left knee  -     triamcinolone acetonide 40 mg/mL injection 40 mg (KENALOG-40); Inject 1 mL (40 mg total) into the joint once.    High risk medication use    CRF (chronic renal failure), stage 3 (moderate)      HISTORY OF PRESENTING ILLNESS:  Kevin Ellis, 72 y.o., female is here for follow-up.  She has seropositive severe rheumatoid arthritis in the background of severe renal impairment.  She is complaining of increasing pain.  This is in the knee.  This is in the left side.  It started 3 days ago.  No history of trauma she has not observed any swelling.  She describes difficulty ambulating.  She rated the pain as \"10/10\".  She is in a wheelchair.  In view of her renal impairment she cannot take any nonsteroidals.  Rest of the joints is doing great.She noted pain level of 0/10 in all other joints.   She noted morning stiffness is 5 minutes.  There is no " fever or weight loss she has not had mouth ulcers no cough no rash. She has noted no family or personal history of psoriasis.  She is accompanied by her  and the family.   Further historical information and ADL limitations as noted in the multidimensional health assessment questionnaire attached in the EMR.  .     ALLERGIES:Ibuprofen    PAST MEDICAL/ACTIVE PROBLEMS/MEDICATION/ FAMILY HISTORY/SOCIAL DATA:  The patient has a family history of  Past Medical History:   Diagnosis Date     Arthritis      Hypertension      Inverted nipple      History   Smoking Status     Never Smoker   Smokeless Tobacco     Never Used     Patient Active Problem List   Diagnosis     Bilateral low back pain without sciatica     Chronic pain of both shoulders     Elevated sed rate     Seropositive rheumatoid arthritis of multiple sites     High risk medication use     CRF (chronic renal failure), stage 3 (moderate)     Current Outpatient Prescriptions   Medication Sig Dispense Refill     leflunomide (ARAVA) 10 MG tablet Take 1 tablet (10 mg total) by mouth daily. 30 tablet 1     senna-docusate (PERICOLACE) 8.6-50 mg tablet Take 1 tablet by mouth daily. 10 tablet 0     traMADol (ULTRAM) 50 mg tablet Take 1 tablet (50 mg total) by mouth every 6 (six) hours as needed for pain. 20 tablet 0     No current facility-administered medications for this visit.      DETAILED EXAMINATION  01/23/18  :  Vitals:    01/23/18 1619   Weight: 168 lb (76.2 kg)   Height: 5' (1.524 m)     Alert oriented. Head including the face is examined for malar rash, heliotropes, scarring, lupus pernio. Eyes examined for redness such as in episcleritis/scleritis, periorbital lesions.   Neck/ Face examined for parotid gland swelling, range of motion of neck.  Left upper and lower and right upper and lower extremities examined for tenderness, swelling, warmth of the appendicular joints, range of motion, edema, rash.  Some of the important findings included:  Accompanied by her son.  She has warmth and tenderness of the left knee.  Examined this with the ultrasound.  There is minimal effusion.  She has no other joints similarly affected indeed none of the other palpable appendicular joints show synovitis.              LAB / IMAGING DATA:  ALT   Date Value Ref Range Status   12/28/2017 11 0 - 45 U/L Final   11/09/2017 21 0 - 45 U/L Final   10/02/2017 18 0 - 45 U/L Final     Albumin   Date Value Ref Range Status   12/28/2017 2.9 (L) 3.5 - 5.0 g/dL Final   11/09/2017 3.2 (L) 3.5 - 5.0 g/dL Final   10/02/2017 2.6 (L) 3.5 - 5.0 g/dL Final     Creatinine   Date Value Ref Range Status   01/22/2018 3.58 (H) 0.60 - 1.10 mg/dL Final   12/28/2017 3.80 (H) 0.60 - 1.10 mg/dL Final   11/09/2017 3.07 (H) 0.60 - 1.10 mg/dL Final       WBC   Date Value Ref Range Status   01/22/2018 12.0 (H) 4.0 - 11.0 thou/uL Final   12/28/2017 11.9 (H) 4.0 - 11.0 thou/uL Final     Hemoglobin   Date Value Ref Range Status   01/22/2018 9.6 (L) 12.0 - 16.0 g/dL Final   12/28/2017 10.2 (L) 12.0 - 16.0 g/dL Final   11/09/2017 10.7 (L) 12.0 - 16.0 g/dL Final     Platelets   Date Value Ref Range Status   01/22/2018 332 140 - 440 thou/uL Final   12/28/2017 370 140 - 440 thou/uL Final   11/09/2017 306 140 - 440 thou/uL Final       Lab Results   Component Value Date    RF 61.3 (H) 07/07/2017    SEDRATE 82 (H) 09/05/2017

## 2021-06-16 NOTE — PROGRESS NOTES
Clinic Care Coordination Contact  [facility]/Voicemail       Clinical Data: Care Coordinator Outreach  Outreach attempted x 2.  Left message on patient's voicemail with call back information and requested return call.  Plan: Care Coordinator will send care coordination introduction letter with care coordinator contact information and explanation of care coordination services via [employer]. Care Coordinator will do no further outreaches at this time.

## 2021-06-16 NOTE — PROGRESS NOTES
Office Visit - Follow Up   Kevin Ellis   75 y.o. female    Date of Visit: 3/18/2021    Chief Complaint   Patient presents with     Chest Pain     3/10/21, 5 minutes following rheumatology injection     Mammogram     patient due, orders pending        Assessment and Plan   1. Acute chest pain  Atypical chest pain in a patient with known risk factors for coronary heart disease.  Will obtain a nuclear medicine stress test, pharmacologic.  Her EKG shows normal sinus rhythm without ischemic changes and this was personally reviewed and interpreted..  Chest x-ray looks okay  - Electrocardiogram Perform and Read  - NM MPI with Lexiscan; Future  - XR Chest 2 Views    3. Seropositive rheumatoid arthritis of multiple sites (H)  As with rheumatology, it is hard for me to relate her chest pain to the injection received in the right elbow    4. ESRD (end stage renal disease) on dialysis (H)  Continue with peritoneal dialysis      Return in about 4 weeks (around 4/15/2021) for recheck.     History of Present Illness   This 75 y.o. old woman comes in with her son today for evaluation of left-sided chest pain.  She saw otology on 3/10/2021 for an injection in her elbow.  She had pain at her elbow at the time of injection on the right side.  On the drive home she developed acute left-sided chest pain.  She has some associated shortness of breath.  The pain was fairly intense and went away after couple minutes.  She has intermittently had this chest pain since then.  Always on the right side.  Fairly intense and goes away in a few minutes.  She might have some associated shortness of breath.  No associated nausea or diaphoresis.  She had a dialysis catheter on that side and feels like the pain may be related to this.  She has no known coronary heart disease.  Pain does not radiate.  Nonexertional.  She is getting peritoneal dialysis at home.    Review of Systems: A comprehensive review of systems was negative  except as noted.     Medications, Allergies and Problem List   Reviewed, reconciled and updated  Post Discharge Medication Reconciliation Status:      Physical Exam   General Appearance:   No acute distress    /60 (Patient Site: Left Arm, Patient Position: Sitting, Cuff Size: Adult Regular)   Pulse 64   Ht 5' (1.524 m)   Wt 144 lb (65.3 kg)   LMP 05/05/1985   SpO2 98%   BMI 28.12 kg/m      HEENT exam is unremarkable  Cardiovascular regular rate and rhythm no murmur gallop or rub  Pulmonary lungs are clear to auscultation bilaterally  Neurologic exam is non focal  Psychiatric pleasant, no confusion or agitation   No tenderness palpation of the right chest wall       Additional Information   Current Outpatient Medications   Medication Sig Dispense Refill     acetaminophen (TYLENOL) 500 MG tablet Take 500-1,000 mg by mouth every 4 (four) hours as needed.        calcitrioL (ROCALTROL) 0.5 MCG capsule Take 0.5 mcg by mouth daily.       calcium carbonate (OS-AROLDO) 600 mg calcium (1,500 mg) tablet Take 600 mg by mouth daily.        lidocaine-prilocaine (EMLA) cream APPLY SMALL AMOUNT TO ACCESS SITE (AVF) 1 TO 2 HOURS BEFORE DIALYSIS. COVER WITH OCCLUSIVE DRESSING (SARAN WRAP)       oxyCODONE (ROXICODONE) 5 MG immediate release tablet Take 1 tablet (5 mg total) by mouth every 6 (six) hours as needed for pain. 12 tablet 0     psyllium (KONSYL) Powd 1 spoon full mix with water daily       sevelamer HCL (RENAGEL) 800 MG tablet Take 800 mg by mouth 3 (three) times a day with meals.        No current facility-administered medications for this visit.      Allergies   Allergen Reactions     Hydrochlorothiazide Other (See Comments)     Ibuprofen Other (See Comments)     Kidney disease     Social History     Tobacco Use     Smoking status: Never Smoker     Smokeless tobacco: Never Used   Substance Use Topics     Alcohol use: No     Frequency: Patient refused     Drug use: No     This is a patient with an acute illness,  chest pain which poses a threat to bodily function and/or life, I reviewed labs, x-ray and personally interpreted an EKG.  Also ordered a cardiovascular stress test with contrast.     Celso Maurice MD

## 2021-06-16 NOTE — TELEPHONE ENCOUNTER
Called patient and scheduled her 3 month follow up.     Patient commented during our phone call that she received an injection last week with Dr Ochoa and she says she experienced chest pain almost immediately after and it lasted the entire drive home. The chest pain went away after a while, but later that same day during her dialysis, the pain returned but it was less intense and lasted a shorter amount of time. She does not know if it was related to the injection, but after mentioning it to her Nephrologist, they suggested seeing cardiology. She is wondering what Dr Ochoa might want to weigh in on this. She says the pain is at an old incision site.     She also mentioned that she had an EKG done today for Dr Maurice, those results came back normal.     Transferred patient to [facility].

## 2021-06-16 NOTE — PROGRESS NOTES
Clinic Care Coordination Contact  [facility]/Voicemail       Clinical Data: Care Coordinator Outreach  Outreach attempted x 1.  Left message on patient's voicemail with call back information and requested return call.  Plan:  Care Coordinator will try to reach patient again in 1-2 business days.

## 2021-06-16 NOTE — TELEPHONE ENCOUNTER
Spoke to pt with language line - pt states after having elbow injection on 3/10 with Dr Ochoa, she had really bad chest pain. Pt states she does still get occasional chest pain off and on but it doesn't last as long as it did the first time. Pt did see PCP today and had an EKG and chest xray. Pt did mention this to her nephrologist when she was at dialysis as well. Dr Ochoa said chest pain is unlikely to be related to cortisone injection in elbow. Pt states the pain is in the exact spot where she used to have her dialysis port. Pt understands to follow up with PCP or go to ER if chest pain persists.    Pt also had questions on xrays and injections as someone had called her about this. I explained that she already had both elbows xrayed two weeks ago and pt states the right elbow is the one that causes her pain which is the one he had injected last week. Pt informed she would not need another right elbow injection for at least 3 months before Dr Ochoa could repeat it. Pt verbalized understanding.

## 2021-06-16 NOTE — TELEPHONE ENCOUNTER
Patient would also like to know if she should be getting an injection there is some confusion regarding this. Please call patient at [phone]

## 2021-06-16 NOTE — PROGRESS NOTES
Office Visit - Follow Up   Kevin Ellis   75 y.o. female    Date of Visit: 4/20/2021    Chief Complaint   Patient presents with     Inpatient Follow Up     4/9/21-4/11/21; WW;  Peritonitis         Assessment and Plan   1. ESRD (end stage renal disease) on dialysis (H)  Patient is doing peritoneal dialysis but does not seem to be tolerating.  I did discuss with Dr. Clifford Pedraza and he will discuss possible change to hemodialysis    2. Abdominal pain, epigastric  . Abdominal distention  Possible peritonitis during recent hospitalization with very mild elevation in white blood cell count with no growth culture.  She is not acutely ill today and I suspect she is just not tolerating peritoneal dialysis.    4. Anemia of chronic renal failure, stage 5 (H)  - vitamin B complex-folic acid (B COMPLEX 1, WITH FOLIC ACID,) 0.4 mg Tab; Take 0.4 mg by mouth daily.  Dispense: 90 tablet; Refill: 3    5. Visit for screening mammogram    Return in about 4 weeks (around 5/18/2021) for recheck.     History of Present Illness   This 75 y.o. old woman is seen because of intolerance of peritoneal dialysis.  She continues to feel some abdominal pain and distention and has increase in gas and stool.  She is unable to finish a complete peritoneal dialysis session.  She was recently admitted to [facility] with possible peritonitis and treated with antibiotics.  Her peritoneal fluid was no growth.    Review of Systems: A comprehensive review of systems was negative except as noted.     Medications, Allergies and Problem List   Reviewed, reconciled and updated  Post Discharge Medication Reconciliation Status: discharge medications reconciled, continue medications without change     Physical Exam   General Appearance:   No acute distress    /62 (Patient Site: Left Arm, Patient Position: Sitting, Cuff Size: Adult Regular)   Pulse 72   Ht 5' (1.524 m)   Wt 147 lb 9.6 oz (67 kg)   LMP 05/05/1985   SpO2 99%    BMI 28.83 kg/m      Abdomen is soft nontender     Additional Information   Current Outpatient Medications   Medication Sig Dispense Refill     acetaminophen (TYLENOL) 500 MG tablet Take 500-1,000 mg by mouth every 4 (four) hours as needed.        calcitrioL (ROCALTROL) 0.5 MCG capsule Take 0.5 mcg by mouth daily.       calcium carbonate (OS-AROLDO) 600 mg calcium (1,500 mg) tablet Take 600 mg by mouth daily.        sevelamer HCL (RENAGEL) 800 MG tablet Take 800 mg by mouth 3 (three) times a day with meals. May also take 1 tablet twice daily with snacks.       sevelamer HCL (RENAGEL) 800 MG tablet Take 800 mg by mouth 2 (two) times a day as needed. Twice daily with snacks in addition to scheduled tid       vitamin B complex-folic acid (B COMPLEX 1, WITH FOLIC ACID,) 0.4 mg Tab Take 0.4 mg by mouth daily. 90 tablet 3     No current facility-administered medications for this visit.      Allergies   Allergen Reactions     Hydrochlorothiazide Other (See Comments)     Ibuprofen Other (See Comments)     Kidney disease     Social History     Tobacco Use     Smoking status: Never Smoker     Smokeless tobacco: Never Used   Substance Use Topics     Alcohol use: No     Frequency: Patient refused     Drug use: No       Review and/or order of clinical lab tests:  Review and/or order of radiology tests:  Review and/or order of medicine tests:  Discussion of test results with performing physician:  Decision to obtain old records and/or obtain history from someone other than the patient:  Review and summarization of old records and/or obtaining history from someone other than the patient and.or discussion of case with another health care provider:  Independent visualization of image, tracing or specimen itself:    Time:      Celso Maurice MD

## 2021-06-16 NOTE — TELEPHONE ENCOUNTER
Caller  (via [place] interpretor) states that  She had  chest pain for 30 minutes  last week after receiving  steroid shot by rheumatologist. Did not seek medicial care but pain lasted  30 miutes; states she has had some chest pains prior to this that she has not had evaluated    triage protocl   Today she  Had a visit with her nephrologist and he   wants her to see cardiologist. Caller denies any  chest pain  in past week. Caller has not seen a cardiologist before an is contacting  [facility] today  to make an appointment.  s referred to FNA for triage.   Caller is advised that PCP will be messaged today  to review and  advise on cardiology referral or to be seen by PCP first.   Caller further advised to call  If she  develops any further chest pains or worsening symptoms.    Message will be routed to PCP and clinic staff for immediate action; please communicate with patient vis  Samantha.   Naye Reveles RN  FNA             Reason for Disposition    All other patients with chest pain    Additional Information    Negative: SEVERE difficulty breathing (e.g., struggling for each breath, speaks in single words)    Negative: Passed out (i.e., fainted, collapsed and was not responding)    Negative: Chest pain lasting longer than 5 minutes and ANY of the following:* Over 50 years old* Over 30 years old and at least one cardiac risk factor (i.e., high blood pressure, diabetes, high cholesterol, obesity, smoker or strong family history of heart disease)* Pain is crushing, pressure-like, or heavy * Took nitroglycerin and chest pain was not relieved* History of heart disease (i.e., angina, heart attack, bypass surgery, angioplasty, CHF)    Negative: Visible sweat on face or sweat dripping down face    Negative: Sounds like a life-threatening emergency to the triager    Negative: Followed an injury to chest    Negative: SEVERE chest pain    Negative: Pain also present in shoulder(s) or arm(s) or jaw    Negative:  Difficulty breathing    Negative: Cocaine use within last 3 days    Negative: History of prior 'blood clot' in leg or lungs (i.e., deep vein thrombosis, pulmonary embolism)    Negative: Recent illness requiring prolonged bed rest (i.e., immobilization)    Negative: Hip or leg fracture in past 2 months (e.g, or had cast on leg or ankle)    Negative: Major surgery in the past month    Negative: Recent long-distance travel with prolonged time in car, bus, plane, or train (i.e., within past 2 weeks; 6 or more hours duration)    Negative: Heart beating irregularly or very rapidly    Negative: Chest pain lasting longer than 5 minutes    Negative: Intermittent chest pain and pain has been increasing in severity or frequency    Negative: Dizziness or lightheadedness    Negative: Coughing up blood    Negative: Patient sounds very sick or weak to the triager    Negative: Fever > 100.5 F (38.1 C)    Negative: Intermittent chest pains persist > 3 days    Protocols used: CHEST PAIN-A-OH

## 2021-06-17 NOTE — PATIENT INSTRUCTIONS - HE
Patient Instructions by Zechariah Crawford RN at 7/30/2019  9:00 AM     Author: Zechariah Crawford RN Service: -- Author Type: Registered Nurse    Filed: 7/30/2019  8:55 AM Encounter Date: 7/30/2019 Status: Addendum    : Zechariah Crawford RN (Registered Nurse)    Related Notes: Original Note by Zechariah Crawford RN (Registered Nurse) filed at 7/30/2019  8:54 AM       Patient Education     PD Catheter Access: Placing the Catheter    Your kidneys filter and remove waste from your blood. When they fail, this work must be done some other way. Peritoneal dialysis (PD) is a treatment that can take over when your kidneys stop working. The peritoneum is the membrane lining the inside of your (belly) abdomen. PD uses the lining of your abdomen as a filter for your blood. Before PD can be done, an opening into this lining (an access) must be made. The access for PD is a soft tube called a catheter placed into your abdomen.  Placing the catheter    A nurse or anesthesiologist gives you medicine so you dont feel pain during surgery.    A small opening is made just below your belly button (navel). The catheter is placed through this opening.    One end of the catheter sits in your abdomen. A few inches of the other end comes out an exit site in your skin. This end is clamped off and capped when its not being used.    Typically, a part of the catheter goes through a tunnel made underneath the skin before it enters your abdomen. This tunnel helps prevent infections from entering your abdomen. It also holds the catheter in place to keep it from falling out.  Date Last Reviewed: 1/1/2017 2000-2017 [employer]. [address] [place] [address]. All rights reserved. This information is not intended as a substitute for professional medical care. Always follow your healthcare professional's instructions.         Procedure: peritoneal dialysis catheter placment        The surgery scheduler Peri Hayes at [phone]  will contact you to schedule if you were not scheduled today.     You will need a preop physical within 30 days before surgery with your primary care provider. Please note if you do not get a complete History and Physical your surgery will be cancelled.   Please contact your primary to schedule.     A nurse should contact you from the hospital 1-2 days before surgery to review with you.    If you would like a Good [place] Estimate for your upcoming service/procedure contact Cost of Care Estimates at [phone], advocates are available Monday through Friday 8am - 5pm. You may also submit a request online at [email or web address]    CPT code 24955  [facility]  [address]  [address]     [phone] for facility charge    Anesthesiology charge  [phone]          Please don't hesitate to call us with any additional question or problems  Our number is [phone]

## 2021-06-17 NOTE — TELEPHONE ENCOUNTER
FYI FOR PCP-  pt passed away today @ 11:25 am   Heme/Onc Progress Note    Patient Name: Doroteo Richard   YOB: 1944   Medical Record Number: [id number]   CSN: [id number]   Attending Physician: Saskia Fregoso M.D. Subjective:  Still coughing but resp status seems better.   Several young clear.   Neck: No JVD. No palpable lymphadenopathy. Neck is supple. Chest: Clearer to auscultation. Fewer rhonchi  Heart: Regular rate and rhythm. Abdomen: Moderately distended and mildly tender with bowel sounds. Ascites.   Extremities: Pedal pulses a 1.50 - 7.70 x10(3) uL    Lymphocyte Absolute 0.98 (L) 1.00 - 4.00 x10(3) uL    Monocyte Absolute 1.20 (H) 0.10 - 1.00 x10(3) uL    Eosinophil Absolute 0.01 0.00 - 0.70 x10(3) uL    Basophil Absolute 0.02 0.00 - 0.20 x10(3) uL    Immature Granulocyte Absolu Await opinions of other physicians involved in ehr care.     MD Mckenna Grey [facility]  [place] [id number] [facility], [place] [address]

## 2021-06-17 NOTE — PROGRESS NOTES
Preoperative Consultation   Kevin Ellis   75 y.o.  female    Date of visit: 5/3/2021  Physician: Celso Maurice MD    This is a preoperative consultation requested by Dr. Viveros in preparation for peritoneal catheter removal on 5/6/2021 at [facility].       Assessment and Plan   Kevin Ellis was seen in preoperative consultation in preparation for peritoneal catheter removal.  This is a low risk surgery and the patient has increased risk for major cardiac complications based on a history of creatinine >2mg/dl.  Please note, she is due for dialysis on the day of surgery.  Consider checking a potassium the morning of surgery.  No contraindication to proposed surgery.      1. Preoperative examination    2. Peritonitis (H)    3. Other specified complication of vascular prosthetic devices, implants and grafts, initial encounter (H)    4. ESRD (end stage renal disease) on dialysis (H)    5. Visit for screening mammogram    6. Mild protein-calorie malnutrition (H)    7. Seropositive rheumatoid arthritis of multiple sites (H)    8. Secondary renal hyperparathyroidism (H)    9. Other secondary hypertension    10. Anemia of chronic renal failure, stage 5 (H)         Patient Profile   Social History     Social History Narrative    She is from [place] [facility] and grew up in [place]. Lives with her sons, Timoteo and Leonel.          Past Medical History   Patient Active Problem List   Diagnosis     Seropositive rheumatoid arthritis of multiple sites (H)     High risk medication use     Anemia of chronic renal failure     Cyclic citrullinated peptide (CCP) antibody positive     ESRD (end stage renal disease) on dialysis (H)     Secondary renal hyperparathyroidism (H)     Ovarian cyst follow-up May 2020     Iron deficiency anemia     Other secondary hypertension     Other specified complication of vascular prosthetic devices, implants and grafts, initial encounter (H)      Renal osteodystrophy       Past Surgical History  She has a past surgical history that includes IR Tunneled Catheter Insert (2/15/2019); AV fistula placement (Left, 2/21/2019); Total hip arthroplasty (Left); AV fistula placement (Left, 4/5/2019); US Breast Cyst Aspiration Right (Right, 5/3/2019); Upper gastrointestinal endoscopy (09/05/2019); AV fistula placement (Left, 9/9/2019); pr lap insertion tunneled intraperitoneal catheter (N/A, 2/11/2021); and pr lap,fulgurate/excise lesions (Left, 2/11/2021).     History of Present Illness   This 75 y.o. old woman comes in for preoperative evaluation.  She has struggled with peritoneal dialysis.  She has had possible peritonitis.  She has had pain associated with the catheter and she is not able to tolerate a full peritoneal dialysis run.  She is therefore decided to switch back to hemodialysis.  She continues to have some pain about the dialysis catheter.  Otherwise feeling well.  No chest pain or shortness of breath.  No fever chills or infectious symptoms.  No known obstructive sleep apnea.  She is not currently on prednisone.  History of rheumatoid arthritis which has been calm.    Recent antiplatelet use: no  Personal or family history of bleeding or clotting disorders: no  Steroid use in the past year: No  Personal or family history of difficulty with anesthesia: no  Current cardiopulmonary symptoms: no    Review of Systems: A comprehensive review of systems was negative except as noted.     Medications and Allergies   Current Outpatient Medications   Medication Sig Dispense Refill     acetaminophen (TYLENOL) 500 MG tablet Take 500-1,000 mg by mouth every 4 (four) hours as needed.        calcitrioL (ROCALTROL) 0.5 MCG capsule Take 0.5 mcg by mouth daily.       sevelamer HCL (RENAGEL) 800 MG tablet Take 800 mg by mouth 3 (three) times a day with meals. May also take 1 tablet twice daily with snacks.       sevelamer HCL (RENAGEL) 800 MG tablet Take 800 mg by mouth 2  "(two) times a day as needed. Twice daily with snacks in addition to scheduled tid       vitamin B complex-folic acid (B COMPLEX 1, WITH FOLIC ACID,) 0.4 mg Tab Take 0.4 mg by mouth daily. 90 tablet 3     No current facility-administered medications for this visit.      Allergies   Allergen Reactions     Hydrochlorothiazide Other (See Comments)     Ibuprofen Other (See Comments)     Kidney disease        Family and Social History   Family History   Problem Relation Age of Onset     Sudden death Mother 75         in her sleep at home in [place], autopsy said \"heart attack\"     No Medical Problems Father      No Medical Problems Brother      No Medical Problems Sister      No Medical Problems Brother      No Medical Problems Son      No Medical Problems Daughter      No Medical Problems Daughter      No Medical Problems Daughter      No Medical Problems Daughter      No Medical Problems Son      No Medical Problems Son      No Medical Problems Son         Social History     Tobacco Use     Smoking status: Never Smoker     Smokeless tobacco: Never Used   Substance Use Topics     Alcohol use: No     Frequency: Patient refused     Drug use: No        Physical Exam   General Appearance:   No acute distress    /70 (Patient Site: Left Arm, Patient Position: Sitting, Cuff Size: Adult Regular)   Pulse 80   Ht 4' 10\" (1.473 m)   Wt 148 lb 5 oz (67.3 kg)   LMP 1985   SpO2 97%   BMI 31.00 kg/m      EYES: Eyelids, conjunctiva, and sclera were normal. Pupils were normal. Cornea, iris, and lens were normal bilaterally.  HEAD, EARS, NOSE, MOUTH, AND THROAT: Head and face were normal. Hearing was normal to voice and the ears were normal to external exam. Nose appearance was normal and there was no discharge. Oropharynx was normal.  NECK: Neck appearance was normal. There were no neck masses and the thyroid was not enlarged.  RESPIRATORY: Breathing pattern was normal and the chest moved symmetrically.  " Percussion/auscultatory percussion was normal.  Lung sounds were normal and there were no abnormal sounds.  CARDIOVASCULAR: Heart rate and rhythm were normal.  S1 and S2 were normal and there were no extra sounds or murmurs. Peripheral pulses in arms and legs were normal.  Thrombosed AV fistula in the left arm, dialysis catheter in the right chest wall.  Jugular venous pressure was normal.  There was no peripheral edema.  GASTROINTESTINAL: The abdomen was normal in contour.  Bowel sounds were present.  Percussion detected no organ enlargement or tenderness.  Palpation detected no tenderness, mass, or enlarged organs.   MUSCULOSKELETAL: Skeletal configuration was normal and muscle mass was normal for age. Joint appearance was overall normal.  LYMPHATIC: There were no enlarged nodes.  SKIN/HAIR/NAILS: Skin color was normal.  There were no skin lesions.  Hair and nails were normal.  NEUROLOGIC: The patient was alert and oriented to person, place, time, and circumstance. Speech was normal. Cranial nerves were normal. Motor strength was normal for age. The patient was normally coordinated.  Hyperreflexia bilateral upper and lower extremities with 3 beats of clonus in her ankles.  PSYCHIATRIC:  Mood and affect were normal and the patient had normal recent and remote memory. The patient's judgment and insight were normal.       Additional Tests   Laboratory: Reviewed    Radiology: Reviewed    Electrocardiogram: Sinus rhythm, personally reviewed    Total time 40 minutes including chart review, discussion with Dr. Clifford Pedraza, history, examination counseling and documentation     Celso Maurice MD  Internal Medicine  Contact me at [phone]

## 2021-06-17 NOTE — PROGRESS NOTES
ASSESSMENT AND PLAN:  Kevin Ellis 72 y.o. female is here for follow-up.  She has severe seropositive rheumatoid arthritis, renal failure grade for now.  She has osteoarthritis.  She is on leflunomide.  She has been complaining of diarrhea.  She was started on some other medications to and wonders which one of these might be the culprit.  She is going to hold leflunomide and see how the diarrhea goes.  If he is improved significantly she will then be a candidate for Biologics as many of the other options would not be suitable for her.  Certainly she will be able to take methotrexate.  She is complaining of bilateral shoulder pain and has features consistent with tendinopathy of the shoulders.  She wants to pursue local injection.  40 mg of methylprednisolone injected sub-acromial.  Physical therapy been arranged.  I will ask her to return for follow-up here in 2 months.  She is aware not to take nonsteroidals.       Diagnoses and all orders for this visit:    Seropositive rheumatoid arthritis of multiple sites  -     methylPREDNISolone acetate injection 40 mg (DEPO-MEDROL); Inject 1 mL (40 mg total) into the joint once.  -     methylPREDNISolone acetate injection 40 mg (DEPO-MEDROL); Inject 1 mL (40 mg total) into the joint once.  -     Ambulatory referral to PT/OT    High risk medication use    CRF (chronic renal failure), stage 3 (moderate)    Chronic pain of both shoulders  -     methylPREDNISolone acetate injection 40 mg (DEPO-MEDROL); Inject 1 mL (40 mg total) into the joint once.  -     methylPREDNISolone acetate injection 40 mg (DEPO-MEDROL); Inject 1 mL (40 mg total) into the joint once.    Tendonitis of both shoulders  -     methylPREDNISolone acetate injection 40 mg (DEPO-MEDROL); Inject 1 mL (40 mg total) into the joint once.  -     methylPREDNISolone acetate injection 40 mg (DEPO-MEDROL); Inject 1 mL (40 mg total) into the joint once.  -     Ambulatory referral to PT/OT      HISTORY  "OF PRESENTING ILLNESS:  Kevin Ellis, 72 y.o., female is here for follow-up.  She has seropositive severe rheumatoid arthritis in the background of severe renal impairment.  She is complaining of increasing pain.  This is in the shoulders bilaterally.   It started several weeks ago.  No history of trauma she has not observed any swelling.  She describes difficulty reaching, is woken up at night when she rolls on one side or the other,.  She rated the pain as \"10/10\".  She is accompanied by her son and ..  In view of her renal impairment she cannot take any nonsteroidals.  Rest of the joints is doing great.  She noted pain level of 0/10 in all other joints.   She noted morning stiffness is 5 minutes.  There is no fever or weight loss she has not had mouth ulcers no cough no rash. She has noted no family or personal history of psoriasis.  She is accompanied by her  and the family.   Further historical information and ADL limitations as noted in the multidimensional health assessment questionnaire attached in the EMR.  .     ALLERGIES:Ibuprofen    PAST MEDICAL/ACTIVE PROBLEMS/MEDICATION/ FAMILY HISTORY/SOCIAL DATA:  The patient has a family history of  Past Medical History:   Diagnosis Date     Arthritis      Hypertension      Inverted nipple      History   Smoking Status     Never Smoker   Smokeless Tobacco     Never Used     Patient Active Problem List   Diagnosis     Bilateral low back pain without sciatica     Chronic pain of both shoulders     Elevated sed rate     Seropositive rheumatoid arthritis of multiple sites     High risk medication use     CRF (chronic renal failure), stage 3 (moderate)     Acute pain of left knee     Current Outpatient Prescriptions   Medication Sig Dispense Refill     ferrous sulfate 324 mg (65 mg iron) TbEC Take 1 tablet by mouth once.  3     leflunomide (ARAVA) 10 MG tablet Take 1 tablet (10 mg total) by mouth daily. 30 tablet 1     " senna-docusate (PERICOLACE) 8.6-50 mg tablet Take 1 tablet by mouth daily. 10 tablet 0     traMADol (ULTRAM) 50 mg tablet Take 1 tablet (50 mg total) by mouth every 6 (six) hours as needed for pain. 20 tablet 0     No current facility-administered medications for this visit.      DETAILED EXAMINATION  05/10/18  :  Vitals:    05/10/18 1309   BP: 106/74   Patient Site: Right Arm   Patient Position: Sitting   Cuff Size: Adult Regular   Pulse: 68   Weight: 155 lb (70.3 kg)     Alert oriented. Head including the face is examined for malar rash, heliotropes, scarring, lupus pernio. Eyes examined for redness such as in episcleritis/scleritis, periorbital lesions.   Neck/ Face examined for parotid gland swelling, range of motion of neck.  Left upper and lower and right upper and lower extremities examined for tenderness, swelling, warmth of the appendicular joints, range of motion, edema, rash.  Some of the important findings included: She has bilateral shoulder impingement.  She has no synovitis in the palpable appendical joints of the upper extremities.  She has Heberden's.  She has mild JLT of the knees.     .              LAB / IMAGING DATA:  ALT   Date Value Ref Range Status   04/24/2018 <9 0 - 45 U/L Final   12/28/2017 11 0 - 45 U/L Final   11/09/2017 21 0 - 45 U/L Final     Albumin   Date Value Ref Range Status   04/24/2018 3.0 (L) 3.5 - 5.0 g/dL Final   12/28/2017 2.9 (L) 3.5 - 5.0 g/dL Final   11/09/2017 3.2 (L) 3.5 - 5.0 g/dL Final     Creatinine   Date Value Ref Range Status   04/24/2018 3.12 (H) 0.60 - 1.10 mg/dL Final   01/22/2018 3.58 (H) 0.60 - 1.10 mg/dL Final   12/28/2017 3.80 (H) 0.60 - 1.10 mg/dL Final       WBC   Date Value Ref Range Status   04/24/2018 10.3 4.0 - 11.0 thou/uL Final   01/22/2018 12.0 (H) 4.0 - 11.0 thou/uL Final     Hemoglobin   Date Value Ref Range Status   04/24/2018 10.2 (L) 12.0 - 16.0 g/dL Final   01/22/2018 9.6 (L) 12.0 - 16.0 g/dL Final   12/28/2017 10.2 (L) 12.0 - 16.0 g/dL Final      Platelets   Date Value Ref Range Status   04/24/2018 372 140 - 440 thou/uL Final   01/22/2018 332 140 - 440 thou/uL Final   12/28/2017 370 140 - 440 thou/uL Final       Lab Results   Component Value Date    RF 61.3 (H) 07/07/2017    SEDRATE 82 (H) 09/05/2017

## 2021-06-18 NOTE — PATIENT INSTRUCTIONS - HE
"Patient Instructions by Joe Mullins PT at 3/12/2020  1:30 PM     Author: Joe Mullins PT Service: -- Author Type: Physical Therapist    Filed: 3/12/2020  2:23 PM Encounter Date: 3/12/2020 Status: Signed    : Joe Mullins PT (Physical Therapist)        BRIDGING    While lying on your back, tighten your lower abdominals, squeeze your buttocks and then raise your buttocks off the floor/bed as creating a \"Bridge\" with your body.    Hold 5 seconds. 10x.  2x/day.        HIP ABDUCTION - SIDELYING    While lying on your side, slowly raise up your top leg to the side. Keep your knee straight and maintain your toes pointed forward the entire time.     The bottom leg can be bent to stabilize your body.    10-15x each leg as tolerated.  2x/day.      SINGLE KNEE TO CHEST STRETCH - SKTC    While Lying on your back,  hold your knee and gently pull it up towards your chest.    Hold 30 seconds, 2x each leg.  2x/day.           BUTTERFLY STRETCH - SUPINE    Lie on your back with knees bent and place the bottom of your feet together. Next, lower your knees to the side for a stretch to your inner thighs.   Hold 30 seconds. 2x.  2x/day.       "

## 2021-06-18 NOTE — PROGRESS NOTES
"[facility] Daily Progress     Patient Name: Kevin Ellis  Date: 2018  Visit #: 2  PTA visit #:  1  Referral Diagnosis: [unfilled]  Referring provider: Karly Bird, *  Visit Diagnosis:     ICD-10-CM    1. Tendonitis of both shoulders M75.81     M75.82    2. Acute pain of left knee M25.562    3. Chronic pain of both shoulders M25.511     G89.29     M25.512          Assessment:   Pt was seen today for her first follow up appointment. Pt has been compliant with her HEP Will use ice on her shoulders.  Pt presents with poor posture, very rounded shoulders and forward head.  Patient is benefitting from skilled physical therapy and is making steady progress toward functional goals.  Patient is appropriate to continue with skilled physical therapy intervention, as indicated by initial plan of care.    Goal Status:  Pt. will demonstrate/verbalize independence in self-management of condition in : 4 weeks  Pt. will be independent with home exercise program in : 4 weeks  Patient will demonstrate proper body mechanics : for lifting;for bending;with less pain;for chores;in 4 weeks  Patient will reach / maintain arm movement: forward;overhead;behind;for home chores;with less pain;in 4 weeks  No Data Recorded    Plan / Patient Education:     Continue with initial plan of care.  Progress with home program as tolerated.   Review HEP progress scapular stab exercise, posture education.   Re assess Kinesiotape. Consider UBE or Nustep    Subjective:   Pt reports she does not have shoulder pain today. Pt reports some L knee pain today.  Pt has been doing the exercises every day.  Pt asking about ice vs heat for her knee.  Pain Ratin L knee, 0/10 B shoulders        Objective:     Exercises:  Exercise #1: shoulder shrugs, circles.  Exercise #2:  rows and shoulder ext with L2 band  Comment #2: x10 each  Exercise #3: scapular retraction   Comment #3: 5\" x10    Treatment Today   "   TREATMENT MINUTES COMMENTS   Evaluation     Self-care/ Home management     Manual therapy     Neuromuscular Re-education     Therapeutic Activity     Therapeutic Exercises 15 See flow sheet  Added to HEP:  -rows and shoulder ext with L2 band   -scapular retraacion   Gait training     Modality_____ultrasound_____________ 16 8' each anterior and superior shoulders @1.2 W/cm2 100% duty cycle              Total 31    Blank areas are intentional and mean the treatment did not include these items.       Yulia Harris,CLT  5/23/2018

## 2021-06-18 NOTE — PROGRESS NOTES
[facility] Daily Progress     Patient Name: Kevin Ellis  Date: 2018  Visit #: 5  PTA visit #:  2      Date of evaluation: 2018  Referral Diagnosis: Seropositive rheumatoid arthritis of multiple sites  Referring provider: Cinthia Ochoa MBBS  Visit Diagnosis:     ICD-10-CM    1. Tendonitis of both shoulders M75.81     M75.82    2. Acute pain of left knee M25.562    3. Chronic pain of both shoulders M25.511     G89.29     M25.512          Assessment:     Decreased c/o pain. Posture improved.  Patient is independent with home program and was able to progress to UBE and wall slides exercises.    Patient is benefitting from skilled physical therapy and is making steady progress toward functional goals.  Patient is appropriate to continue with skilled physical therapy intervention, as indicated by initial plan of care.      Goal Status:  Pt. will demonstrate/verbalize independence in self-management of condition in : 4 weeks;Progressing toward  Pt. will be independent with home exercise program in : 4 weeks;Progressing toward  Patient will demonstrate proper body mechanics : for lifting;for bending;with less pain;for chores;in 4 weeks;Progressing toward  Patient will reach / maintain arm movement: forward;overhead;behind;for home chores;with less pain;in 4 weeks;Progressing toward      Plan / Patient Education:     Continue with initial plan of care.  Progress with home program as tolerated.   Review HEP progress scapular stab exercise, posture education.  Next visit is the last one.      Subjective:   Pt reports she is more aware of her posture    Pain Ratin/10 B shoulders    : Janae BRAMBILA        Objective:    Tender R lateral epicondyl and L extensor tendon.    R shoulder ROM:  Flexion: WNL  AB: WNL  IR: T7  ER:  65 degrees  L shoulder ROM  Flexion: WNL  AB: WNL  IR: T8  ER: 65 degrees    Exercises:  Exercise #1: shoulder shrugs, circles.  Exercise #2:  rows and  "shoulder ext with L2 band  Comment #2: x10 each  Exercise #3: scapular retraction   Comment #3: 5\" x10  Exercise #4: B shoulder ER L2 band   Comment #4: x8  Exercise #5: UBE : WL : 1, 3 minutes each direction  Comment #5: serratus wall slide.    Treatment Today     TREATMENT MINUTES COMMENTS   Evaluation     Self-care/ Home management  Review posture and body mechanics.   Manual therapy     Neuromuscular Re-education     Therapeutic Activity     Therapeutic Exercises 30 UBE WL 1  3' each way.  Serratus wall slide.  See flow sheet  Added to HEP:  -B shoulder ER  Objective measures taken   Gait training     Modality_____ultrasound_____________  Not today re assess next visit              Total 30    Blank areas are intentional and mean the treatment did not include these items.       KIRTI Delcid  6/6/2018      "

## 2021-06-18 NOTE — PROGRESS NOTES
[facility] Certification Request    May 18, 2018      Patient: Kevin Ellis  MR Number: [id number]  YOB: 1945  Date of Visit: 5/18/2018      Dear Cinthia Hidalgo MBBS:    Thank you for this referral.   We are seeing Kevin Ellis for Physical Therapy of bilateral shoulders tendonitis, knee pain, elbow pain.    Medicare and/or Medicaid requires physician review and approval of the treatment plan. Please review the plan of care and verify that you agree with the therapy plan of care by co-signing this note.      Plan of Care  Authorization / Certification Start Date: 05/18/18  Authorization / Certification End Date: 07/17/18  Authorization / Certification Number of Visits: 10  Communication with: Referral Source;Patient Caregiver  Patient Related Instruction: Nature of Condition;Treatment plan and rationale;Self Care instruction;Basis of treatment;Body mechanics;Posture;Precautions;Next steps;Expected outcome  Times per Week: 2  Number of Weeks: 5  Number of Visits: 10  Discharge Planning: patient will be discharge to self care.  Therapeutic Exercise: ROM;Stretching;Strengthening  Neuromuscular Reeducation: kinesio tape;posture  Manual Therapy: myofascial release  Modalities: ultrasound    Goals:  Pt. will demonstrate/verbalize independence in self-management of condition in : 4 weeks  Pt. will be independent with home exercise program in : 4 weeks  Patient will demonstrate proper body mechanics : for lifting;for bending;with less pain;for chores;in 4 weeks  Patient will reach / maintain arm movement: forward;overhead;behind;for home chores;with less pain;in 4 weeks        If you have any questions or concerns, please don't hesitate to call.    Sincerely,      Maureen Conteh, PT, CLT-TAWANA        Physician recommendation:     ___ Follow therapist's recommendation        ___ Modify therapy      *Physician co-signature indicates they certify the need  for these services furnished within this plan and while under their care.          [facility]   Shoulder Initial Evaluation    Patient Name: Kevin Ellis  Date of evaluation: 5/18/2018  Referral Diagnosis: Seropositive rheumatoid arthritis of multiple sites  Referring provider: Cinthia Ochoa MBBS  Visit Diagnosis:     ICD-10-CM    1. Tendonitis of both shoulders M75.81     M75.82        Assessment:       Kevin Ellis is a 72 y.o. female who presents to therapy today with chief complaints of bilateral shoulders pain, right elbow pain, right knee pain. Onset date of sx was 2 weeks ago fall going up the stairs.  Pt reported h/o bilateral shoulders injection and got better.  Pain symptoms are 1-8/10.  Functional impairments include lifting, sleeping, /hold, meal preparation, carry laundry/groceries..  Pt demo's signs and sx consistent with bilateral shoulders tendonitis, left knee pain, right elbow pain, s/p fall, poor posture.   Pt. is appropriate for skilled PT intervention as outlined in the Plan of Care (POC).  Pt. is a good candidate for skilled PT services to improve pain levels and function.    : Melia    Goals:  Pt. will demonstrate/verbalize independence in self-management of condition in : 4 weeks  Pt. will be independent with home exercise program in : 4 weeks  Patient will demonstrate proper body mechanics : for lifting;for bending;with less pain;for chores;in 4 weeks  Patient will reach / maintain arm movement: forward;overhead;behind;for home chores;with less pain;in 4 weeks      Patient's expectations/goals are realistic.  Patient's goal: more health and flexibility.  Barriers to Learning or Achieving Goals:  Language barriers.       Plan / Patient Instructions:        Plan of Care:   Authorization / Certification Start Date: 05/18/18  Authorization / Certification End Date: 07/17/18  Authorization / Certification Number of Visits:  10  Communication with: Referral Source;Patient Caregiver  Patient Related Instruction: Nature of Condition;Treatment plan and rationale;Self Care instruction;Basis of treatment;Body mechanics;Posture;Precautions;Next steps;Expected outcome  Times per Week: 2  Number of Weeks: 5  Number of Visits: 10  Discharge Planning: patient will be discharge to self care.  Therapeutic Exercise: ROM;Stretching;Strengthening  Neuromuscular Reeducation: kinesio tape;posture  Manual Therapy: myofascial release  Modalities: ultrasound    POC and pathology of condition were reviewed with patient.  Pt. is in agreement with the Plan of Care  A Home Exercise Program (HEP) was initiated today.  Pt. was instructed in exercises by PT and patient was given a handout with detailed instructions.  Plan for next visit: ultrasound as needed, strengthening/stretching exercises, kinesiotape, HEP.  .   Subjective:       Social information:   Living Situation:single family home and lives with others    Occupation:homemaker   Work Status:NA   Equipment Available: None    History of Present Illness:   Knee pain, right elbow pain  Kevin is a 72 y.o. female who presents to therapy today with complaints of bilateral shoulders pain, left. Date of onset/duration of symptoms is 2 weeks ago after fall. Onset was sudden. Symptoms are constant and not improving. She denies history of similar symptoms. She describes their previous level of function as not limited    Pain Ratin  Pain rating at best: 0  Pain rating at worst: 8  Pain description: aching    Functional limitations are described as occurring with:   lifting  personal cares dressing lower body and donning shirt or jacket  reaching overhead    Patient reports benefit from:  rest         Objective:      Note: Items left blank indicates the item was not performed or not indicated at the time of the evaluation.     Left knee with some skin bruising from fall.    Shoulder Examination  1.  Tendonitis of both shoulders       Involved side: Bilateral  Posture Observation:      General sitting posture is  fair.  General standing posture is fair.  Cervical:  Moderate forward head  Shoulder/Thoracic complex: Moderate bilateral scapular winging  Cervical Clearing: Abnormal flexed forward.    Shoulder/Elbow ROM    Date: 5-18-18     Shoulder and Elbow ROM ( )   AROM in degrees AROM in degrees AROM in degrees    Right Left Right Left Right Left   Shoulder Flexion (0-180 ) 180o pain 180opain       Shoulder Abduction (0-180 ) 180o 180o       Shoulder Extension (0-60 ) 40o 40o       Shoulder ER (0-90 ) 45o 50o       Shoulder IR (0-70 ) 50o 45o       Shoulder IR/Ext         Elbow Flexion (150 )         Elbow Extension (0 )          PROM in degrees PROM in degrees PROM in degrees    Right Left Right Left Right Left   Shoulder Flexion (0-180 )         Shoulder Abduction (0-180 )         Shoulder Extension (0-60 )         Shoulder ER (0-90 )         Shoulder IR (0-70 )         Elbow Flexion (150 )         Elbow Extension (0 )           Shoulder/Elbow Strength   Date: 5-18-18     Shoulder/Elbow Strength (/5)  Manual Muscle Test (MMT) MMT MMT MMT    Right Left Right Left Right Left   Shoulder Flexion 4/5 4/5       Supraspinatus 4/5 4/5       Shoulder Abduction         Shoulder Extension         Shoulder External Rotation         Shoulder Internal Rotation         Elbow Flexion         Elbow Extension         Other:         Other:           Flexibility: limited by pain at end range.    Palpation: bilateral bicipital area tenderness.    Joint Assessment:  Sternoclavicular Joint Assessment: WNL  Acromioclavicular Joint Assessment: WNL  Scapulothoracic Joint Assessment: Hypomobile.  Glenohumeral Joint Assessment:WNL.        Treatment Today    TREATMENT MINUTES COMMENTS   Evaluation 30 low   Self-care/ Home management 10 Instructed on posture and body mechanics.  kinesiotape AC to bilateral shoulders, instructed to remove  if any itching or skin redness.   Manual therapy     Neuromuscular Re-education     Therapeutic Activity     Therapeutic Exercises 4 Exercises:  Exercise #1: shoulder shrugs, circles.     Gait training     Modality______ultrasound____________ 16 Bilateral shoulders: 1.2 watt x cm2, 100%, good skin tolerance.              Total 60    Blank areas are intentional and mean the treatment did not include these items.       PT Evaluation Code: (Please list factors)  Patient History/Comorbidities: poor posture.  Examination: bilateral shoulder pain, knee pain elbow pain.  Clinical Presentation: stable.  Clinical Decision Making: low.    Patient History/  Comorbidities Examination  (body structures and functions, activity limitations, and/or participation restrictions) Clinical Presentation Clinical Decision Making (Complexity)   No documented Comorbidities or personal factors 1-2 Elements Stable and/or uncomplicated Low   1-2 documented comorbidities or personal factor 3 Elements Evolving clinical presentation with changing characteristics Moderate   3-4 documented comorbidities or personal factors 4 or more Unstable and unpredictable High            Maureen Conteh PT  5/18/2018  2:30 PM

## 2021-06-18 NOTE — PROGRESS NOTES
"[facility] Daily Progress     Patient Name: Kevin Ellis  Date: 2018  Visit #: 4  PTA visit #:  2      Date of evaluation: 2018  Referral Diagnosis: Seropositive rheumatoid arthritis of multiple sites  Referring provider: Cinthia Ochao MBBS  Visit Diagnosis:     ICD-10-CM    1. Tendonitis of both shoulders M75.81     M75.82    2. Acute pain of left knee M25.562    3. Chronic pain of both shoulders M25.511     G89.29     M25.512          Assessment:   Pt c/o R elbow pain today. This is a chronic issue per pt.  Posture is improving.    Patient is benefitting from skilled physical therapy and is making steady progress toward functional goals.  Patient is appropriate to continue with skilled physical therapy intervention, as indicated by initial plan of care.      Goal Status:  Pt. will demonstrate/verbalize independence in self-management of condition in : 4 weeks;Progressing toward  Pt. will be independent with home exercise program in : 4 weeks;Progressing toward  Patient will demonstrate proper body mechanics : for lifting;for bending;with less pain;for chores;in 4 weeks;Progressing toward  Patient will reach / maintain arm movement: forward;overhead;behind;for home chores;with less pain;in 4 weeks;Progressing toward  No Data Recorded    Plan / Patient Education:     Continue with initial plan of care.  Progress with home program as tolerated.   Review HEP progress scapular stab exercise, posture education.   Re assess need for US    Subjective:   Pt reports she noticed that if she sits with poor posture \"everything starts to hurt.\"  Pt reports compliancy with her HEP.  Pt feels that the KT has been helpful.  Pt reports she has R elbow pain. \"I've had it for years.\"    Pain Ratin/10 B shoulders        Objective:    Tender R lateral epicondyl and L extensor tendon.    R shoulder ROM:  Flexion: WNL  AB: WNL  IR: T7  ER:  65 degrees  L shoulder ROM  Flexion: WNL  AB: " "WNL  IR: T8  ER: 65 degrees    Exercises:  Exercise #1: shoulder shrugs, circles.  Exercise #2:  rows and shoulder ext with L2 band  Comment #2: x10 each  Exercise #3: scapular retraction   Comment #3: 5\" x10  Exercise #4: B shoulder ER L2 band   Comment #4: x8    Treatment Today     TREATMENT MINUTES COMMENTS   Evaluation     Self-care/ Home management     Manual therapy     Neuromuscular Re-education 5 KT to B upper traps and AC joints   Therapeutic Activity     Therapeutic Exercises 25 UBE WL 1.2  2' each way  See flow sheet  Added to HEP:  -B shoulder ER  Objective measures taken   Gait training     Modality_____ultrasound_____________  Not today re assess next visit              Total 30    Blank areas are intentional and mean the treatment did not include these items.       Yulia Harris,CHELSEA  5/30/2018      "

## 2021-06-18 NOTE — PROGRESS NOTES
"Pt arrived ambulatory and was seated in chair 1; she is accompanied by her son - \"we declined an .\" Reviewed plan of care and educated pt on the process of receiving feraheme and potential side effects related to this medication. Placed the IV and used NS as the primary IV solution - infused the first dose of feraheme over 15 minutes, and flushed with NS following (using a total volume of 250 mls NS). VSS. Ate 40% of lunch. Post infusion AVS was given and explained to the patient (and her son). Left unit ambulatory in stable condition at 13:36, and plans to return in one week. Instructed to call with any questions or concerns, and was given the phone number for Dr. Palacios or the [facility].     Breanne Balderas RN  "

## 2021-06-18 NOTE — PROGRESS NOTES
Patient arrived ambulatory for second dose of ferumoxytol. IV access obtained and patient tolerated infusion with no complaints/no signs of adverse reaction. Patient monitored x30 minutes after infusion completed. IV removed prior to discharge to home. Lab results reviewed and faxed to Dr. Palacios for review. Patient discharged ambulatory to home with son as . IV removed from site prior to discharge.  at chairside throughout visit.

## 2021-06-18 NOTE — PROGRESS NOTES
"[facility] Daily Progress     Patient Name: Kevin Ellis  Date: 2018  Visit #: 3  PTA visit #:  1      Date of evaluation: 2018  Referral Diagnosis: Seropositive rheumatoid arthritis of multiple sites  Referring provider: Cinthia Ochoa MBBS  Visit Diagnosis:     ICD-10-CM    1. Tendonitis of both shoulders M75.81     M75.82    2. Acute pain of left knee M25.562    3. Chronic pain of both shoulders M25.511     G89.29     M25.512          Assessment:     Good skin tolerance to kinesio tape.  Posture is improving.    Patient is benefitting from skilled physical therapy and is making steady progress toward functional goals.  Patient is appropriate to continue with skilled physical therapy intervention, as indicated by initial plan of care.  : Jose  Goal Status:  Pt. will demonstrate/verbalize independence in self-management of condition in : 4 weeks;Progressing toward  Pt. will be independent with home exercise program in : 4 weeks;Progressing toward  Patient will demonstrate proper body mechanics : for lifting;for bending;with less pain;for chores;in 4 weeks;Progressing toward  Patient will reach / maintain arm movement: forward;overhead;behind;for home chores;with less pain;in 4 weeks;Progressing toward  No Data Recorded    Plan / Patient Education:     Continue with initial plan of care.  Progress with home program as tolerated.   Review HEP progress scapular stab exercise, posture education.   Re assess Kinesiotape. Consider UBE or Nustep    Subjective:   Pt reports she does not have shoulder pain today.  Pt has been doing the exercises every day.    Pain Ratin/10 B shoulders        Objective:     Exercises:  Exercise #1: shoulder shrugs, circles.  Exercise #2:  rows and shoulder ext with L2 band  Comment #2: x10 each  Exercise #3: scapular retraction   Comment #3: 5\" x10    Treatment Today     TREATMENT MINUTES COMMENTS   Evaluation   "   Self-care/ Home management     Manual therapy     Neuromuscular Re-education     Therapeutic Activity     Therapeutic Exercises 14 See flow sheet  Added to HEP:  -rows and shoulder ext with L2 band   -scapular retraacion   Gait training     Modality_____ultrasound_____________ 16 8' each anterior and superior shoulders @1.2 W/cm2, 100% duty cycle, good skin tolerance.              Total 30    Blank areas are intentional and mean the treatment did not include these items.       Maureen Marroquin,CHELSEA-[place]NA  5/24/2018

## 2021-06-19 NOTE — LETTER
"Letter by Alexander Jiménez MD at      Author: Alexander Jiménez MD Service: -- Author Type: --    Filed:  Encounter Date: 8/20/2019 Status: (Other)       Kevin Ellis  1945      Dear Trinidad:    Your bone density test did return in the osteoporotic range as expected.  I have discussed therapeutic options with your nephrologist, Dr. Wolf.  He would be reluctant to use a medication such as Prolia since he is concerned about \"frozen bone\".  Labs done to evaluate for secondary causes of low bone density were notable for an elevated parathyroid hormone level.  This is common with chronic renal failure.  Dr. Wolf will use a medication with dialysis to keep this from becoming a bigger problem.  Screening tests for gluten sensitive enteropathy were mildly abnormal.  I will discuss the significance of this with Dr. Maurice.    It was nice to meet you.    Alexander Jiménez MD       "

## 2021-06-19 NOTE — PROGRESS NOTES
ASSESSMENT AND PLAN:  Kevin Ellis 73 y.o. female is here for follow-up.  She has severe seropositive rheumatoid arthritis, renal failure.  She has osteoarthritis.  She has chronic elevation of sed rate going on for the past 3+ years.  She reports ongoing pain.  This especially the case in her knees.  She is demonstrating worsening kidney function.  The leflunomide to be discontinued.  Starting her on prednisone.  Meanwhile start her on tumor necrosis factor inhibitors.  The knee pain may well be in association with osteoarthritis.  She would like to pursue local injections.  40 mg of Kenalog injected anterolateral approach with Marcaine after pros and cons were outlined.  The .  She is to return for follow-up here in 2 months.    Diagnoses and all orders for this visit:    Seropositive rheumatoid arthritis of multiple sites (H)  -     Discontinue: predniSONE (DELTASONE) 10 mg tablet; Take 1 tablet (10 mg total) by mouth daily.  Dispense: 30 tablet; Refill: 1  -     QTF-Mycobacterium tuberculosis by QuantiFERON-TB Gold Plus  -     Hepatitis B Surface Antigen (HBsAG)  -     predniSONE (DELTASONE) 10 mg tablet; Take 1 tablet (10 mg total) by mouth daily.  Dispense: 30 tablet; Refill: 1  -     triamcinolone acetonide 40 mg/mL injection 40 mg (KENALOG-40); Inject 1 mL (40 mg total) into the joint once.  -     triamcinolone acetonide 40 mg/mL injection 40 mg (KENALOG-40); Inject 1 mL (40 mg total) into the joint once.    CRF (chronic renal failure), stage 3 (moderate)    Cyclic citrullinated peptide (CCP) antibody positive    High risk medication use      HISTORY OF PRESENTING ILLNESS:  Kevin Ellis, 73 y.o., female is here for follow-up.  She has seropositive severe rheumatoid arthritis in the background of severe renal impairment.  She is complaining of increasing pain.  This is in her knees.  This is worse with activity.  She rates as moderately severe.  She wonders  if there is been swelling.  There is none today.  There is no history of fall.  She is also had neck discomfort which is not as bad as was previously.  Difficult time ambulating.    X-rays of the neck were taken on her previous visit.  That does not seem to be significant instability of her neck.She is also noted elbow pain on the right side.  Difficulty performing several day-to-day activities.  She has noted neck pain.  This radiates toward the occipital area.  She is stiff in the morning for 2 hours.  There is no jaw claudication there is no double vision.  Her sed rate has been chronically elevated as were back as May 2015.  Serum protein electrophoresis will be checked today.  There is no fever or weight loss she has not had mouth ulcers no cough no rash. She has noted no family or personal history of psoriasis.  She is accompanied by her  and the family.   Further historical information and ADL limitations as noted in the multidimensional health assessment questionnaire attached in the EMR.  .     ALLERGIES:Ibuprofen    PAST MEDICAL/ACTIVE PROBLEMS/MEDICATION/ FAMILY HISTORY/SOCIAL DATA:  The patient has a family history of  Past Medical History:   Diagnosis Date     Arthritis      Hypertension      Inverted nipple      History   Smoking Status     Never Smoker   Smokeless Tobacco     Never Used     Patient Active Problem List   Diagnosis     Bilateral low back pain without sciatica     Chronic pain of both shoulders     Elevated sed rate     Seropositive rheumatoid arthritis of multiple sites (H)     High risk medication use     CRF (chronic renal failure), stage 3 (moderate)     Acute pain of left knee     Tendonitis of both shoulders     Anemia of chronic renal failure     Cyclic citrullinated peptide (CCP) antibody positive     Current Outpatient Prescriptions   Medication Sig Dispense Refill     ferrous sulfate 324 mg (65 mg iron) TbEC Take 1 tablet by mouth once.  3     folic acid (FOLVITE) 1  MG tablet Take 1 mg by mouth daily.  11     predniSONE (DELTASONE) 10 mg tablet Take 1 tablet (10 mg total) by mouth daily. 30 tablet 1     No current facility-administered medications for this visit.      DETAILED EXAMINATION  07/16/18  :  Vitals:    07/16/18 1115   BP: 100/64   Patient Site: Right Arm   Patient Position: Sitting   Cuff Size: Adult Regular   Pulse: 84   Weight: 150 lb (68 kg)     Alert oriented. Head including the face is examined for malar rash, heliotropes, scarring, lupus pernio. Eyes examined for redness such as in episcleritis/scleritis, periorbital lesions.   Neck/ Face examined for parotid gland swelling, range of motion of neck.  Left upper and lower and right upper and lower extremities examined for tenderness, swelling, warmth of the appendicular joints, range of motion, edema, rash.  Some of the important findings included: She has bilateral shoulder impingement.  She has tenderness in her wrists, no swelling in any of the palpable appendical or joints.  She has a marked Heberden nodes.  She does not have superficial temporal arteries tenderness, thickening.. She has marked tenderness of the joint lines of the knees bilaterally.              LAB / IMAGING DATA:  ALT   Date Value Ref Range Status   07/03/2018 <9 0 - 45 U/L Final   04/24/2018 <9 0 - 45 U/L Final   12/28/2017 11 0 - 45 U/L Final     Albumin   Date Value Ref Range Status   07/03/2018 3.1 (L) 3.5 - 5.0 g/dL Final   04/24/2018 3.0 (L) 3.5 - 5.0 g/dL Final   12/28/2017 2.9 (L) 3.5 - 5.0 g/dL Final     Creatinine   Date Value Ref Range Status   07/03/2018 3.49 (H) 0.60 - 1.10 mg/dL Final   04/24/2018 3.12 (H) 0.60 - 1.10 mg/dL Final   01/22/2018 3.58 (H) 0.60 - 1.10 mg/dL Final       WBC   Date Value Ref Range Status   07/03/2018 10.5 4.0 - 11.0 thou/uL Final   06/11/2018 12.2 (H) 4.0 - 11.0 thou/uL Final     Hemoglobin   Date Value Ref Range Status   07/03/2018 11.2 (L) 12.0 - 16.0 g/dL Final   06/11/2018 9.8 (L) 12.0 - 16.0  g/dL Final   04/24/2018 10.2 (L) 12.0 - 16.0 g/dL Final     Platelets   Date Value Ref Range Status   07/03/2018 309 140 - 440 thou/uL Final   06/11/2018 329 140 - 440 thou/uL Final   04/24/2018 372 140 - 440 thou/uL Final       Lab Results   Component Value Date    RF 61.3 (H) 07/07/2017    SEDRATE 82 (H) 09/05/2017

## 2021-06-19 NOTE — PROGRESS NOTES
ASSESSMENT AND PLAN:  Kvein Ellis 73 y.o. female is here for follow-up.  She has severe seropositive rheumatoid arthritis, renal failure.  She has osteoarthritis.  She has chronic elevation of sed rate going on for the past 3+ years.  She is hurting in her cervical spine.  X-rays of the neck to be taken.  She feels that her joint pains are not responding to leflunomide 10 mg.  Further workup as noted besides Bactrim, increase leflunomide to 20 mg daily.  Check labs today.  Return for follow-up in 2 months.  In the interim low-dose prednisone is prescribed major side effects number ocular metabolic bone related discussed.       Diagnoses and all orders for this visit:    Seropositive rheumatoid arthritis of multiple sites (H)  -     XR Cervical Spine Flexion Extension 2-3; Future; Expected date: 7/3/18  -     [facility] Misc. Lab Test  -     XR Cervical Spine Flexion Extension 2-3  -     leflunomide (ARAVA) 10 MG tablet; Take 2 tablets (20 mg total) by mouth daily.  Dispense: 30 tablet; Refill: 0  -     predniSONE (DELTASONE) 2.5 MG tablet; Take 7.5 mg/d prednisone PO for 1 month, reduce to 5 mg/d for the next month, and then reduce to 2.5 mg/d for the third month and then stop.  Dispense: 90 tablet; Refill: 2    Cyclic citrullinated peptide (CCP) antibody positive  -     XR Cervical Spine Flexion Extension 2-3; Future; Expected date: 7/3/18  -     [facility] Misc[facility] Lab Test  -     XR Cervical Spine Flexion Extension 2-3    Elevated sed rate  -     Electrophoresis, Protein, Serum    CRF (chronic renal failure), stage 3 (moderate)      HISTORY OF PRESENTING ILLNESS:  Kevin Ellis, 73 y.o., female is here for follow-up.  She has seropositive severe rheumatoid arthritis in the background of severe renal impairment.  She is complaining of increasing pain.  This is in her wrists, neck.  She reports is going over the past few weeks.  She rates it at 8.5/10.  Worse on the left  side.  She has difficult time moving her wrists.  She is also noted elbow pain on the right side.  Difficulty performing several day-to-day activities.  She has noted neck pain.  This radiates toward the occipital area.  She is stiff in the morning for 2 hours.  There is no jaw claudication there is no double vision.  Her sed rate has been chronically elevated as were back as May 2015.  Serum protein electrophoresis will be checked today.  There is no fever or weight loss she has not had mouth ulcers no cough no rash. She has noted no family or personal history of psoriasis.  She is accompanied by her  and the family.   Further historical information and ADL limitations as noted in the multidimensional health assessment questionnaire attached in the EMR.  .     ALLERGIES:Ibuprofen    PAST MEDICAL/ACTIVE PROBLEMS/MEDICATION/ FAMILY HISTORY/SOCIAL DATA:  The patient has a family history of  Past Medical History:   Diagnosis Date     Arthritis      Hypertension      Inverted nipple      History   Smoking Status     Never Smoker   Smokeless Tobacco     Never Used     Patient Active Problem List   Diagnosis     Bilateral low back pain without sciatica     Chronic pain of both shoulders     Elevated sed rate     Seropositive rheumatoid arthritis of multiple sites (H)     High risk medication use     CRF (chronic renal failure), stage 3 (moderate)     Acute pain of left knee     Tendonitis of both shoulders     Anemia of chronic renal failure     Cyclic citrullinated peptide (CCP) antibody positive     Current Outpatient Prescriptions   Medication Sig Dispense Refill     ferrous sulfate 324 mg (65 mg iron) TbEC Take 1 tablet by mouth once.  3     folic acid (FOLVITE) 1 MG tablet Take 1 mg by mouth daily.  11     leflunomide (ARAVA) 10 MG tablet Take 2 tablets (20 mg total) by mouth daily. 30 tablet 0     No current facility-administered medications for this visit.      DETAILED EXAMINATION  07/03/18  :  Vitals:     07/03/18 1442   BP: 110/74   Weight: 150 lb 12.8 oz (68.4 kg)   Height: 5' (1.524 m)     Alert oriented. Head including the face is examined for malar rash, heliotropes, scarring, lupus pernio. Eyes examined for redness such as in episcleritis/scleritis, periorbital lesions.   Neck/ Face examined for parotid gland swelling, range of motion of neck.  Left upper and lower and right upper and lower extremities examined for tenderness, swelling, warmth of the appendicular joints, range of motion, edema, rash.  Some of the important findings included: She has bilateral shoulder impingement.  She has tenderness in her wrists, no swelling in any of the palpable appendical or joints.  She has a marked [place]ber[place] nodes.  She does not have superficial temporal arteries tenderness, thickening..              LAB / IMAGING DATA:  ALT   Date Value Ref Range Status   04/24/2018 <9 0 - 45 U/L Final   12/28/2017 11 0 - 45 U/L Final   11/09/2017 21 0 - 45 U/L Final     Albumin   Date Value Ref Range Status   04/24/2018 3.0 (L) 3.5 - 5.0 g/dL Final   12/28/2017 2.9 (L) 3.5 - 5.0 g/dL Final   11/09/2017 3.2 (L) 3.5 - 5.0 g/dL Final     Creatinine   Date Value Ref Range Status   04/24/2018 3.12 (H) 0.60 - 1.10 mg/dL Final   01/22/2018 3.58 (H) 0.60 - 1.10 mg/dL Final   12/28/2017 3.80 (H) 0.60 - 1.10 mg/dL Final       WBC   Date Value Ref Range Status   06/11/2018 12.2 (H) 4.0 - 11.0 thou/uL Final   04/24/2018 10.3 4.0 - 11.0 thou/uL Final     Hemoglobin   Date Value Ref Range Status   06/11/2018 9.8 (L) 12.0 - 16.0 g/dL Final   04/24/2018 10.2 (L) 12.0 - 16.0 g/dL Final   01/22/2018 9.6 (L) 12.0 - 16.0 g/dL Final     Platelets   Date Value Ref Range Status   06/11/2018 329 140 - 440 thou/uL Final   04/24/2018 372 140 - 440 thou/uL Final   01/22/2018 332 140 - 440 thou/uL Final       Lab Results   Component Value Date    RF 61.3 (H) 07/07/2017    SEDRATE 82 (H) 09/05/2017        Left vm inform pt returning call ASAP to arrange an OV  today.   Cinthia Ochoa [facility] 7/3/2018 9:45 AM

## 2021-06-20 NOTE — LETTER
Letter by Chelle Maravilla CHW at      Author: Chelle Maravilla CHW Service: -- Author Type: --    Filed:  Encounter Date: 7/22/2020 Status: (Other)       CARE COORDINATION  [facility]  [address]  [address]  [place] [address]      July 28, 2020    Kevin Ellis  [address]  [place] [address]      Dear Kevin,    I am a clinic community health worker  who works with Celso Maurice MD at [facility]. I have been trying to reach you recently to introduce Clinic Care Coordination and to see if there was anything I could assist you with.  Below is a description of clinic care coordination and how I can further assist you.      The clinic care coordination team is made up of a registered nurse,  and community health worker who understand the health care system. The goal of clinic care coordination is to help you manage your health and improve access to the health care system in the most efficient manner. The team can assist you in meeting your health care goals by providing education, coordinating services, strengthening the communication among your providers and supporting you with any resource needs.    Please feel free to contact me at [phone] with any questions or concerns. We are focused on providing you with the highest-quality healthcare experience possible and that all starts with you.     Sincerely,     Chelle ALMANZA  Community Health Worker

## 2021-06-20 NOTE — PROGRESS NOTES
ASSESSMENT AND PLAN:  Kevin Ellis 73 y.o. female is here for follow-up.  She has severe seropositive rheumatoid arthritis, renal failure.  She has osteoarthritis.  She has chronic elevation of sed rate going on for the past 3+ years.  She has had a [place] by her son and .  She reports that her joint pains have resolved completely.  She is very happy with that.  She has had Humira 2 injections, just finished 10 mg of prednisone.  She is going to consider tapering the prednisone down to 7-1/2 mg/5/2.5 mg as noted.  She is somewhat reluctant to take further oral medications.  We discussed that there are some patients will respond very quickly to Humira and while others take several injections.  Will meet here in 3 months with labs just prior.  She is aware not to take nonsteroidals, in view of her renal impairment.          Diagnoses and all orders for this visit:    Seropositive rheumatoid arthritis of multiple sites (H)  -     predniSONE (DELTASONE) 2.5 MG tablet; Take 7.5 mg/d prednisone PO for 1 month, reduce to 5 mg/d for the next month, and then reduce to 2.5 mg/d for the third month and then stop.  Dispense: 90 tablet; Refill: 2  -     ALT (SGPT); Standing  -     Albumin; Standing  -     Creatinine; Standing  -     HM2(CBC w/o Differential); Standing    Cyclic citrullinated peptide (CCP) antibody positive    CRF (chronic renal failure), stage 3 (moderate)  -     predniSONE (DELTASONE) 2.5 MG tablet; Take 7.5 mg/d prednisone PO for 1 month, reduce to 5 mg/d for the next month, and then reduce to 2.5 mg/d for the third month and then stop.  Dispense: 90 tablet; Refill: 2  -     ALT (SGPT); Standing  -     Albumin; Standing  -     Creatinine; Standing  -     HM2(CBC w/o Differential); Standing    Anemia of chronic renal failure, stage 4 (severe) (H)      HISTORY OF PRESENTING ILLNESS:  Kevin Ellis, 73 y.o., female is here for follow-up.  She has seropositive  severe rheumatoid arthritis in the background of severe renal impairment.  She has not had any more joint pains.  She noted 0 pain in all the joints.  She has had some neck discomfort.  She has had 2 injections of Humira that she tolerated well.  She just finished a course of prednisone 10 mg the other day.  She is no longer on leflunomide.  She is not taking nonsteroidals.  She noted morning stiffness of 30 minutes..  There is no history of fall.  She is also had neck discomfort which is not as bad as was previously.  Difficult time ambulating.    X-rays of the neck were taken on her previous visit.  That does not seem to be significant instability of her neck.She is also noted elbow pain on the right side.  Difficulty performing several day-to-day activities.  She has noted neck pain.  This radiates toward the occipital area.   Her sed rate has been chronically elevated as were back as May 2015.  Serum protein electrophoresis will be checked today.  There is no fever or weight loss she has not had mouth ulcers no cough no rash. She has noted no family or personal history of psoriasis.  She is accompanied by her  and the family.   Further historical information and ADL limitations as noted in the multidimensional health assessment questionnaire attached in the EMR.  .     ALLERGIES:Ibuprofen    PAST MEDICAL/ACTIVE PROBLEMS/MEDICATION/ FAMILY HISTORY/SOCIAL DATA:  The patient has a family history of  Past Medical History:   Diagnosis Date     Arthritis      Hypertension      Inverted nipple      History   Smoking Status     Never Smoker   Smokeless Tobacco     Never Used     Patient Active Problem List   Diagnosis     Bilateral low back pain without sciatica     Chronic pain of both shoulders     Elevated sed rate     Seropositive rheumatoid arthritis of multiple sites (H)     High risk medication use     CRF (chronic renal failure), stage 3 (moderate)     Acute pain of left knee     Tendonitis of both  shoulders     Anemia of chronic renal failure     Cyclic citrullinated peptide (CCP) antibody positive     Current Outpatient Prescriptions   Medication Sig Dispense Refill     adalimumab (HUMIRA PEN) 40 mg/0.8 mL PnKt Inject 40 mg under the skin every 14 (fourteen) days. 1 kit 5     ferrous sulfate 324 mg (65 mg iron) TbEC Take 1 tablet by mouth once.  3     folic acid (FOLVITE) 1 MG tablet Take 1 mg by mouth daily.  11     No current facility-administered medications for this visit.      DETAILED EXAMINATION  09/06/18  :  Vitals:    09/06/18 1155   BP: 100/58   Patient Site: Right Arm   Patient Position: Sitting   Cuff Size: Adult Regular   Pulse: 86   Weight: 147 lb (66.7 kg)     Alert oriented. Head including the face is examined for malar rash, heliotropes, scarring, lupus pernio. Eyes examined for redness such as in episcleritis/scleritis, periorbital lesions.   Neck/ Face examined for parotid gland swelling, range of motion of neck.  Left upper and lower and right upper and lower extremities examined for tenderness, swelling, warmth of the appendicular joints, range of motion, edema, rash.  Some of the important findings included: She is able to show full range of motion of the shoulders.  She does not have synovitis in any of the palpable joints of the upper extremities.  Minimal joint line tenderness of the knees.                LAB / IMAGING DATA:  ALT   Date Value Ref Range Status   07/03/2018 <9 0 - 45 U/L Final   04/24/2018 <9 0 - 45 U/L Final   12/28/2017 11 0 - 45 U/L Final     Albumin   Date Value Ref Range Status   07/03/2018 3.1 (L) 3.5 - 5.0 g/dL Final   04/24/2018 3.0 (L) 3.5 - 5.0 g/dL Final   12/28/2017 2.9 (L) 3.5 - 5.0 g/dL Final     Creatinine   Date Value Ref Range Status   07/03/2018 3.49 (H) 0.60 - 1.10 mg/dL Final   04/24/2018 3.12 (H) 0.60 - 1.10 mg/dL Final   01/22/2018 3.58 (H) 0.60 - 1.10 mg/dL Final       WBC   Date Value Ref Range Status   07/03/2018 10.5 4.0 - 11.0 thou/uL Final    06/11/2018 12.2 (H) 4.0 - 11.0 thou/uL Final     Hemoglobin   Date Value Ref Range Status   07/03/2018 11.2 (L) 12.0 - 16.0 g/dL Final   06/11/2018 9.8 (L) 12.0 - 16.0 g/dL Final   04/24/2018 10.2 (L) 12.0 - 16.0 g/dL Final     Platelets   Date Value Ref Range Status   07/03/2018 309 140 - 440 thou/uL Final   06/11/2018 329 140 - 440 thou/uL Final   04/24/2018 372 140 - 440 thou/uL Final       Lab Results   Component Value Date    RF 61.3 (H) 07/07/2017    SEDRATE 82 (H) 09/05/2017

## 2021-06-21 NOTE — LETTER
Letter by Chelle Maravilla CHW at      Author: Chelle Maravilla CHW Service: -- Author Type: --    Filed:  Encounter Date: 4/12/2021 Status: (Other)       CARE COORDINATION  [facility]  [address] [place] [address]    April 23, 2021    Kevin Gonzalesmassiel Castilloshani  [address] [place] [address]      Dear Kevin,    I am a clinic community health worker who works with Celso Maurice MD at the [facility]. I have been trying to reach you recently to introduce Clinic Care Coordination and to see if there was anything I could assist you with.  Below is a description of clinic care coordination and how I can further assist you.      The clinic care coordination team is made up of a registered nurse,  and community health worker who understand the health care system. The goal of clinic care coordination is to help you manage your health and improve access to the health care system in the most efficient manner. The team can assist you in meeting your health care goals by providing education, coordinating services, strengthening the communication among your providers and supporting you with any resource needs.    Please feel free to contact me at [phone] with any questions or concerns. We are focused on providing you with the highest-quality healthcare experience possible and that all starts with you.     Sincerely,     Chelle Community Health Worker

## 2021-06-22 NOTE — PROGRESS NOTES
ASSESSMENT AND PLAN:  Kevin Ellis 73 y.o. female is here for follow-up.  She has severe seropositive rheumatoid arthritis which has responded nicely to Humira, renal failure.  She has osteoarthritis.  She has chronic elevation of sed rate going on for the past 3+ years.  She is worsening pain in her shoulders bilaterally with features consistent with tendinopathy.  She is taking Humira regularly.  We discussed options for osteoarthritis.  She may be a candidate for pain clinic.  For that with rotator cuff tendinopathy symptoms as discussed corticosteroid injections she wants to proceed 40 mg of methylprednisolone injected into each subacromial space.  Return for follow-up.  In 6 months or sooner.          Diagnoses and all orders for this visit:    Seropositive rheumatoid arthritis of multiple sites (H)  -     methylPREDNISolone acetate injection 40 mg (DEPO-MEDROL); Inject 1 mL (40 mg total) into the joint once.        -     methylPREDNISolone acetate injection 40 mg (DEPO-MEDROL); Inject 1 mL (40 mg total) into the joint once.          Tendonitis of both shoulders  -     methylPREDNISolone acetate injection 40 mg (DEPO-MEDROL); Inject 1 mL (40 mg total) into the joint once.        -     methylPREDNISolone acetate injection 40 mg (DEPO-MEDROL); Inject 1 mL (40 mg total) into the joint once.          CRF (chronic renal failure), stage 3 (moderate) (H)    Cyclic citrullinated peptide (CCP) antibody positive      HISTORY OF PRESENTING ILLNESS:  Kevin Ellis, 73 y.o., female is here for follow-up.  She has seropositive severe rheumatoid arthritis in the background of severe renal impairment.  She noted worsening pain.  This is in her shoulders.  Worse at night when she rolls over.  Reaching is hard.  She has noted neck and back pain.  She has been able to tolerate Humira nicely.  There is been no swelling of her joints such as in the hands or wrists she is no longer on  prednisone.  She is aware not to take nonsteroidals.  She has worsening renal impairment.  She follows up with nephrology.  Her sed rate has been chronically elevated as were back as May 2015.  Serum protein electrophoresis will be checked today.  There is no fever or weight loss she has not had mouth ulcers no cough no rash. She has noted no family or personal history of psoriasis.  She is accompanied by her  and the family.   Further historical information and ADL limitations as noted in the multidimensional health assessment questionnaire attached in the EMR.  .     ALLERGIES:Ibuprofen    PAST MEDICAL/ACTIVE PROBLEMS/MEDICATION/ FAMILY HISTORY/SOCIAL DATA:  The patient has a family history of  Past Medical History:   Diagnosis Date     Arthritis      Hypertension      Inverted nipple      Social History     Tobacco Use   Smoking Status Never Smoker   Smokeless Tobacco Never Used     Patient Active Problem List   Diagnosis     Bilateral low back pain without sciatica     Chronic pain of both shoulders     Elevated sed rate     Seropositive rheumatoid arthritis of multiple sites (H)     High risk medication use     CRF (chronic renal failure), stage 3 (moderate) (H)     Acute pain of left knee     Tendonitis of both shoulders     Anemia of chronic renal failure     Cyclic citrullinated peptide (CCP) antibody positive     Current Outpatient Medications   Medication Sig Dispense Refill     adalimumab (HUMIRA PEN) 40 mg/0.8 mL PnKt Inject 40 mg under the skin every 14 (fourteen) days. 1 kit 5     ferrous sulfate 324 mg (65 mg iron) TbEC Take 1 tablet by mouth once.  3     predniSONE (DELTASONE) 2.5 MG tablet Take 7.5 mg/d prednisone PO for 1 month, reduce to 5 mg/d for the next month, and then reduce to 2.5 mg/d for the third month and then stop. 90 tablet 2     No current facility-administered medications for this visit.      DETAILED EXAMINATION  12/06/18  :  Vitals:    12/06/18 1438   BP: 128/80    Patient Site: Right Arm   Patient Position: Sitting   Cuff Size: Adult Regular   Pulse: 84   Weight: 147 lb (66.7 kg)     Alert oriented. Head including the face is examined for malar rash, heliotropes, scarring, lupus pernio. Eyes examined for redness such as in episcleritis/scleritis, periorbital lesions.   Neck/ Face examined for parotid gland swelling, range of motion of neck.  Left upper and lower and right upper and lower extremities examined for tenderness, swelling, warmth of the appendicular joints, range of motion, edema, rash.  Some of the important findings included: Painful arc on abduction and internal rotation of both shoulders.  She does not have synovitis in any of the palpable joints of the upper extremities.  Minimal joint line tenderness of the knees.                LAB / IMAGING DATA:  ALT   Date Value Ref Range Status   12/04/2018 10 0 - 45 U/L Final   07/03/2018 <9 0 - 45 U/L Final   04/24/2018 <9 0 - 45 U/L Final     Albumin   Date Value Ref Range Status   12/04/2018 3.0 (L) 3.5 - 5.0 g/dL Final   07/03/2018 3.1 (L) 3.5 - 5.0 g/dL Final   04/24/2018 3.0 (L) 3.5 - 5.0 g/dL Final     Creatinine   Date Value Ref Range Status   12/04/2018 4.16 (H) 0.60 - 1.10 mg/dL Final   07/03/2018 3.49 (H) 0.60 - 1.10 mg/dL Final   04/24/2018 3.12 (H) 0.60 - 1.10 mg/dL Final       WBC   Date Value Ref Range Status   12/04/2018 8.8 4.0 - 11.0 thou/uL Final   07/03/2018 10.5 4.0 - 11.0 thou/uL Final     Hemoglobin   Date Value Ref Range Status   12/04/2018 11.4 (L) 12.0 - 16.0 g/dL Final   07/03/2018 11.2 (L) 12.0 - 16.0 g/dL Final   06/11/2018 9.8 (L) 12.0 - 16.0 g/dL Final     Platelets   Date Value Ref Range Status   12/04/2018 302 140 - 440 thou/uL Final   07/03/2018 309 140 - 440 thou/uL Final   06/11/2018 329 140 - 440 thou/uL Final       Lab Results   Component Value Date    RF 61.3 (H) 07/07/2017    SEDRATE 82 (H) 09/05/2017

## 2021-06-24 NOTE — ANESTHESIA POSTPROCEDURE EVALUATION
Patient: Kevin Ellis  LEFT ARM  ARTERIOVENOUS GRAFT PLACEMENT  Anesthesia type: regional    Patient location: [facility] Surgical Floor  Last vitals:   Vitals:    02/21/19 0941   BP: 128/78   Pulse: 90   Resp: 20   Temp:    SpO2: 100%     Post vital signs: stable  Level of consciousness: awake and responds to simple questions  Post-anesthesia pain: pain controlled  Post-anesthesia nausea and vomiting: no  Pulmonary: unassisted, return to baseline  Cardiovascular: stable and blood pressure at baseline  Hydration: adequate  Anesthetic events: no    QCDR Measures:  ASA# 11 - Rakel-op Cardiac Arrest: ASA11B - Patient did NOT experience unanticipated cardiac arrest  ASA# 12 - Rakel-op Mortality Rate: ASA12B - Patient did NOT die  ASA# 13 - PACU Re-Intubation Rate: ASA13B - Patient did NOT require a new airway mgmt  ASA# 10 - Composite Anes Safety: ASA10A - No serious adverse event    Additional Notes:

## 2021-06-24 NOTE — ANESTHESIA CARE TRANSFER NOTE
Last vitals:   Vitals:    02/21/19 0941   BP: 128/78   Pulse: 90   Resp: 20   Temp:    SpO2: 100%     Patient's level of consciousness is awake  Spontaneous respirations: yes  Maintains airway independently: yes  Dentition unchanged: yes  Oropharynx: oropharynx clear of all foreign objects    QCDR Measures:  ASA# 20 - Surgical Safety Checklist: WHO surgical safety checklist completed prior to induction    PQRS# 430 - Adult PONV Prevention: 4558F - Pt received => 2 anti-emetic agents (different classes) preop & intraop  ASA# 8 - Peds PONV Prevention: NA - Not pediatric patient, not GA or 2 or more risk factors NOT present  PQRS# 424 - Rakel-op Temp Management: 4559F - At least one body temp DOCUMENTED => 35.5C or 95.9F within required timeframe  PQRS# 426 - PACU Transfer Protocol: - Transfer of care checklist used  ASA# 14 - Acute Post-op Pain: ASA14B - Patient did NOT experience pain >= 7 out of 10

## 2021-06-24 NOTE — PATIENT INSTRUCTIONS - HE
1.  Follow up in 4 weeks with thoracic spine xray.  2.  WEARING THE LUMBAR BRACE:  * Wear the brace when out of bed or sitting upright in bed.  * You should apply the brace before standing.  * You may shower seated without brace.   Sit down on shower chair, remove brace   Shower   Dry   Re-apply brace before standing.   Take care not to fall  *If your brace is not fitting call  Orthotist  *Check  the skin under your brace at least daily.  3.  *No lifting, pushing or pulling greater than 5-10 pound (this is about a gallon of milk).  *No repetitive bending, twisting, or jarring activities  *No overhead work  *No aerobic or strenuous activity  *No activities with increased risk of falls  *You may move about your home as tolerated  *You may walk up and down stairs as tolerated

## 2021-06-24 NOTE — PROGRESS NOTES
NEUROSURGERY FOLLOW UP EVALUATION:    ASSESSMENT/ PLAN:  Kevin Ellis is a 73 y.o. female, who was recently hospitalized for rapidly declining renal failure thought to be secondary to autoimmune disease. A fistula was placed and she was started on hemodialysis and discharged home. She presented to the ED on 2/25/2019 for evaluation of pain in her back and right buttock after falling three days prior. She reports she fell on her buttock. Imaging revealed an acute compression fracture of the vertebral body of T12 and the neurosurgical service was consulted for further recommendations. She was placed in a TLSO.      PLAN:  1.  Follow up in 4 weeks with thoracic spine xray.  2.  WEARING THE LUMBAR BRACE:  * Wear the brace when out of bed or sitting upright in bed.  * You should apply the brace before standing.  * You may shower seated without brace.   Sit down on shower chair, remove brace   Shower   Dry   Re-apply brace before standing.   Take care not to fall  *If your brace is not fitting call  Orthotist  *Check  the skin under your brace at least daily.  3.  *No lifting, pushing or pulling greater than 5-10 pound (this is about a gallon of milk).  *No repetitive bending, twisting, or jarring activities  *No overhead work  *No aerobic or strenuous activity  *No activities with increased risk of falls  *You may move about your home as tolerated  *You may walk up and down stairs as tolerated    Today she reports back pain over fracture site which is improving.     EXAM:    Alert and oriented x3, speech clear vi . She understands some English.   PERRL, EOMI, face symmetric, tongue midline, shoulder shrug equal  No pronator drift, finger to nose smooth and accurate bilaterally  Arm strength: 5/5  Leg strength: 5/5   Bulk and tone: normal     IMAGING:  The imaging was personally reviewed by me.   Superior endplate fracture at T12 with 30% vertebral body height loss anteriorly, unchanged.  Accentuated kyphosis at this level. No new fractures. Moderate spondylosis is unchanged. Right internal jugular approach dialysis catheter is present    Carin King NP  [facility] Neurosurgery

## 2021-06-24 NOTE — TELEPHONE ENCOUNTER
PATIENT NAME:  Kevin Ellis  YOB: 1945  MRN: [id number]  SURGEON: Dr. Rodríguez  DATE of CONSULT:  2-26-19  DIAGNOSIS: T12 FRACTURE ( FELL ON 2-22-19)    FOLLOW-UP:  Post HOSPITAL CONSULT F/U Visit: 2 weeks   Post-op Provider: NP  DIAGNOSTICS:  radiology: X-Ray: THORACIC, AP/LAT STANDING IN BRACE  (ORDERED 3-1-19)  DISPOSITION:  HOME WITH FAMILY 2-27-19    ADDITIONAL INSTRUCTIONS FOR MEDICAL STAFF:

## 2021-06-24 NOTE — TELEPHONE ENCOUNTER
Kelly from  [facility] called to notify Dr. Hooper that patient had dialysis on Saturday and noticed that her fistula had no thrill and bruit. AV fistula was just place on 2/21/19. Based on chart, pt is also currently in ED for fall and back pain.     Alerted Dr. Hooper.

## 2021-06-24 NOTE — ANESTHESIA PROCEDURE NOTES
Peripheral Block    Patient location during procedure: pre-op  Start time: 2/21/2019 7:27 AM  End time: 2/21/2019 7:31 AM  surgical anesthesia  Staffing:  Performing  Anesthesiologist: Kimani Mendez MD  Preanesthetic Checklist  Completed: patient identified, site marked, risks, benefits, and alternatives discussed, timeout performed, consent obtained, airway assessed, oxygen available, suction available, emergency drugs available and hand hygiene performed  Peripheral Block  Block type: brachial plexus, axillary  Prep: ChloraPrep  Patient position: supine  Patient monitoring: cardiac monitor, continuous pulse oximetry, heart rate and blood pressure  Laterality: left  Injection technique: ultrasound guided    Ultrasound used to visualize needle placement in proximity to nerve being blocked: yes   Permanent ultrasound image captured for medical record  Sterile gel and probe cover used for ultrasound.    Needle  Needle type: Stimuplex   Needle gauge: 21 G  Needle length: 4 in  no peripheral nerve catheter placed  Assessment  Injection assessment: no difficulty with injection, negative aspiration for heme, no paresthesia on injection and incremental injection

## 2021-06-25 NOTE — PROGRESS NOTES
VASCULAR SURGERY OUTPATIENT CONSULT OR VISIT   VASCULAR SURGEON: Sheri Hooper MD    LOCATION:  [facility]    Kevin Ellis   Medical Record #:  [id number]  YOB: 1945  Age:  73 y.o.     Date of Service: 3/20/2019    PRIMARY CARE PROVIDER: Karly Bird MD      Reason for consultation: Evaluation for dialysis access    IMPRESSION: Patient recently started on hemodialysis for whom I placed an AV graft that occluded early likely secondary to poor outflow vein.  Currently dialyzing through a PermCath.  Despite having been dialyzed for 3 weeks now still clearly does not like being on dialysis.  That said she is now committed to staying on dialysis and would like her graft revised to be functional again.    RECOMMENDATION: Tentatively scheduled for AV graft revision versus new AV graft placement on the left arm under regional block within the next week or so.    HPI:  Kevin Ellis is a 73 y.o. female who was seen today in follow-up for her hemodialysis.  Complex situation in that she had presented with acute renal failure and required a PermCath.  During that hospitalization I placed an AV loop graft recognize that she had inadequate veins for fistula.  I think to the AV loop graft to a borderline caliber basilic vein in the hope of having it grow to eventually be a fistula option.  Instead the graft failed early.  She was readmitted after a fall and rib fractures and at that time we recognize that the graft did not have flow.  Discussing with her about revision and became clear that she was terribly unhappy and did not want to be on dialysis.  After discussion with her nephrologist vein turn approach the patient and she agreed to continue dialysis for a few weeks until she was out of the acute phase of anxiety around going on dialysis, having a fall and fractures, and all the difficulties coping with the situation.  They agreed that if at that  time she still did not want to be alive on dialysis they could stop.    Comes to me today she is seems to want to avoid this discussion, telling me that if the medical people think she should be on dialysis then she would go on it.  Even her son admits that she is profoundly unhappy with a tough time during dialysis.  She has fainted several times during dialysis.  I gave her 3 options.  The first to undergo revision to a graft with the idea of being on dialysis long-term and accepting the quality of life she has.  The second to continue with a PermCath until complication arose and at that time make a decision of whether to forego dialysis, this way she does not have to make any immediate decisions about dying.  The third would be to stop dialysis now recognizing that she would likely not survive very long.  She asked me if it might be possible for her not to be on dialysis 3 times a week and I said that it is further question to her nephrologist as if she had some recovery of renal function that occasionally is a case but in general not.  She does make some urine.  Overall at the end of our discussion she requested for me to revise her graft.  I think this to mean that she has far from ready to give up on life.    No other new health issues.  Spent some time discussing that I would revise a graft to a better venous outflow but if that if this did not look good I might instead put a brand-new graft from the brachial artery to the axilla.    PHH:    Past Medical History:   Diagnosis Date     Arthritis      Hypertension      Inverted nipple         Past Surgical History:   Procedure Laterality Date     AV FISTULA PLACEMENT Left 2/21/2019    Procedure: LEFT ARM  ARTERIOVENOUS GRAFT PLACEMENT;  Surgeon: Sheri Hooper MD;  Location: [facility];  Service: General     IR TUNNELED CATHETER INSERT  2/15/2019     left hip replacement      per new patient questionaire       ALLERGIES:  Ibuprofen    MEDS:    Current  Outpatient Medications:      calcium acetate (PHOSLO) 667 mg capsule, Take 2 capsules (1,334 mg total) by mouth 3 (three) times a day with meals., Disp: 180 capsule, Rfl: 0     predniSONE (DELTASONE) 2.5 MG tablet, Take 2.5 mg by mouth daily., Disp: 30 tablet, Rfl: 0     acetaminophen (TYLENOL) 500 MG tablet, Take 1-2 tablets (500-1,000 mg total) by mouth every 4 (four) hours as needed., Disp: , Rfl: 0     adalimumab (HUMIRA PEN) 40 mg/0.8 mL PnKt, Inject 40 mg under the skin every 14 (fourteen) days., Disp: 1 kit, Rfl: 5     midodrine (PROAMATINE) 2.5 MG tablet, Take 1 tablet (2.5 mg total) by mouth 2 (two) times a day with meals., Disp: 60 tablet, Rfl: 1     polyethylene glycol (MIRALAX) 17 gram packet, Take 1 packet (17 g total) by mouth daily., Disp: , Rfl: 0     traMADol (ULTRAM) 50 mg tablet, Take 1 tablet (50 mg total) by mouth every 8 (eight) hours as needed for pain., Disp: 20 tablet, Rfl: 0    SOCIAL HABITS:    Social History     Tobacco Use   Smoking Status Never Smoker   Smokeless Tobacco Never Used       Social History     Substance and Sexual Activity   Alcohol Use No       Social History     Substance and Sexual Activity   Drug Use No       FAMILY HISTORY:  No family history on file.    REVIEW OF SYSTEMS:    A 12 point ROS was reviewed and except for what is listed in the HPI above, all others are negative    PE:  /68 (Patient Site: Right Arm, Patient Position: Sitting, Cuff Size: Adult Regular)   Pulse 80   Temp 98  F (36.7  C)   LMP 05/05/1985   Wt Readings from Last 1 Encounters:   03/12/19 142 lb (64.4 kg)     There is no height or weight on file to calculate BMI.    EXAM:  GENERAL: This is a well-developed 73 y.o. female who appears her stated age  EYES: Grossly normal.  MOUTH: Buccal mucosa normal   CARDIAC:  Not assessed  CHEST/LUNG:  Not Assessed  GASTROINTESINAL (ABDOMEN):Not Assessed   MUSCULOSKELETAL: Grossly normal and both lower extremities are intact.  HEME/LYMPH: No  lymphedema  NEUROLOGIC: Focally intact, Alert and oriented x 3.   PSYCH: appropriate affect  INTEGUMENT: No open lesions or ulcers          DIAGNOSTIC STUDIES:     Images:  Xr Thoracic Spine 2 Vws    Result Date: 3/12/2019  [facility] RADIOLOGY EXAM: XR THORACIC SPINE 2 VWS LOCATION: [facility] DATE/TIME: 3/12/2019 1:19 PM INDICATION: Two-week follow-up of the T12 fracture. COMPARISON: 02/28/2019. FINDINGS: Superior endplate fracture at T12 with 30% vertebral body height loss anteriorly, unchanged. Accentuated kyphosis at this level. No new fractures. Moderate spondylosis is unchanged. Right internal jugular approach dialysis catheter is present.     Xr Thoracic Spine 2 Vws    Result Date: 2/26/2019  [facility] RADIOLOGY EXAM: XR THORACIC SPINE 2 VWS LOCATION: [facility] DATE/TIME: 2/26/2019 4:12 PM INDICATION: T12 compression fracture follow up; tlso in place COMPARISON: CT lumbar spine 2/25/2019 FINDINGS: Patient is upright in a brace. Overall unchanged appearance of the T12 fracture when compared to the 2/25/2019 CT study. No progressive height loss. Right-sided central venous catheter.    Ct Lumbar Spine Without Contrast    Result Date: 2/25/2019  [facility] RADIOLOGY EXAM: CT LUMBAR SPINE WO CONTRAST LOCATION: [facility] DATE/TIME: 2/25/2019 12:01 PM INDICATION: Spine fracture, traumatic, lumbar fall COMPARISON: None. TECHNIQUE: Routine without IV contrast. Multiplanar reformats. Dose reduction techniques were used. FINDINGS: VERTEBRA: Acute fracture of the T12 superior endplate with approximately 30% height loss and no significant retropulsion. Remaining vertebral body heights are maintained. No additional displaced fracture. No posttraumatic subluxation. Normal lumbar alignment. No aggressive osseous lesion. CANAL/FORAMINA: Mild multilevel degenerative changes of the lumbar spine. No high-grade canal or neural foraminal stenosis. PARASPINAL: No acute abnormality is included  retroperitoneum and paraspinal soft tissues.     CONCLUSION: 1.  Acute fracture of the T12 superior endplate with approximately 30% height loss and no retropulsion. 2.  No additional fracture or traumatic subluxation. 3.  No high grade spinal canal or neural foraminal stenosis.     Us Venous Leg Right    Result Date: 2/25/2019  US VENOUS LEG RIGHT 2/25/2019 7:06 PM INDICATION: Rt. leg pain TECHNIQUE: Routine exam without and with compression, augmentation, and duplex utilizing 2D gray-scale imaging, Doppler interrogation with color-flow and spectral waveform analysis. COMPARISON: None. FINDINGS: The common femoral, femoral, popliteal, and segmentally visualized calf veins were evaluated. The opposite CFV was also included in the evaluation. Right leg veins are negative for deep venous thrombosis. No popliteal cysts.     CONCLUSION: 1.  Right leg veins are negative for DVT.    Ct Pelvis Without Oral Without Iv Contrast    Result Date: 2/25/2019  [facility] RADIOLOGY EXAM: CT PELVIS WO ORAL WO IV CONTRAST LOCATION: [facility] DATE/TIME: 2/25/2019 12:06 PM INDICATION: Hip pain. COMPARISON: CT abdomen and pelvis 2/17/2019. TECHNIQUE: Helical enhanced thin-section CT scan of the pelvis was performed without IV contrast. Multiplanar reformats were obtained. Dose reduction techniques were used. CONTRAST: None. FINDINGS: Left total hip arthroplasty in place. Findings suggestive of asymmetric superolateral left polyethylene liner wear. Focal osteolytic cyst along the superior and anterosuperior left acetabulum along with considerable osteolysis and cortical destruction along the posterior proximal left periprosthetic femur involving the intertrochanteric region. 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 No evidence for acute displaced lower lumbar spine, sacral, pelvic, or proximal femoral fracture. Moderate degenerative change at the symphysis pubis with mild right hip osteoarthritis and mild  bilateral sacroiliac joint osteoarthritis. Partial demonstration of degenerative change in the lower lumbar spine. Nonspecific diffuse stranding along the mesenteric vessels in the lower abdomen and pelvis. Colonic diverticulosis. Arterial calcification. Tiny periumbilical hernia. Uterine wall calcifications. No sizable hip joint effusion.     CONCLUSION: 1.  Findings compatible with asymmetric polyethylene liner wear left total hip arthroplasty. 2.  Extensive osteolysis seen about the posterior native proximal left femur about the femoral stem component of a left total hip arthroplasty extending from the intertrochanteric region to the posterior subtrochanteric region. Osteolytic cyst formation superior and anterosuperior left acetabulum. Findings can be seen with particle disease or failure of the left total hip arthroplasty. 3.  Moderate degenerative change symphysis pubis. Mild bilateral hip and sacroiliac joint osteoarthritis. 4.  Colonic diverticulosis. Nonspecific diffuse stranding along the mesenteric vessels in the lower abdomen and pelvis. 5.  No definitive acute displaced lower lumbar spine, sacral, pelvic, or proximal femur fracture identified.    Us Hemodialysis Access Fistula Or Graft    Result Date: 3/20/2019  LEFT ARM HEMODIALYSIS 4 7 Taper Goretex Heparin Bonded Graft  ULTRASOUND Indication:  LEFT ARM DIALYSIS GRAFT SURVEILLANCE   Access Description:  Left arm Previous: NONE  Technique: Color Doppler and spectral waveform analysis performed at the inflow native artery and outflow native vein. Location Description Blood Flow Volume (cc/min) Velocities (cm/sec) Waveforms Pattern Inflow Native Artery Distal Brachial   153/116   BIPHASIC Arterial/ Venous Anastamosis    96/139/ OCCLUDED Monophasic Outflow Native Vein Distal Basilic  OCCLUDED   Monophasic Subclavian Vein    Monophasic Comments: OCCLUDED LEFT ARM BRACHIAL ART- BASILIC VEIN BPG Impression: Occluded left forearm loop dialysis graft.     Us  Hemodialysis Access Fistula Or Graft    Result Date: 2/25/2019  [facility] LEFT ARM ARTERIOVENOUS GRAFT DUPLEX ULTRASOUND 2/25/2019 2:29 PM INDICATION: End-stage renal disease. Indwelling tunneled dialysis catheter. Left forearm loop graft created on 2/21/2019. No pulse or thrill. TECHNIQUE: Gray-scale, color duplex, and spectral Doppler ultrasound performed. COMPARISON: None. FINDINGS: Normal triphasic waveforms within the brachial artery, around the anastomosis. Pulsatile flow within the proximal aspect of the graft, near the arterial anastomosis. Remainder of the graft is occluded with no flow seen on color or spectral Doppler ultrasound. Mildly pulsatile flow seen within the visualized basilic vein and subclavian veins. LEFT ARM ARTERIOVENOUS GRAFT VELOCITY MEASUREMENTS: Native Artery Brachial: 88 cm/s Arterial Anastomosis: 194 cm/s Graft Proximal: 98 cm/s Graft Mid: Occluded Graft Distal: Occluded Venous Anastomosis: -- Native Vein Basilic: 10 cm/s     1.  Thrombosed left forearm graft.      I personally reviewed the images and my interpretation is that her AV graft is occluded.    LABS:      Sodium   Date Value Ref Range Status   02/27/2019 136 136 - 145 mmol/L Final   02/22/2019 138 136 - 145 mmol/L Final   02/20/2019 129 (L) 136 - 145 mmol/L Final     Potassium   Date Value Ref Range Status   02/27/2019 4.6 3.5 - 5.0 mmol/L Final   02/22/2019 3.8 3.5 - 5.0 mmol/L Final   02/21/2019 3.8 3.5 - 5.0 mmol/L Final     Chloride   Date Value Ref Range Status   02/27/2019 101 98 - 107 mmol/L Final   02/22/2019 107 98 - 107 mmol/L Final   02/20/2019 96 (L) 98 - 107 mmol/L Final     BUN   Date Value Ref Range Status   02/27/2019 49 (H) 8 - 28 mg/dL Final   02/22/2019 24 8 - 28 mg/dL Final   02/20/2019 22 8 - 28 mg/dL Final     Creatinine   Date Value Ref Range Status   02/27/2019 7.03 (HH) 0.60 - 1.10 mg/dL Final   02/22/2019 5.44 (H) 0.60 - 1.10 mg/dL Final   02/20/2019 5.21 (H) 0.60 - 1.10 mg/dL Final      Hemoglobin   Date Value Ref Range Status   02/27/2019 8.3 (L) 12.0 - 16.0 g/dL Final   02/22/2019 8.2 (L) 12.0 - 16.0 g/dL Final   02/20/2019 8.3 (L) 12.0 - 16.0 g/dL Final     Platelets   Date Value Ref Range Status   02/27/2019 126 (L) 140 - 440 thou/uL Final   02/25/2019 111 (L) 140 - 440 thou/uL Final   02/22/2019 252 140 - 440 thou/uL Final     INR   Date Value Ref Range Status   02/18/2019 1.24 (H) 0.90 - 1.10 Final   02/15/2019 1.22 (H) 0.90 - 1.10 Final       Total time spent 25 minutes face to face with patient with more than 50% time spent in counseling and coordination of care.    Sheri Hooper MD  VASCULAR SURGERY

## 2021-06-25 NOTE — PROGRESS NOTES
US 3/19/19- 1 month f/u. Carlos Osborne. AV fistula placed on 2/21/19. Graft is blocked. Does she want to continue Dialysis??       Patient was told she needs to continue dialysis for rest of her life

## 2021-06-25 NOTE — PATIENT INSTRUCTIONS - HE
Procedure: Revision of L AV fistula     Location: [facility], 4/2/19      The surgery scheduler Peri Hayes at [phone] will contact you to schedule if you were not scheduled today.     You will need a preop physical within 30 days before surgery with your primary care provider. Please note if you do not get a complete History and Physical your surgery will be cancelled.   Please contact your primary to schedule.     A nurse should contact you from the hospital 1-2 days before surgery to review with you.    If you would like a [place] Estimate for your upcoming service/procedure contact Cost of Care Estimates at [phone], advocates are available Monday through Friday 8am - 5pm. You may also submit a request online at [email or web address]      Anesthesiology charge  [phone]          Please don't hesitate to call us with any additional question or problems  Our number is [phone]

## 2021-06-25 NOTE — PROGRESS NOTES
Surgery/Procedure: revision left avf     Special Equipment: to be determined    Location: [facility]    Date: 04/02/19    Time: 1200pm    Surgeon: Dr. Hooper    Assist: to be determined    Length of Surgery: 150 min    OR Confirmed/ :  Ean nova on 3/20/19    Orders In:  Yes    Provider Team Notified:  Yes    Entered on [employer] / [place] Calendar:  Yes    Post Op: uls 4/17/19 and DR Hooper @

## 2021-06-26 NOTE — PROGRESS NOTES
Progress Notes by Yulia Zamora PTA at 6/13/2018 11:00 AM     Author: Yulia Zamora PTA Service: -- Author Type: Physical Therapist Assistant    Filed: 6/13/2018 11:34 AM Encounter Date: 6/13/2018 Status: Attested    : Yulia Zamora PTA (Physical Therapist Assistant) Cosigner: Maureen Conteh PT at 7/5/2018  5:41 PM    Attestation signed by Maureen Conteh PT at 7/5/2018  5:41 PM    [facility] Discharge Summary  Patient Name: Kevin Ellis  Date: 7/5/2018  Referral Diagnosis: Seropositive rheumatoid arthritis of multiple sites  Referring provider: Karly Bird, *  Visit Diagnosis:   1. Tendonitis of both shoulders     2. Acute pain of left knee     3. Chronic pain of both shoulders         Goals: met  Pt. will demonstrate/verbalize independence in self-management of condition in : 4 weeks;Progressing toward  Pt. will be independent with home exercise program in : 4 weeks;Progressing toward  Patient will demonstrate proper body mechanics : for lifting;for bending;with less pain;for chores;in 4 weeks;Progressing toward  Patient will reach / maintain arm movement: forward;overhead;behind;for home chores;with less pain;in 4 weeks;Progressing toward      Patient was seen for 06 visits from 5-18-18 to 6-13-18 with 0 missed appointments.  The patient met goals and has demonstrated understanding of and independence in the home program for self-care, and progression to next steps.  She will initiate contact if questions or concerns arise.  The patient was instructed to follow up with physician's clinic.    Therapy will be discontinued at this time.  The patient will need a new referral to resume.    Thank you for your referral.  Maureen Conteh PT  7/5/2018  5:40 PM                [facility] Daily Progress     Patient Name: Kevin Ellis  Date: 6/13/2018  Visit #: 6  ESTEFANIA visit #:  3      Date of evaluation: 5/18/2018  Referral  Diagnosis: Seropositive rheumatoid arthritis of multiple sites  Referring provider: Cinthia Ochoa MBBS  Visit Diagnosis:     ICD-10-CM    1. Tendonitis of both shoulders M75.81     M75.82    2. Acute pain of left knee M25.562    3. Chronic pain of both shoulders M25.511     G89.29     M25.512          Assessment:     Decreased c/o pain. Posture improved.  Chief complaint is R elbow pain. Pt is not having B shoulder pain.      Patient is benefitting from skilled physical therapy and is making steady progress toward functional goals.  Patient is appropriate to continue with skilled physical therapy intervention, as indicated by initial plan of care.      Goal Status: Progressing towords limited due to R elbow  Pt. will demonstrate/verbalize independence in self-management of condition in : 4 weeks;Progressing toward  Pt. will be independent with home exercise program in : 4 weeks;Progressing toward  Patient will demonstrate proper body mechanics : for lifting;for bending;with less pain;for chores;in 4 weeks;Progressing toward  Patient will reach / maintain arm movement: forward;overhead;behind;for home chores;with less pain;in 4 weeks;Progressing toward      Plan / Patient Education:     No further appointments scheduled at this time. Pt should do well with an independent home program.  Pt instructed to ice  her R elbow. Pt instructed to follow up with her PCP if the elbow pain persists.        Subjective:   Pt reports her R elbow is sore. Pt states that the elbow pain wakes her at night.  Pt has been working on her posture.  Pt is not having shoulder pain, chief c/o is R elbow pain.    Pain Ratin/10 B shoulders    : Rodriguez BRAMBILA        Objective:    Tender R lateral epicondyl. Pt has pain with resisted R elbow flexion and R wrist exetnsion     B shoulder ROM: all planes WNL with c/o R elbow pain  B shoulder strength: grossly +4/5     Exercises:  Exercise #1: shoulder shrugs, circles.  Exercise #2:   "rows and shoulder ext with L2 band  Comment #2: x10 each  Exercise #3: scapular retraction   Comment #3: 5\" x10  Exercise #4: B shoulder ER L2 band   Comment #4: x8  Exercise #5: UBE : WL : 1, 3 minutes each direction  Comment #5: serratus wall slide.    Treatment Today     TREATMENT MINUTES COMMENTS   Evaluation     Self-care/ Home management  Review posture and body mechanics.   Manual therapy     Neuromuscular Re-education     Therapeutic Activity     Therapeutic Exercises 30 UBE WL 1  3' each way.  DC B ER with L2 band  Pt having R elbow pain  Added to HEP:  -SL ER  Objective measures taken   Gait training     Modality_____ultrasound_____________                Total 30    Blank areas are intentional and mean the treatment did not include these items.       Yulia Harris,CHELSEA  6/13/2018           "

## 2021-06-28 NOTE — PROGRESS NOTES
Progress Notes by Joe Mullins PT at 3/12/2020  1:30 PM     Author: Joe Mullins PT Service: -- Author Type: Physical Therapist    Filed: 3/12/2020  2:45 PM Encounter Date: 3/12/2020 Status: Attested    : Joe Mullins PT (Physical Therapist) Cosigner: Celso Maurice MD at 3/12/2020  3:08 PM    Attestation signed by Celso Maurice MD at 3/12/2020  3:08 PM    z                    [facility] Certification Request    March 12, 2020      Patient: Kevin Ellis  MR Number: [id number]  YOB: 1945  Date of Visit: 3/12/2020      Dear Dr. Celso Maurice    Thank you for this referral.   We are seeing Kevin Ellis in Physical Therapy for right hip pain.    Medicare and/or Medicaid requires physician review and approval of the treatment plan. Please review the plan of care and verify that you agree with the therapy plan of care by co-signing this note.      Plan of Care  Authorization / Certification Start Date: 03/12/20  Authorization / Certification End Date: 06/10/20  Authorization / Certification Number of Visits: 12  Communication with: Referral Source  Patient Related Instruction: Nature of Condition;Treatment plan and rationale;Self Care instruction;Basis of treatment;Body mechanics;Posture;Precautions;Next steps;Expected outcome  Times per Week: 1-2  Number of Weeks: 6-8  Number of Visits: 12  Therapeutic Exercise: ROM;Stretching;Strengthening  Neuromuscular Reeducation: balance/proprioception;core  Manual Therapy: joint mobilization;soft tissue mobilization      Goals:  Pt. will be independent with home exercise program in : 6 weeks  Pt. will have improved quality of sleep: waking less times/night;in 6 weeks;Comment  Comment:: and able to lie on the R side without increased pain  Pt. will bend: to dress;with no pain;in 6 weeks  Patient will twist/turn : in bed;with no pain;for bed mobilitiy;in 6 weeks  Patient will  sit: Comment  Comment: for dialysis without increased pain in 6 weeks.        If you have any questions or concerns, please don't hesitate to call.    Sincerely,      Joe Mullins, PT        Physician recommendation:     ___ Follow therapist's recommendation        ___ Modify therapy      *Physician co-signature indicates they certify the need for these services furnished within this plan and while under their care.    [facility]   Initial Evaluation    Patient Name: Kevin Ellis  Date of evaluation: 3/12/2020  PRECAUTIONS: RA, ESRD  Referral Diagnosis: Hip pain, right   Referring provider: Celso Maurice MD  Visit Diagnosis:     ICD-10-CM    1. Acute right hip pain  M25.551    2. Hip stiffness, right  M25.651    3. Generalized muscle weakness  M62.81    4. Unsteadiness on feet  R26.81        Assessment:      Pt. is appropriate for skilled PT intervention as outlined in the Plan of Care (POC).  Pt. is a good candidate for skilled PT services to improve pain levels and function.  Goals and POC established in collaboration with the patient.    Pt presents to PT with acute right hip pain, stiffness, and weakness, most consistent with OA/degnerative changes.  MT initiated today, and patient with good tolerance, demonstrating approx. 10* increase in hip ROM t/o post tx.  HEP initiated as well, and patient with good tolerance for all without increased pain, although considerable weakness and fatigue noted.    Goals:  Pt. will be independent with home exercise program in : 6 weeks  Pt. will have improved quality of sleep: waking less times/night;in 6 weeks;Comment  Comment:: and able to lie on the R side without increased pain  Pt. will bend: to dress;with no pain;in 6 weeks  Patient will twist/turn : in bed;with no pain;for bed mobilitiy;in 6 weeks  Patient will sit: Comment  Comment: for dialysis without increased pain in 6 weeks.      Patient's expectations/goals are  "realistic.    Barriers to Learning or Achieving Goals:  Language barriers.       Plan / Patient Instructions:        Plan of Care:   Authorization / Certification Start Date: 03/12/20  Authorization / Certification End Date: 06/10/20  Authorization / Certification Number of Visits: 12  Communication with: Referral Source  Patient Related Instruction: Nature of Condition;Treatment plan and rationale;Self Care instruction;Basis of treatment;Body mechanics;Posture;Precautions;Next steps;Expected outcome  Times per Week: 1-2  Number of Weeks: 6-8  Number of Visits: 12  Therapeutic Exercise: ROM;Stretching;Strengthening  Neuromuscular Reeducation: balance/proprioception;core  Manual Therapy: joint mobilization;soft tissue mobilization      Plan for next visit: Review HEP, adding clamshells to HEP. Continue R hip mobilizations per below.      Subjective:       History of Present Illness:    Kevin Kelly) is a 74 y.o. female who presents to therapy today with complaints of right hip pain.   Pt with h/o L TKA in perhaps 2004. Feels these sx on the R side to be very similar to on her L side- and would like to prevent surgery as much as possible.  Onset: About 4-5 months ago. Denies specific injury or incident.  Duration: Intermittent  Worse with sitting (30 min max, but has to sit for 4 hour intervals for dialysis 3x/week), when trying to sleep especially when lying on that side at night, bending, turning over in bed  Better with change of position  Pain Medication: Denies pain medication use.  Sleep: Reports waking 2-3x/night due to pain.    Pt also notes significant R elbow and L hand pain; requesting to be seen for this as well.  No previous R hip surgery.    Pt seeks PT to \"rehabilitation.\"    Pain Rating:10     Objective:      Patient Outcome Measures :    None completed    PER EMR:  EXAM: XR PELVIS W 2 VW HIPS BILATERAL  LOCATION: [facility]  DATE/TIME: 3/5/2020 1:47 PM     INDICATION: Pain in " right hip  COMPARISON: None.     IMPRESSION:   Cemented left total hip arthroplasty is in anatomic alignment.   No fracture. Mild narrowing centrally in the right hip. Sacrum and SI joints are normal.     Examination  1. Acute right hip pain     2. Hip stiffness, right     3. Generalized muscle weakness     4. Unsteadiness on feet       Involved side: Right  Posture Observation:      General sitting posture is  fair.  General standing posture is fair.    Gait: WNL without obvious deviations. Relatively steady and without LOB. No AD used.    Lumbar ROM: All % of WNL  Date:      *Indicate scale AROM AROM AROM   Lumbar Flexion 90     Lumbar Extension 50      Right Left Right Left Right Left   Lumbar Sidebending 100 100       Lumbar Rotation 100 100       Thoracic Flexion      Thoracic Extension      Thoracic Sidebending         Thoracic Rotation           SLR: L 45*, R 40*.  Hip ROM  Date:      Hip ROM( ) AROM in degrees AROM in degrees AROM in degrees    Right Left Right Left Right Left   Hip Flexion (0-120 )         Hip Abduction (0-45 )         Hip External Rotation (0-50 )         Hip Internal Rotation (0-40 )         Hip Extension (0-15 )          PROM in degrees PROM in degrees PROM in degrees    Right Left Right Left Right Left   Hip Flexion (0-120 ) 110 with mild        Hip Abduction (0-45 ) 35 35       Hip External Rotation (0-50 ) 40 with mild ERP 45       Hip Internal Rotation (0-40 ) 30 with mild ERP 15       Hip Extension (0-15 )           (+) ANGELA for lack of ROM and pain bilat, but R more so than L.      Treatment Today     TREATMENT MINUTES COMMENTS   Evaluation 25    Self-care/ Home management     Manual therapy 10 Supine R hip long axis distraction, posterior/inferior glide with legs over bolster, and posterior glide with legs crossed, all grade II to decrease pain   Neuromuscular Re-education     Therapeutic Activity     Therapeutic Exercises 25 Exercises:  Exercise #1: Bridges - H  Comment  #1: 10x5 sec  Exercise #2: SL hip ABD - H  Comment #2: 10x B  Exercise #3: SKTC, butterfly stretches - H  Comment #3: 2x30 sec each  Exercise #4: Clamshells - add next visit       Gait training     Modality__________________                Total 60    Blank areas are intentional and mean the treatment did not include these items.            PT Evaluation Code: (Please list factors)  Patient History/Comorbidities: language barriers  Examination: R hip pain  Clinical Presentation: Stable  Clinical Decision Making: Low    Patient History/  Comorbidities Examination  (body structures and functions, activity limitations, and/or participation restrictions) Clinical Presentation Clinical Decision Making (Complexity)   No documented Comorbidities or personal factors 1-2 Elements Stable and/or uncomplicated Low   1-2 documented comorbidities or personal factor 3 Elements Evolving clinical presentation with changing characteristics Moderate   3-4 documented comorbidities or personal factors 4 or more Unstable and unpredictable High           Joe Mullins  3/12/2020  8:56 AM

## 2021-06-28 NOTE — PROGRESS NOTES
Progress Notes by Nick Hinson MD at 2/27/2020  4:10 PM     Author: Nick Hinson MD Service: -- Author Type: Physician    Filed: 2/27/2020  5:00 PM Encounter Date: 2/27/2020 Status: Signed    : Nick Hinson MD (Physician)           Thank you, Dr. Maurice, for advising Kevin HOOVER Macy Ellis to meet with me in consultation today at the [facility] to evaluate palpitations.     Assessment:    1. Palpitations  KRISTIAN Monitor Hook-Up   2. Heart murmur  Echo Complete       Discussion:    Trinidad has had occasional palpitations occurring perhaps once a week for the last couple of months.  She does not describe lightheadedness or syncope.  She has chronic chest pain lasting for a few seconds at a time.  Her last nuclear stress test in 2014 was completely normal.  A nurse in 2019 erroneously put in her chart that she had coronary artery disease and a prior myocardial infarction in 2014.  This was apparently based on Trinidad's belief that she had had this problem.  With Trinidad's permission, I reviewed the consultation performed at [facility] in 2014 by Dr. Jame Campa, a board-certified cardiologist.  Dr. Campa did not describe myocardial infarction or coronary artery disease and actually recommended the stress test that was carried out later that year at [facility] and which is copied below.  In addition, Dr. Campa noted that Trinidad had a coronary calcium score of 0 in 2008.  Thus, I removed the erroneous references to coronary artery disease and myocardial infarction in the medical tab of her electronic health record at this institution.    Plan:    1. 2-week patient activated monitor to investigate her palpitations.  2. Echocardiogram to investigate her murmur.  3. I do not recommend repeat stress testing for chest pain that last for only a few seconds.    We will call the  service with the results of her testing and provide further advice at that  time.    An After Visit Summary was printed and given to the patient.    Current History:    I met with Trinidad in consultation today and was assisted by professional .  She is originally from [facility].  She speaks some English but most of the conversation was performed with the help of the .    She states she has had episodes of her heart racing or skipping for perhaps 1 or 2 minutes at a time over the last 1 to 2 months.  The frequency is perhaps once a week.  There are no clear-cut precipitating or relieving factors.  She does not develop presyncope or syncope.    She also describes a long history of brief sharp chest pains that last for just a few seconds.    Her son states that sometimes her blood pressure is low at dialysis and that dialysis has to be interrupted.  He states his mother does not enjoy dialysis and would like to stop it.  He asked if that would and I counseled him that it would be fatal due to uremic poisoning.    Trinidad and her son do not know the name of her nephrologist so I cannot copy them with my report today.    Patient Active Problem List   Diagnosis   ? Seropositive rheumatoid arthritis of multiple sites (H)   ? High risk medication use   ? Anemia of chronic renal failure   ? Cyclic citrullinated peptide (CCP) antibody positive   ? ESRD (end stage renal disease) on dialysis (H)   ? Secondary renal hyperparathyroidism (H)   ? Ovarian cyst follow-up May 2020       Past Medical History:  Past Medical History:   Diagnosis Date   ? Agatston CAC score of 0 (zero) 2008    at [facility]   ? Anemia of chronic renal failure    ? Celiac disease    ? Compression fracture of body of thoracic vertebra (H)    ? Cyclic citrullinated peptide (CCP) antibody positive    ? Dialysis patient (H)    ? ESRD (end stage renal disease) (H)    ? Hypertension    ? Inverted nipple    ? Osteoporosis     severe   ? Ovarian cyst    ? Secondary renal hyperparathyroidism (H)    ?  "Seropositive rheumatoid arthritis (H)        Past Surgical History:  Past Surgical History:   Procedure Laterality Date   ? AV FISTULA PLACEMENT Left 2019    Procedure: LEFT ARM  ARTERIOVENOUS GRAFT PLACEMENT;  Surgeon: Sheri Hooper MD;  Location: [facility] OR;  Service: General   ? AV FISTULA PLACEMENT Left 2019    Procedure: LEFT ARM BASILIC FISTULA STAGE 1;  Surgeon: Sheri Hooper MD;  Location: [facility] OR;  Service: General   ? AV FISTULA PLACEMENT Left 2019    Procedure: Transposition of basilic vein fistula - Left;  Surgeon: Sheri Hooper MD;  Location: [facility] OR;  Service: General   ? IR TUNNELED CATHETER INSERT  2/15/2019   ? TOTAL HIP ARTHROPLASTY Left     per new patient questionaire   ? UPPER GASTROINTESTINAL ENDOSCOPY  2019   ? US BREAST CYST ASPIRATION RIGHT Right 5/3/2019       Family History:  Family History   Problem Relation Age of Onset   ? Sudden death Mother 75         in her sleep at home in [facility], autopsy said \"heart attack\"   ? No Medical Problems Father    ? No Medical Problems Brother    ? No Medical Problems Sister    ? No Medical Problems Brother    ? No Medical Problems Son    ? No Medical Problems Daughter    ? No Medical Problems Daughter    ? No Medical Problems Daughter    ? No Medical Problems Daughter    ? No Medical Problems Son    ? No Medical Problems Son    ? No Medical Problems Son        Social History:   reports that she has never smoked. She has never used smokeless tobacco. She reports that she does not drink alcohol or use drugs.    Medications:  Outpatient Encounter Medications as of 2020   Medication Sig Dispense Refill   ? acetaminophen (TYLENOL) 500 MG tablet 1-2 tablets every 4-6 hours as needed for pain. No more than 8 per 24 hour period.     ? diclofenac sodium (VOLTAREN) 1 % Gel Apply topically 4 (four) times a day.     ? lidocaine-prilocaine (EMLA) cream APPLY SMALL AMOUNT TO ACCESS SITE (AVF) 1 TO 2 " "HOURS BEFORE DIALYSIS. COVER WITH OCCLUSIVE DRESSING (SARAN WRAP)     ? loperamide HCl (ANTI-DIARRHEA ORAL) Take by mouth.     ? sevelamer HCL (RENAGEL) 800 MG tablet TAKE 1 TABLET BY MOUTH THREE TIMES A DAY WITH MEALS AND 1 TABLET TWICE A DAY WITH SNACKS     ? famotidine (PEPCID) 20 MG tablet Take 1 tablet (20 mg total) by mouth 2 (two) times a day. 180 tablet 3   ? Lactobacillus acidophilus (PROBIOTIC ORAL) Take by mouth.       No facility-administered encounter medications on file as of 2/27/2020.        Allergies:  Ibuprofen    Review of Systems:     General: Weight Loss  Eyes: Visual Distubance  Ears/Nose/Throat: WNL  Lungs: Shortness of Breath  Heart: Arm Pain, Shortness of Breath with activity(palpitpations)  Stomach: Diarrhea  Bladder: WNL  Muscle/Joints: Joint Pain, Muscle Pain  Skin: WNL  Nervous System: Dizziness  Mental Health: WNL     Blood: WNL    Objective:     Physical Exam:  Wt Readings from Last 5 Encounters:   02/27/20 136 lb 3.2 oz (61.8 kg)   02/04/20 138 lb (62.6 kg)   12/17/19 136 lb (61.7 kg)   12/03/19 134 lb 4.8 oz (60.9 kg)   09/17/19 134 lb (60.8 kg)      5' 2\" (1.575 m)  Body mass index is 24.91 kg/m .  /60 (Patient Site: Right Arm, Patient Position: Sitting, Cuff Size: Adult Regular)   Pulse 80   Resp 16   Ht 5' 2\" (1.575 m)   Wt 136 lb 3.2 oz (61.8 kg)   LMP 05/05/1985   BMI 24.91 kg/m      General Appearance: Alert and not in distress   HEENT: No scleral icterus; the mucous membranes are pink and moist   Neck: No cervical bruits, adenopathy, or thyromegaly; jugular venous pressure is less than 5 cm   Chest: The spine is straight and the chest is symmetric   Lungs: Respirations are unlabored; the lungs are clear to auscultation   Cardiovasular: Auscultation reveals normal first and second heart sounds with a grade 2/6 systolic ejection murmur; the carotid, radial, and posterior tibial pulses are intact   Abdomen: No organomegaly, masses, bruits, or tenderness; bowel sounds " are present   Extremities: No cyanosis, clubbing or edema   Skin: No xanthelasma   Neurologic: Mood and affect are appropriate       Cardiac testing:    EKG: Sinus rhythm, normal EKG per my personal review.    Results for orders placed during the hospital encounter of 12/19/14   NM Exercise Stress Test [EDK839] 12/19/2014    Narrative PROCEDURE INDICATION:  Chest pain.    PRIMARY CARE AND ORDERING PROVIDER:  Celso Neri MD    STRESS SUMMARY:  The patient exercised according to the Vineet protocol for   4 minutes  and 51 seconds, stopping due to fatigue.  The peak heart rate was 141   beats per  minute, which is 93% of the age predicted maximum.  The blood pressure   rosaline from  160/94 to 168/80, giving a double product of 23,688.  The stress   electrocardiogram  was read as negative for inducible ischemic changes by the supervising   cardiologist,  Dr. Tapia.    TECHNICAL COMMENTS:  4.0 mCi of thallium-201 were injected at rest and   31.5 mCi of  technetium sestamibi were injected at the peak of exercise.  The quality   control  images reveal a dense breast shadow.    FINDINGS:  The exercise stress and rest images demonstrate normal left   ventricular  size and tracer uptake pattern.  The gated images reveal normal regional   wall motion  with an ejection fraction of greater than 70%.    CONCLUSIONS:  1.  This exercise nuclear stress study is negative for inducible ischemia   or  infarction.  2.  The stress electrocardiogram is negative for ischemic changes.  3.  The ejection fraction is normal.  4.  The exercise tolerance is moderately depressed.      ROXANA RODRIGUEZ MD  ba  D 12/19/2014 12:40:00  T 12/19/2014 15:13:05  R 12/19/2014 15:13:05  [id number]      cc: CELSO NERI MD          Imaging:    No results found.    Lab Review:    Lab Results   Component Value Date     09/10/2019    K 4.5 09/10/2019     09/10/2019    CO2 23 09/10/2019    BUN 49 (H) 09/10/2019    CREATININE 8.09 (HH) 11/29/2019     CALCIUM 8.5 09/10/2019     Lab Results   Component Value Date    WBC 7.6 11/29/2019    HGB 12.1 11/29/2019    HCT 37.0 11/29/2019    MCV 95 11/29/2019     11/29/2019     Creatinine (mg/dL)   Date Value   11/29/2019 8.09 (HH)   09/10/2019 7.11 ()   09/09/2019 6.57 ()   07/30/2019 9.14 ()           Much or all of the text in this note was generated through the use of the Dragon Dictate voice-to-text software. Errors in spelling or words which seem out of context are unintentional. Sound alike errors, in particular, may have escaped editing.

## 2021-07-03 NOTE — ADDENDUM NOTE
Addendum Note by Alexander Coburn MD at 7/8/2019  9:35 AM     Author: Alexander Coburn MD Service: -- Author Type: Physician    Filed: 7/11/2019  4:39 PM Encounter Date: 7/8/2019 Status: Signed    : Alexander Coburn MD (Physician)    Addended by: ALEXANDER COBURN on: 7/11/2019 04:39 PM        Modules accepted: Orders

## 2021-07-03 NOTE — ADDENDUM NOTE
Addendum Note by Toby Summers MLT at 8/2/2019  2:40 PM     Author: Toby Summers MLT Service: -- Author Type:     Filed: 8/5/2019  7:01 AM Encounter Date: 8/2/2019 Status: Signed    : Toby Summers MLT ()    Addended by: TOBY SUMMERS on: 8/5/2019 07:01 AM        Modules accepted: Orders

## 2021-07-03 NOTE — ANESTHESIA PREPROCEDURE EVALUATION
Anesthesia Preprocedure Evaluation by Kimani Mendez MD at 2/21/2019  7:13 AM     Author: Kimani Mendez MD Service: -- Author Type: Physician    Filed: 2/21/2019  7:14 AM Date of Service: 2/21/2019  7:13 AM Status: Signed    : Kimani Mendez MD (Physician)       Anesthesia Evaluation      Patient summary reviewed   No history of anesthetic complications     Airway   Mallampati: III  Neck ROM: full   Pulmonary - negative ROS and normal exam                          Cardiovascular - normal exam  (+) hypertension, ,     ECG reviewed        Neuro/Psych      Endo/Other    (+) arthritis (RA),      GI/Hepatic/Renal    (+)   chronic renal disease (unclear etiology of kidney failure) ESRD and dialysis, last dialysis date: 2/20/2019,      Other findings: anemia      Dental    (+) upper dentures                           Anesthesia Plan  Planned anesthetic: peripheral nerve block  Ax block   ASA 3     Anesthetic plan and risks discussed with: patient and  services used  Anesthesia plan special considerations: antiemetics,   Post-op plan: routine recovery  DNR/DNI status   DNR/DNI status discussed with patient.  Plan is for suspension of DNR      Chemistry        Component Value Date/Time     (L) 02/20/2019 0231    K 3.5 02/20/2019 0231    CL 96 (L) 02/20/2019 0231    CO2 23 02/20/2019 0231    BUN 22 02/20/2019 0231    CREATININE 5.21 (H) 02/20/2019 0231    GLU 97 02/20/2019 0231        Component Value Date/Time    CALCIUM 8.0 (L) 02/20/2019 0231    ALKPHOS 53 02/20/2019 0231    AST 16 02/20/2019 0231    ALT <9 02/20/2019 0231    BILITOT 0.3 02/20/2019 0231        Lab Results   Component Value Date    WBC 12.0 (H) 02/20/2019    HGB 8.3 (L) 02/20/2019    HCT 25.8 (L) 02/20/2019    MCV 97 02/20/2019     02/20/2019     Lab Results   Component Value Date    INR 1.24 (H) 02/18/2019    INR 1.22 (H) 02/15/2019

## 2021-07-03 NOTE — ANESTHESIA PREPROCEDURE EVALUATION
Anesthesia Preprocedure Evaluation by Kamari Devlin MD at 4/5/2019  7:22 AM     Author: Kamari Devlin MD Service: -- Author Type: Physician    Filed: 4/5/2019 12:40 PM Date of Service: 4/5/2019  7:22 AM Status: Addendum    : Kamari Devlin MD (Physician)    Related Notes: Original Note by Kamari Devlin MD (Physician) filed at 4/5/2019 12:30 PM       Anesthesia Evaluation      Patient summary reviewed   No history of anesthetic complications     Airway   Mallampati: II  Neck ROM: full   Pulmonary - negative ROS and normal exam   (-) shortness of breath                         Cardiovascular - normal exam  Exercise tolerance: > or = 4 METS  (+) hypertension, ,     (-) CAD   Neuro/Psych      Endo/Other    (+) arthritis,      GI/Hepatic/Renal    (+)   chronic renal disease dialysis, last dialysis date: 4/3/2019,      Other findings: Negative NMST 12/19/14.  Had dialysis graft done 2/21/19 under axillary block.  RA.  On prednisone and humira.  Cancelled 4/2/19 owing to K of 5.9.  BMP today just drawn. Upper partial.       Dental    (+) upper dentures              Anesthesia Evaluation      Patient summary reviewed   No history of anesthetic complications     Airway   Mallampati: III  Neck ROM: full   Pulmonary - negative ROS and normal exam                          Cardiovascular - normal exam  (+) hypertension, ,     ECG reviewed        Neuro/Psych      Endo/Other    (+) arthritis (RA),      GI/Hepatic/Renal    (+)   chronic renal disease (unclear etiology of kidney failure) ESRD and dialysis, last dialysis date: 2/20/2019,      Other findings: anemia      Dental    (+) upper dentures                           Anesthesia Plan  Planned anesthetic: peripheral nerve block  Ax block   ASA 3     Anesthetic plan and risks discussed with: patient and  services used  Anesthesia plan special considerations: antiemetics,   Post-op plan: routine recovery  DNR/DNI status   DNR/DNI status  discussed with patient.  Plan is for suspension of DNR      Chemistry        Component Value Date/Time     04/02/2019 1257    K 6.0 (H) 04/02/2019 1408     (H) 04/02/2019 1257    CO2 15 (L) 04/02/2019 1257    BUN 68 (H) 04/02/2019 1257    CREATININE 7.11 (HH) 04/02/2019 1257    GLU 67 (L) 04/02/2019 1257        Component Value Date/Time    CALCIUM 8.6 04/02/2019 1257    ALKPHOS 53 02/20/2019 0231    AST 16 02/20/2019 0231    ALT <9 02/20/2019 0231    BILITOT 0.3 02/20/2019 0231        Lab Results   Component Value Date    WBC 10.1 04/02/2019    HGB 10.2 (L) 04/02/2019    HCT 35.2 04/02/2019     (H) 04/02/2019     04/02/2019     Lab Results   Component Value Date    INR 1.24 (H) 02/18/2019    INR 1.22 (H) 02/15/2019                Anesthesia Plan  Planned anesthetic: peripheral nerve block  Supraclavicular block discussed and pt consented for same.  ASA 3     Anesthetic plan and risks discussed with: patient    Post-op plan: routine recovery

## 2021-07-03 NOTE — ADDENDUM NOTE
Addendum Note by Bucky Coronel at 7/17/2018 10:21 AM     Author: Bucky Coronel Service: -- Author Type:     Filed: 7/17/2018 10:21 AM Encounter Date: 7/16/2018 Status: Signed    : Bucky Coronel ()    Addended by: BUCKY CORONEL on: 7/17/2018 10:21 AM        Modules accepted: Orders

## 2021-07-04 NOTE — ADDENDUM NOTE
Addendum Note by Giovany Peña LPN at 1/21/2021  1:13 PM     Author: Giovany Peña LPN Service: -- Author Type: Licensed Nurse    Filed: 2/5/2021 11:53 AM Encounter Date: 1/21/2021 Status: Signed    : Giovany Peña LPN (Licensed Nurse)    Addended by: ROBERT PEÑA on: 2/5/2021 11:53 AM        Modules accepted: Orders

## 2021-07-14 PROBLEM — N18.6 ESRD (END STAGE RENAL DISEASE) (H): Status: RESOLVED | Noted: 2019-03-20 | Resolved: 2019-05-10

## 2021-07-14 PROBLEM — N18.30 CRF (CHRONIC RENAL FAILURE), STAGE 3 (MODERATE) (H): Status: RESOLVED | Noted: 2017-11-09 | Resolved: 2019-03-28

## 2021-07-14 PROBLEM — Z99.2 HEMODIALYSIS PATIENT (H): Chronic | Status: RESOLVED | Noted: 2019-09-09 | Resolved: 2019-12-17

## 2021-07-14 PROBLEM — N18.6 ESRD (END STAGE RENAL DISEASE) (H): Chronic | Status: RESOLVED | Noted: 2019-09-09 | Resolved: 2019-09-17

## 2021-08-03 PROBLEM — I77.0 A-V FISTULA (H): Chronic | Status: RESOLVED | Noted: 2019-09-09 | Resolved: 2019-12-17

## 2021-08-03 PROBLEM — S22.000A COMPRESSION FRACTURE OF BODY OF THORACIC VERTEBRA (H): Status: RESOLVED | Noted: 2019-02-25 | Resolved: 2020-02-27

## 2021-08-03 PROBLEM — K65.9 PERITONITIS (H): Status: RESOLVED | Noted: 2021-01-01 | Resolved: 2021-01-01

## 2021-08-03 PROBLEM — E46 UNSPECIFIED PROTEIN-CALORIE MALNUTRITION (H): Status: RESOLVED | Noted: 2019-08-22 | Resolved: 2021-01-01

## 2021-08-03 PROBLEM — M48.50XA: Status: RESOLVED | Noted: 2019-02-25 | Resolved: 2019-03-28

## 2021-08-03 PROBLEM — D68.9 COAGULATION DEFECT, UNSPECIFIED (H): Status: RESOLVED | Noted: 2021-01-01 | Resolved: 2021-01-01
